# Patient Record
Sex: FEMALE | Race: WHITE | NOT HISPANIC OR LATINO | Employment: UNEMPLOYED | URBAN - METROPOLITAN AREA
[De-identification: names, ages, dates, MRNs, and addresses within clinical notes are randomized per-mention and may not be internally consistent; named-entity substitution may affect disease eponyms.]

---

## 2017-04-13 ENCOUNTER — HOSPITAL ENCOUNTER (OUTPATIENT)
Dept: RADIOLOGY | Facility: MEDICAL CENTER | Age: 37
Discharge: HOME | End: 2017-04-13
Payer: COMMERCIAL

## 2017-04-13 ENCOUNTER — ALLSCRIPTS OFFICE VISIT (OUTPATIENT)
Dept: OTHER | Facility: OTHER | Age: 37
End: 2017-04-13

## 2017-04-13 DIAGNOSIS — M79.644 PAIN OF FINGER OF RIGHT HAND: ICD-10-CM

## 2017-04-13 DIAGNOSIS — I34.1 NONRHEUMATIC MITRAL VALVE PROLAPSE: ICD-10-CM

## 2017-04-13 DIAGNOSIS — M79.645 PAIN OF FINGER OF LEFT HAND: ICD-10-CM

## 2017-04-13 PROCEDURE — 73110 X-RAY EXAM OF WRIST: CPT

## 2017-04-13 PROCEDURE — 73140 X-RAY EXAM OF FINGER(S): CPT

## 2017-04-17 ENCOUNTER — ALLSCRIPTS OFFICE VISIT (OUTPATIENT)
Dept: OTHER | Facility: OTHER | Age: 37
End: 2017-04-17

## 2017-04-17 ENCOUNTER — TRANSCRIBE ORDERS (OUTPATIENT)
Dept: ADMINISTRATIVE | Facility: HOSPITAL | Age: 37
End: 2017-04-17

## 2017-04-17 DIAGNOSIS — I34.1 MITRAL VALVE PROLAPSE: Primary | ICD-10-CM

## 2017-04-21 ENCOUNTER — HOSPITAL ENCOUNTER (OUTPATIENT)
Dept: NON INVASIVE DIAGNOSTICS | Facility: CLINIC | Age: 37
Discharge: HOME | End: 2017-04-21
Payer: COMMERCIAL

## 2017-04-21 DIAGNOSIS — I34.1 NONRHEUMATIC MITRAL VALVE PROLAPSE: ICD-10-CM

## 2017-04-21 PROCEDURE — 93225 XTRNL ECG REC<48 HRS REC: CPT

## 2017-04-21 PROCEDURE — 93226 XTRNL ECG REC<48 HR SCAN A/R: CPT

## 2017-06-15 ENCOUNTER — HOSPITAL ENCOUNTER (OUTPATIENT)
Dept: NON INVASIVE DIAGNOSTICS | Facility: MEDICAL CENTER | Age: 37
Discharge: HOME | End: 2017-06-15
Payer: COMMERCIAL

## 2017-06-15 DIAGNOSIS — I34.1 MITRAL VALVE PROLAPSE: ICD-10-CM

## 2017-06-15 PROCEDURE — 93306 TTE W/DOPPLER COMPLETE: CPT

## 2017-06-22 ENCOUNTER — ALLSCRIPTS OFFICE VISIT (OUTPATIENT)
Dept: OTHER | Facility: OTHER | Age: 37
End: 2017-06-22

## 2017-07-18 ENCOUNTER — ALLSCRIPTS OFFICE VISIT (OUTPATIENT)
Dept: OTHER | Facility: OTHER | Age: 37
End: 2017-07-18

## 2018-01-14 VITALS
WEIGHT: 198 LBS | HEIGHT: 65 IN | DIASTOLIC BLOOD PRESSURE: 79 MMHG | SYSTOLIC BLOOD PRESSURE: 149 MMHG | BODY MASS INDEX: 32.99 KG/M2 | HEART RATE: 73 BPM

## 2018-01-14 VITALS
HEIGHT: 65 IN | BODY MASS INDEX: 33.82 KG/M2 | DIASTOLIC BLOOD PRESSURE: 82 MMHG | SYSTOLIC BLOOD PRESSURE: 127 MMHG | WEIGHT: 203 LBS | HEART RATE: 61 BPM

## 2018-01-14 VITALS
WEIGHT: 202 LBS | HEIGHT: 65 IN | SYSTOLIC BLOOD PRESSURE: 114 MMHG | HEART RATE: 77 BPM | DIASTOLIC BLOOD PRESSURE: 74 MMHG | BODY MASS INDEX: 33.66 KG/M2

## 2018-01-14 VITALS
BODY MASS INDEX: 33.99 KG/M2 | SYSTOLIC BLOOD PRESSURE: 122 MMHG | WEIGHT: 204 LBS | DIASTOLIC BLOOD PRESSURE: 84 MMHG | HEART RATE: 60 BPM | HEIGHT: 65 IN

## 2019-07-17 ENCOUNTER — OFFICE VISIT (OUTPATIENT)
Dept: URGENT CARE | Facility: MEDICAL CENTER | Age: 39
End: 2019-07-17
Payer: COMMERCIAL

## 2019-07-17 VITALS — OXYGEN SATURATION: 99 % | RESPIRATION RATE: 18 BRPM | HEIGHT: 65 IN | BODY MASS INDEX: 38.25 KG/M2 | WEIGHT: 229.6 LBS

## 2019-07-17 DIAGNOSIS — S61.212A LACERATION OF RIGHT MIDDLE FINGER WITHOUT FOREIGN BODY WITHOUT DAMAGE TO NAIL, INITIAL ENCOUNTER: Primary | ICD-10-CM

## 2019-07-17 PROCEDURE — 12001 RPR S/N/AX/GEN/TRNK 2.5CM/<: CPT | Performed by: PHYSICIAN ASSISTANT

## 2019-07-17 PROCEDURE — 99213 OFFICE O/P EST LOW 20 MIN: CPT | Performed by: PHYSICIAN ASSISTANT

## 2019-07-17 RX ORDER — LEVOTHYROXINE SODIUM 0.03 MG/1
25 TABLET ORAL DAILY
COMMUNITY
End: 2020-10-01 | Stop reason: SDUPTHER

## 2019-07-17 RX ORDER — ALPRAZOLAM 0.5 MG/1
0.5 TABLET ORAL AS NEEDED
COMMUNITY
Start: 2017-05-22

## 2019-07-18 NOTE — PROGRESS NOTES
[facility]        NAME: Faby Caballero is a 45 y o  female  : 1980    MRN: [id number]  DATE: 2019  TIME: 10:25 PM    Assessment and Plan   Laceration of right middle finger without foreign body without damage to nail, initial encounter [S61 212A]  1  Laceration of right middle finger without foreign body without damage to nail, initial encounter           Patient Instructions     Keep area clean and dry  Do not apply antibiotic ointment over glue  Return for any signs of infection  Follow up with PCP in 3-5 days  Proceed to  ER if symptoms worsen  Chief Complaint     Chief Complaint   Patient presents with    Finger Laceration     Laceration to tip of third digit on pt's right hand  History of Present Illness       Patient is a 31-year-old female presenting for a laceration to the tip of her right middle finger and our goal   She reports she caught it on a broken metal broom  She has active bleeding that she was trying to stop  No swelling, bruising, numbness, tingling      Review of Systems   Review of Systems   Constitutional: Negative for chills and fever  Skin: Positive for wound  Neurological: Negative for numbness  Current Medications       Current Outpatient Medications:     ALPRAZolam (XANAX) 0 5 mg tablet, as needed, Disp: , Rfl:     levothyroxine 25 mcg tablet, Take 25 mcg by mouth daily, Disp: , Rfl:     Multiple Vitamins-Minerals (MULTIVITAMINS PLUS ZINC) CAPS, Take by mouth, Disp: , Rfl:     Current Allergies     Allergies as of 2019 - Reviewed 2019   Allergen Reaction Noted    Nickel Hives, Itching, and Rash 2015            The following portions of the patient's history were reviewed and updated as appropriate: allergies, current medications, past family history, past medical history, past social history, past surgical history and problem list      History reviewed  No pertinent past medical history  History reviewed   No pertinent surgical history  Family History   Problem Relation Age of Onset    Hypothyroidism Mother     Hypothyroidism Father     Heart disease Father          Medications have been verified  Objective   Resp 18   Ht 5' 5" (1 651 m)   Wt 104 kg (229 lb 9 6 oz)   LMP 06/20/2019 (Exact Date)   SpO2 99%   BMI 38 21 kg/m²          Physical Exam     Physical Exam   Constitutional: She is oriented to person, place, and time  She appears well-developed and well-nourished  No distress  HENT:   Head: Normocephalic and atraumatic  Eyes: Conjunctivae are normal    Pulmonary/Chest: Effort normal    Neurological: She is alert and oriented to person, place, and time  Skin: Skin is warm and dry  She is not diaphoretic    1cm clean well approximated laceration to finger pad of right middle finger  Bleeding was able to be stopped  Small amount of subcutaneous tissue seen  No swelling, bruising, numbness  Cap refill intact  Psychiatric: She has a normal mood and affect  Her behavior is normal      Laceration repair  Date/Time: 7/17/2019 10:27 PM  Performed by: hCeikh Bacon PA-C  Authorized by: Cheikh Bacon PA-C   Consent: Verbal consent obtained    Consent given by: patient  Body area: upper extremity  Location details: right long finger  Laceration length: 1 cm      Procedure Details:  Skin closure: glue  Patient tolerance: Patient tolerated the procedure well with no immediate complications

## 2019-09-18 ENCOUNTER — TELEPHONE (OUTPATIENT)
Dept: HEMATOLOGY ONCOLOGY | Facility: CLINIC | Age: 39
End: 2019-09-18

## 2019-12-03 ENCOUNTER — CONSULT (OUTPATIENT)
Dept: HEMATOLOGY ONCOLOGY | Facility: CLINIC | Age: 39
End: 2019-12-03
Payer: COMMERCIAL

## 2019-12-03 VITALS
TEMPERATURE: 97 F | OXYGEN SATURATION: 97 % | HEIGHT: 64 IN | RESPIRATION RATE: 16 BRPM | SYSTOLIC BLOOD PRESSURE: 140 MMHG | WEIGHT: 232 LBS | DIASTOLIC BLOOD PRESSURE: 92 MMHG | BODY MASS INDEX: 39.61 KG/M2 | HEART RATE: 104 BPM

## 2019-12-03 DIAGNOSIS — D68.52 PROTHROMBIN GENE MUTATION (HCC): Primary | ICD-10-CM

## 2019-12-03 PROCEDURE — 99245 OFF/OP CONSLTJ NEW/EST HI 55: CPT | Performed by: INTERNAL MEDICINE

## 2019-12-03 NOTE — PROGRESS NOTES
Oncology Outpatient Consult Note  Swetha Garner 44 y o  female MRN: @ Encounter: [id number]        Date:  12/3/2019        CC:  Heterozygote for prothrombin gene mutation      HPI:  Swetha Garner is seen for initial consultation 12/3/2019 regarding Heterozygosity for prothrombin gene mutation  The patient's mother has a history of blood clots so she was tested and her thrombosis panel was positive for her being a heterozygote for prothrombin gene mutation  The patient has had a pregnancy and healthy child with no issues  Has had multiple surgeries including a cardiac ablation and recent gallbladder surgery again with no issues  Denies any personal history of blood clots or thrombophlebitis  Denies any nausea denies any vomiting denies any diarrhea  Otherwise is at baseline  The rest of her 14 point review of systems today was negative  ROS: As stated in history of present illness otherwise her 14 point review of systems today was negative      Active Problems: Hypothyroidism, anxiety    Past Medical History: As above    Surgical History: cholecystectomy and cardiac ablation along with LEEP procedure and wisdom tooth removal    Family History:    Family History   Problem Relation Age of Onset   Junius Nicole Hypothyroidism Mother     Hypothyroidism Father     Heart disease Father          Social History:   Social History     Socioeconomic History    Marital status: Single     Spouse name: Not on file    Number of children: Not on file    Years of education: Not on file    Highest education level: Not on file   Occupational History    Not on file   Social Needs    Financial resource strain: Not on file    Food insecurity:     Worry: Not on file     Inability: Not on file    Transportation needs:     Medical: Not on file     Non-medical: Not on file   Tobacco Use    Smoking status: Never Smoker    Smokeless tobacco: Never Used   Substance and Sexual Activity    Alcohol use: Not on file    Drug use: Not on file  Sexual activity: Not on file   Lifestyle    Physical activity:     Days per week: Not on file     Minutes per session: Not on file    Stress: Not on file   Relationships    Social connections:     Talks on phone: Not on file     Gets together: Not on file     Attends Hindu service: Not on file     Active member of club or organization: Not on file     Attends meetings of clubs or organizations: Not on file     Relationship status: Not on file    Intimate partner violence:     Fear of current or ex partner: Not on file     Emotionally abused: Not on file     Physically abused: Not on file     Forced sexual activity: Not on file   Other Topics Concern    Not on file   Social History Narrative    Not on file       Current Medications:   Current Outpatient Medications   Medication Sig Dispense Refill    ALPRAZolam (XANAX) 0 5 mg tablet as needed      levothyroxine 25 mcg tablet Take 25 mcg by mouth daily      Multiple Vitamins-Minerals (MULTIVITAMINS PLUS ZINC) CAPS Take by mouth       No current facility-administered medications for this visit  Allergies: Allergies   Allergen Reactions    Nickel Hives, Itching and Rash         Physical Exam:    Body surface area is 2 08 meters squared  Wt Readings from Last 3 Encounters:   12/03/19 105 kg (232 lb)   07/17/19 104 kg (229 lb 9 6 oz)   07/18/17 91 6 kg (202 lb)        Temp Readings from Last 3 Encounters:   12/03/19 (!) 97 °F (36 1 °C) (Tympanic)        BP Readings from Last 3 Encounters:   12/03/19 140/92   07/18/17 114/74   06/22/17 127/82         Pulse Readings from Last 3 Encounters:   12/03/19 104   07/18/17 77   06/22/17 61    Physical Exam     Constitutional   General appearance: No acute distress, well appearing and well nourished  Eyes   Conjunctiva and lids: No swelling, erythema or discharge  Pupils and irises: Equal, round and reactive to light      Ears, Nose, Mouth, and Throat   External inspection of ears and nose: Normal  Nasal mucosa, septum, and turbinates: Normal without edema or erythema  Oropharynx: Normal with no erythema, edema, exudate or lesions  Pulmonary   Respiratory effort: No increased work of breathing or signs of respiratory distress  Auscultation of lungs: Clear to auscultation  Cardiovascular   Palpation of heart: Normal PMI, no thrills  Auscultation of heart: Normal rate and rhythm, normal S1 and S2, without murmurs  Examination of extremities for edema and/or varicosities: Normal     Carotid pulses: Normal     Abdomen   Abdomen: Non-tender, no masses  Liver and spleen: No hepatomegaly or splenomegaly  Lymphatic   Palpation of lymph nodes in neck: No lymphadenopathy  Musculoskeletal   Gait and station: Normal     Digits and nails: Normal without clubbing or cyanosis  Inspection/palpation of joints, bones, and muscles: Normal     Skin   Skin and subcutaneous tissue: Normal without rashes or lesions  Neurologic   Cranial nerves: Cranial nerves 2-12 intact  Sensation: No sensory loss  Psychiatric   Orientation to person, place, and time: Normal     Mood and affect: Normal           Assessment/ Plan:    The patient is a pleasant 79-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 for discussion about being heterozygote for prothrombin gene mutation  Her mother also carries a mutation and has had blood clots  The patient herself has no personal history of blood clots despite having been stressed multiple times including a pregnancy and surgeries  At this point I see no reason to start her on anticoagulation  I have recommended she take a baby aspirin daily  The patient is in agreement with the plan  She asked appropriate questions which were answered  She is aware of the signs and symptoms of both blood clot in her legs and also in the lungs as she is in healthcare herself   Regardless we went over all the signs and symptoms again and I advised her against long plane rides her car rides or long periods of immobility and advise she should always walk around after an hour or 2 of inactivity  The patient is in agreement with this  I'll see her as needed  She will continue follow with her primary care physician  She'll stay up-to-date on her screening  Goals and Barriers:  Current Goal:  Prolong Survival From heterozygosity for prothrombin gene mutation  Barriers: None  Patient's Capacity to Self Care:  Patient  able to self care  Portions of the record may have been created with voice recognition software   Occasional wrong word or "sound a like" substitutions may have occurred due to the inherent limitations of voice recognition software   Read the chart carefully and recognize, using context, where substitutions have occurred

## 2019-12-04 ENCOUNTER — TELEPHONE (OUTPATIENT)
Dept: HEMATOLOGY ONCOLOGY | Facility: CLINIC | Age: 39
End: 2019-12-04

## 2019-12-04 NOTE — TELEPHONE ENCOUNTER
Patient called in requested a excuse letter as she was the office yesterday  Patient requested the letter to be fax to [phone] [facility]

## 2020-01-28 ENCOUNTER — HOSPITAL ENCOUNTER (EMERGENCY)
Facility: HOSPITAL | Age: 40
Discharge: HOME | End: 2020-01-28
Attending: EMERGENCY MEDICINE
Payer: COMMERCIAL

## 2020-01-28 VITALS
RESPIRATION RATE: 20 BRPM | TEMPERATURE: 98.7 F | OXYGEN SATURATION: 94 % | SYSTOLIC BLOOD PRESSURE: 162 MMHG | HEART RATE: 65 BPM | DIASTOLIC BLOOD PRESSURE: 74 MMHG | BODY MASS INDEX: 39.48 KG/M2 | WEIGHT: 230 LBS

## 2020-01-28 DIAGNOSIS — R55 NEAR SYNCOPE: Primary | ICD-10-CM

## 2020-01-28 LAB
ALBUMIN SERPL BCP-MCNC: 4.1 G/DL (ref 3.5–5)
ALP SERPL-CCNC: 135 U/L (ref 46–116)
ALT SERPL W P-5'-P-CCNC: 33 U/L (ref 12–78)
ANION GAP SERPL CALCULATED.3IONS-SCNC: 9 MMOL/L (ref 4–13)
AST SERPL W P-5'-P-CCNC: 13 U/L (ref 5–45)
BASOPHILS # BLD AUTO: 0.06 THOUSANDS/ΜL (ref 0–0.1)
BASOPHILS NFR BLD AUTO: 0 % (ref 0–1)
BILIRUB SERPL-MCNC: 0.53 MG/DL (ref 0.2–1)
BUN SERPL-MCNC: 11 MG/DL (ref 5–25)
CALCIUM SERPL-MCNC: 9 MG/DL (ref 8.3–10.1)
CHLORIDE SERPL-SCNC: 101 MMOL/L (ref 100–108)
CO2 SERPL-SCNC: 29 MMOL/L (ref 21–32)
CREAT SERPL-MCNC: 0.73 MG/DL (ref 0.6–1.3)
EOSINOPHIL # BLD AUTO: 0.08 THOUSAND/ΜL (ref 0–0.61)
EOSINOPHIL NFR BLD AUTO: 1 % (ref 0–6)
ERYTHROCYTE [DISTWIDTH] IN BLOOD BY AUTOMATED COUNT: 12 % (ref 11.6–15.1)
GFR SERPL CREATININE-BSD FRML MDRD: 104 ML/MIN/1.73SQ M
GLUCOSE SERPL-MCNC: 108 MG/DL (ref 65–140)
HCT VFR BLD AUTO: 44.7 % (ref 34.8–46.1)
HGB BLD-MCNC: 14.9 G/DL (ref 11.5–15.4)
IMM GRANULOCYTES # BLD AUTO: 0.05 THOUSAND/UL (ref 0–0.2)
IMM GRANULOCYTES NFR BLD AUTO: 0 % (ref 0–2)
LYMPHOCYTES # BLD AUTO: 2.95 THOUSANDS/ΜL (ref 0.6–4.47)
LYMPHOCYTES NFR BLD AUTO: 22 % (ref 14–44)
MCH RBC QN AUTO: 30.3 PG (ref 26.8–34.3)
MCHC RBC AUTO-ENTMCNC: 33.3 G/DL (ref 31.4–37.4)
MCV RBC AUTO: 91 FL (ref 82–98)
MONOCYTES # BLD AUTO: 0.8 THOUSAND/ΜL (ref 0.17–1.22)
MONOCYTES NFR BLD AUTO: 6 % (ref 4–12)
NEUTROPHILS # BLD AUTO: 9.69 THOUSANDS/ΜL (ref 1.85–7.62)
NEUTS SEG NFR BLD AUTO: 71 % (ref 43–75)
NRBC BLD AUTO-RTO: 0 /100 WBCS
PLATELET # BLD AUTO: 257 THOUSANDS/UL (ref 149–390)
PMV BLD AUTO: 9.7 FL (ref 8.9–12.7)
POTASSIUM SERPL-SCNC: 4.1 MMOL/L (ref 3.5–5.3)
PROT SERPL-MCNC: 8.4 G/DL (ref 6.4–8.2)
RBC # BLD AUTO: 4.92 MILLION/UL (ref 3.81–5.12)
SODIUM SERPL-SCNC: 139 MMOL/L (ref 136–145)
TROPONIN I SERPL-MCNC: <0.02 NG/ML
TSH SERPL DL<=0.05 MIU/L-ACNC: 3.21 UIU/ML (ref 0.36–3.74)
WBC # BLD AUTO: 13.63 THOUSAND/UL (ref 4.31–10.16)

## 2020-01-28 PROCEDURE — 36415 COLL VENOUS BLD VENIPUNCTURE: CPT | Performed by: EMERGENCY MEDICINE

## 2020-01-28 PROCEDURE — 80053 COMPREHEN METABOLIC PANEL: CPT | Performed by: EMERGENCY MEDICINE

## 2020-01-28 PROCEDURE — 84443 ASSAY THYROID STIM HORMONE: CPT | Performed by: EMERGENCY MEDICINE

## 2020-01-28 PROCEDURE — 84484 ASSAY OF TROPONIN QUANT: CPT | Performed by: EMERGENCY MEDICINE

## 2020-01-28 PROCEDURE — 93005 ELECTROCARDIOGRAM TRACING: CPT

## 2020-01-28 PROCEDURE — 99283 EMERGENCY DEPT VISIT LOW MDM: CPT | Performed by: EMERGENCY MEDICINE

## 2020-01-28 PROCEDURE — 85025 COMPLETE CBC W/AUTO DIFF WBC: CPT | Performed by: EMERGENCY MEDICINE

## 2020-01-28 PROCEDURE — 96360 HYDRATION IV INFUSION INIT: CPT

## 2020-01-28 PROCEDURE — 99284 EMERGENCY DEPT VISIT MOD MDM: CPT

## 2020-01-28 RX ADMIN — SODIUM CHLORIDE 1000 ML: 0.9 INJECTION, SOLUTION INTRAVENOUS at 17:58

## 2020-01-28 NOTE — ED PROVIDER NOTES
History  Chief Complaint   Patient presents with    Dizziness     per pt "she had some palpitation at work and felt like she was going to pass out and has been having some dizzy spells since, pt has a Hx  of SVT  "     44 y o  Female presents with chief complaint of an episode of light headedness and near syncope while at work  Patient works as a school nurse  She reports she was up and down all day (normal for her) and had eaten as much as she usually does  About 1 hour prior to presentation she got up and felt very light headed and felt like her vision was going dark  She had some palpitations as well  She stood still and the symptoms improved but she had a recurrence of the symptoms a few minutes later  She sat down and drank some water and felt better  She drove herself to the ED and had some lightheadedness en route  No syncope  No chest pain  No shortness of breath  History provided by:  Patient   used: No    Dizziness   Quality:  Lightheadedness  Severity:  Moderate  Onset quality:  Sudden  Duration:  1 hour  Timing:  Intermittent  Progression:  Waxing and waning  Chronicity:  New  Relieved by:  Being still  Worsened by:  Nothing  Ineffective treatments:  None tried  Associated symptoms: palpitations and vision changes    Associated symptoms: no chest pain, no diarrhea, no headaches, no hearing loss, no nausea, no shortness of breath, no syncope, no vomiting and no weakness    Risk factors: new medications (lexapro)        Prior to Admission Medications   Prescriptions Last Dose Informant Patient Reported? Taking?    ALPRAZolam (XANAX) 0 5 mg tablet  Self Yes No   Sig: as needed   Multiple Vitamins-Minerals (MULTIVITAMINS PLUS ZINC) CAPS  Self Yes No   Sig: Take by mouth   levothyroxine 25 mcg tablet  Self Yes No   Sig: Take 25 mcg by mouth daily      Facility-Administered Medications: None       Past Medical History:   Diagnosis Date    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     Disease of thyroid gland     [facility]     SVT (supraventricular tachycardia) (HCC)        Past Surgical History:   Procedure Laterality Date    CARDIAC ELECTROPHYSIOLOGY STUDY AND ABLATION      CHOLECYSTECTOMY         Family History   Problem Relation Age of Onset    Hypothyroidism Mother     Hypothyroidism Father     Heart disease Father      I have reviewed and agree with the history as documented  Social History     Tobacco Use    Smoking status: Former Smoker     Years: 5 00    Smokeless tobacco: Never Used   Substance Use Topics    Alcohol use: Yes     Frequency: Never     Comment: rarely    Drug use: Never        Review of Systems   Constitutional: Negative for chills, diaphoresis and fever  HENT: Negative for hearing loss  Respiratory: Negative for shortness of breath  Cardiovascular: Positive for palpitations  Negative for chest pain and syncope  Gastrointestinal: Negative for diarrhea, nausea and vomiting  Genitourinary: Negative for dysuria and frequency  Skin: Negative for rash  Neurological: Positive for dizziness  Negative for weakness and headaches  All other systems reviewed and are negative  Physical Exam  Physical Exam   Constitutional: She is oriented to person, place, and time  She appears well-developed and well-nourished  No distress  HENT:   Head: Normocephalic and atraumatic  Eyes: Pupils are equal, round, and reactive to light  EOM are normal    Neck: Normal range of motion  No JVD present  Cardiovascular: Normal rate, regular rhythm, normal heart sounds and intact distal pulses  Exam reveals no gallop and no friction rub  No murmur heard  Pulmonary/Chest: Effort normal and breath sounds normal  No respiratory distress  She has no wheezes  She has no rales  She exhibits no tenderness  Musculoskeletal: Normal range of motion  She exhibits no tenderness  Neurological: She is alert and oriented to person, place, and time     Skin: Skin is warm and dry  Psychiatric: She has a normal mood and affect  Her behavior is normal  Judgment and thought content normal    Nursing note and vitals reviewed  Vital Signs  ED Triage Vitals [01/28/20 1641]   Temperature Pulse Respirations Blood Pressure SpO2   98 7 °F (37 1 °C) 65 20 162/74 94 %      Temp Source Heart Rate Source Patient Position - Orthostatic VS BP Location FiO2 (%)   Oral Monitor Sitting Left arm --      Pain Score       No Pain           Vitals:    01/28/20 1641   BP: 162/74   Pulse: 65   Patient Position - Orthostatic VS: Sitting         Visual Acuity      ED Medications  Medications   sodium chloride 0 9 % bolus 1,000 mL (0 mL Intravenous Stopped 1/28/20 1925)       Diagnostic Studies  Results Reviewed     Procedure Component Value Units Date/Time    TSH [[id number]]  (Normal) Collected:  01/28/20 1757    Lab Status:  Final result Specimen:  Blood from Arm, Left Updated:  01/28/20 1829     TSH 3RD GENERATON 3 213 uIU/mL     Narrative:       Patients undergoing fluorescein dye angiography may retain small amounts of fluorescein in the body for 48-72 hours post procedure  Samples containing fluorescein can produce falsely depressed TSH values  If the patient had this procedure,a specimen should be resubmitted post fluorescein clearance        Troponin I [[id number]]  (Normal) Collected:  01/28/20 1757    Lab Status:  Final result Specimen:  Blood from Arm, Left Updated:  01/28/20 1822     Troponin I <0 02 ng/mL     Comprehensive metabolic panel [[id number]]  (Abnormal) Collected:  01/28/20 1757    Lab Status:  Final result Specimen:  Blood from Arm, Left Updated:  01/28/20 1821     Sodium 139 mmol/L      Potassium 4 1 mmol/L      Chloride 101 mmol/L      CO2 29 mmol/L      ANION GAP 9 mmol/L      BUN 11 mg/dL      Creatinine 0 73 mg/dL      Glucose 108 mg/dL      Calcium 9 0 mg/dL      AST 13 U/L      ALT 33 U/L      Alkaline Phosphatase 135 U/L      Total Protein 8 4 g/dL      Albumin 4 1 g/dL      Total Bilirubin 0 53 mg/dL      eGFR 104 ml/min/1 73sq m     Narrative:       [place] guidelines for Chronic Kidney Disease (CKD):     Stage 1 with normal or high GFR (GFR > 90 mL/min/1 73 square meters)    Stage 2 Mild CKD (GFR = 60-89 mL/min/1 73 square meters)    Stage 3A Moderate CKD (GFR = 45-59 mL/min/1 73 square meters)    Stage 3B Moderate CKD (GFR = 30-44 mL/min/1 73 square meters)    Stage 4 Severe CKD (GFR = 15-29 mL/min/1 73 square meters)    Stage 5 End Stage CKD (GFR <15 mL/min/1 73 square meters)  Note: GFR calculation is accurate only with a steady state creatinine    CBC and differential [[id number]]  (Abnormal) Collected:  01/28/20 1757    Lab Status:  Final result Specimen:  Blood from Arm, Left Updated:  01/28/20 1805     WBC 13 63 Thousand/uL      RBC 4 92 Million/uL      Hemoglobin 14 9 g/dL      Hematocrit 44 7 %      MCV 91 fL      MCH 30 3 pg      MCHC 33 3 g/dL      RDW 12 0 %      MPV 9 7 fL      Platelets 857 Thousands/uL      nRBC 0 /100 WBCs      Neutrophils Relative 71 %      Immat GRANS % 0 %      Lymphocytes Relative 22 %      Monocytes Relative 6 %      Eosinophils Relative 1 %      Basophils Relative 0 %      Neutrophils Absolute 9 69 Thousands/µL      Immature Grans Absolute 0 05 Thousand/uL      Lymphocytes Absolute 2 95 Thousands/µL      Monocytes Absolute 0 80 Thousand/µL      Eosinophils Absolute 0 08 Thousand/µL      Basophils Absolute 0 06 Thousands/µL                  No orders to display              Procedures  ECG 12 Lead Documentation Only  Date/Time: 1/28/2020 5:56 PM  Performed by: Skylar Rodríguez MD  Authorized by: Skylar Rodríguez MD     Indications / Diagnosis:  Palpitations, dizziness   ECG reviewed by me, the ED Provider: yes    Patient location:  ED  Previous ECG:     Previous ECG:  Unavailable  Interpretation:     Interpretation: normal    Rate:     ECG rate:  56    ECG rate assessment: bradycardic    Rhythm:     Rhythm: sinus bradycardia    Ectopy:     Ectopy: none    QRS:     QRS axis:  Normal    QRS intervals:  Normal  Conduction:     Conduction: normal    ST segments:     ST segments:  Normal  T waves:     T waves: normal               ED Course                               MDM  Number of Diagnoses or Management Options  Near syncope: new and requires workup  Diagnosis management comments: Background: 44 y o  female presents with chief complaint of lightheadedness and dizziness  Differential Diagnosis: arrhythmia, atypical acs, anemia, metabolic derangement, concussion, vertigo, dehydration  Plan: cardiac workup, symptomatic treatment, possible admission         Amount and/or Complexity of Data Reviewed  Clinical lab tests: ordered and reviewed    Risk of Complications, Morbidity, and/or Mortality  Presenting problems: high  Diagnostic procedures: high  Management options: high  General comments: Patient ambulated in the department without difficulty or complaint  She is comfortable with discharge and I gave her return precautions for chest pain, palpitations, syncope or any other symptom that gives her concern  Patient Progress  Patient progress: stable        Disposition  Final diagnoses:   Near syncope     Time reflects when diagnosis was documented in both MDM as applicable and the Disposition within this note     Time User Action Codes Description Comment    1/28/2020  7:57 PM Migel Vazquez Add [R55] Near syncope       ED Disposition     ED Disposition Condition Date/Time Comment    Discharge Stable Tue Jan 28, 2020  7:57 PM Erica Colón discharge to home/self care              Follow-up Information     Follow up With Specialties Details [address]  Nurse Practitioner Schedule an appointment as soon as possible for a visit in 1 week  [address] [facility] Male [address] [place] [id number]  [phone]            Discharge Medication List as of 1/28/2020  7:57 PM      CONTINUE these medications which have NOT CHANGED    Details   ALPRAZolam (XANAX) 0 5 mg tablet as needed, Historical Med      levothyroxine 25 mcg tablet Take 25 mcg by mouth daily, Historical Med      Multiple Vitamins-Minerals (MULTIVITAMINS PLUS ZINC) CAPS Take by mouth, Historical Med           No discharge procedures on file      ED Provider  Electronically Signed by           Aruna Ledesma MD  01/29/20 1927

## 2020-01-30 LAB
ATRIAL RATE: 59 BPM
P AXIS: 26 DEGREES
PR INTERVAL: 142 MS
QRS AXIS: 38 DEGREES
QRSD INTERVAL: 98 MS
QT INTERVAL: 426 MS
QTC INTERVAL: 412 MS
T WAVE AXIS: 46 DEGREES
VENTRICULAR RATE: 56 BPM

## 2020-01-30 PROCEDURE — 93010 ELECTROCARDIOGRAM REPORT: CPT | Performed by: INTERNAL MEDICINE

## 2020-10-01 ENCOUNTER — OFFICE VISIT (OUTPATIENT)
Dept: FAMILY MEDICINE CLINIC | Facility: CLINIC | Age: 40
End: 2020-10-01
Payer: COMMERCIAL

## 2020-10-01 VITALS
WEIGHT: 233 LBS | HEART RATE: 72 BPM | BODY MASS INDEX: 38.82 KG/M2 | RESPIRATION RATE: 18 BRPM | OXYGEN SATURATION: 98 % | SYSTOLIC BLOOD PRESSURE: 130 MMHG | HEIGHT: 65 IN | TEMPERATURE: 96.5 F | DIASTOLIC BLOOD PRESSURE: 80 MMHG

## 2020-10-01 DIAGNOSIS — R19.7 DIARRHEA, UNSPECIFIED TYPE: ICD-10-CM

## 2020-10-01 DIAGNOSIS — Z20.9 EXPOSURE TO POTENTIAL INFECTION: ICD-10-CM

## 2020-10-01 DIAGNOSIS — F32.0 CURRENT MILD EPISODE OF MAJOR DEPRESSIVE DISORDER, UNSPECIFIED WHETHER RECURRENT (HCC): ICD-10-CM

## 2020-10-01 DIAGNOSIS — E66.01 CLASS 3 SEVERE OBESITY DUE TO EXCESS CALORIES WITH SERIOUS COMORBIDITY IN ADULT, UNSPECIFIED BMI (HCC): ICD-10-CM

## 2020-10-01 DIAGNOSIS — Z00.00 ANNUAL PHYSICAL EXAM: Primary | ICD-10-CM

## 2020-10-01 DIAGNOSIS — Z11.1 SCREENING FOR TUBERCULOSIS: ICD-10-CM

## 2020-10-01 PROCEDURE — 99396 PREV VISIT EST AGE 40-64: CPT | Performed by: NURSE PRACTITIONER

## 2020-10-01 RX ORDER — ESCITALOPRAM OXALATE 10 MG/1
10 TABLET ORAL DAILY
Qty: 90 TABLET | Refills: 1 | Status: SHIPPED | OUTPATIENT
Start: 2020-10-01 | End: 2021-03-26

## 2020-10-01 RX ORDER — CHOLESTYRAMINE 4 G/9G
1 POWDER, FOR SUSPENSION ORAL 2 TIMES DAILY WITH MEALS
Qty: 60 PACKET | Refills: 3 | Status: SHIPPED | OUTPATIENT
Start: 2020-10-01 | End: 2021-12-08

## 2020-10-01 RX ORDER — ESCITALOPRAM OXALATE 5 MG/1
5 TABLET ORAL DAILY
COMMUNITY
Start: 2020-09-16 | End: 2020-10-01 | Stop reason: SDUPTHER

## 2020-10-01 RX ORDER — ERGOCALCIFEROL 1.25 MG/1
50000 CAPSULE ORAL WEEKLY
COMMUNITY
Start: 2020-09-30

## 2020-10-01 RX ORDER — ADALIMUMAB 40MG/0.4ML
KIT SUBCUTANEOUS
COMMUNITY
Start: 2020-08-13 | End: 2021-12-08

## 2020-10-01 RX ORDER — LEVOTHYROXINE SODIUM 0.03 MG/1
25 TABLET ORAL DAILY
Qty: 90 TABLET | Refills: 1 | Status: SHIPPED | OUTPATIENT
Start: 2020-10-01

## 2020-11-18 RX ORDER — CELECOXIB 200 MG/1
200 CAPSULE ORAL 2 TIMES DAILY
COMMUNITY
Start: 2020-10-15 | End: 2021-12-08

## 2020-11-18 RX ORDER — ADALIMUMAB 40MG/0.8ML
40 KIT SUBCUTANEOUS
COMMUNITY
Start: 2020-10-15 | End: 2021-12-08

## 2021-03-10 DIAGNOSIS — Z23 ENCOUNTER FOR IMMUNIZATION: ICD-10-CM

## 2021-03-26 DIAGNOSIS — Z00.00 ANNUAL PHYSICAL EXAM: ICD-10-CM

## 2021-03-26 DIAGNOSIS — Z20.9 EXPOSURE TO POTENTIAL INFECTION: ICD-10-CM

## 2021-03-26 DIAGNOSIS — F32.0 CURRENT MILD EPISODE OF MAJOR DEPRESSIVE DISORDER, UNSPECIFIED WHETHER RECURRENT (HCC): ICD-10-CM

## 2021-03-26 DIAGNOSIS — E66.01 CLASS 3 SEVERE OBESITY DUE TO EXCESS CALORIES WITH SERIOUS COMORBIDITY IN ADULT, UNSPECIFIED BMI (HCC): ICD-10-CM

## 2021-03-26 RX ORDER — ESCITALOPRAM OXALATE 10 MG/1
TABLET ORAL
Qty: 30 TABLET | Refills: 5 | Status: SHIPPED | OUTPATIENT
Start: 2021-03-26 | End: 2021-12-08

## 2021-04-19 LAB — HCV AB SER-ACNC: NEGATIVE

## 2021-09-30 ENCOUNTER — NURSE TRIAGE (OUTPATIENT)
Dept: PHYSICAL THERAPY | Facility: OTHER | Age: 41
End: 2021-09-30

## 2021-09-30 DIAGNOSIS — M54.9 CHRONIC NECK AND BACK PAIN: Primary | ICD-10-CM

## 2021-09-30 DIAGNOSIS — M54.2 CHRONIC NECK AND BACK PAIN: Primary | ICD-10-CM

## 2021-09-30 DIAGNOSIS — G89.29 CHRONIC NECK AND BACK PAIN: Primary | ICD-10-CM

## 2021-09-30 NOTE — TELEPHONE ENCOUNTER
Additional Information   Negative: Is this related to a work injury?  Negative: Is this related to an MVA?  Negative: Are you currently recieving homecare services?  Negative: Has the patient had unexplained weight loss?  Negative: Does the patient have a fever?  Negative: Is the patient experiencing blood in sputum?  Negative: Is the patient experiencing urine retention?  Negative: Is the patient experiencing acute drop foot or paralysis?  Negative: Has the patient experienced major trauma? (fall from height, high speed collision, direct blow to spine) and is also experiencing nausea, light-headedness, or loss of consciousness?  Affirmative: Is this a chronic condition? Background - Initial Assessment  Clinical complaint: Chronic bilateral lower back pain L > R  States she does get some numbness in her left leg at times  States back pain present for 1- 2 yrs  Chronic left side neck pain which radiates to the left shoulder and upper back  States some tingling in the left arm at times  States present for 5-10 yrs  Date of onset: back 1-2 yrs and neck 5-10 yrs  Frequency of pain: constant  Quality of pain: aching and dull "tightness"    Protocols used: [facility] COMPREHENSIVE SPINE PROGRAM PROTOCOL    This RN did review in detail the Comprehensive Spine Program and what we can provide for their back and neck pain  Patient is agreeable to being triaged by this RN and would like to proceed with Physical Therapy  Referral was placed for Physical Therapy at the [facility]  Patients information was sent to the  to make evaluation appointment  Patient made aware that the PT office  will be calling to schedule the appointment  Patient was provided with the phone number to the PT office  No further questions and/or concerns were voiced by the patient at this time  Patient states understanding of the referral that was placed  Preceptor Attestation:   Patient seen, evaluated and discussed with the resident. I have verified the content of the note, which accurately reflects my assessment of the patient and the plan of care.   Supervising Physician:  David Marcum MD

## 2021-12-08 ENCOUNTER — ANESTHESIA EVENT (OUTPATIENT)
Dept: PERIOP | Facility: HOSPITAL | Age: 41
End: 2021-12-08
Payer: COMMERCIAL

## 2021-12-08 RX ORDER — ESCITALOPRAM OXALATE 20 MG/1
20 TABLET ORAL
COMMUNITY

## 2021-12-08 RX ORDER — PREGABALIN 75 MG/1
75 CAPSULE ORAL 3 TIMES DAILY
COMMUNITY

## 2021-12-10 ENCOUNTER — HOSPITAL ENCOUNTER (OUTPATIENT)
Facility: HOSPITAL | Age: 41
Setting detail: OUTPATIENT SURGERY
Discharge: HOME | End: 2021-12-10
Attending: OTOLARYNGOLOGY | Admitting: OTOLARYNGOLOGY
Payer: COMMERCIAL

## 2021-12-10 ENCOUNTER — ANESTHESIA (OUTPATIENT)
Dept: PERIOP | Facility: HOSPITAL | Age: 41
End: 2021-12-10
Payer: COMMERCIAL

## 2021-12-10 VITALS
DIASTOLIC BLOOD PRESSURE: 64 MMHG | HEIGHT: 65 IN | BODY MASS INDEX: 38.82 KG/M2 | OXYGEN SATURATION: 95 % | HEART RATE: 87 BPM | WEIGHT: 233 LBS | RESPIRATION RATE: 18 BRPM | SYSTOLIC BLOOD PRESSURE: 125 MMHG | TEMPERATURE: 98.1 F

## 2021-12-10 DIAGNOSIS — J35.8 TONSIL STONE: ICD-10-CM

## 2021-12-10 DIAGNOSIS — Z90.89 S/P TONSILLECTOMY: Primary | ICD-10-CM

## 2021-12-10 LAB
EXT PREGNANCY TEST URINE: NEGATIVE
EXT. CONTROL: NORMAL

## 2021-12-10 PROCEDURE — 88304 TISSUE EXAM BY PATHOLOGIST: CPT | Performed by: PATHOLOGY

## 2021-12-10 PROCEDURE — 42821 REMOVE TONSILS AND ADENOIDS: CPT | Performed by: OTOLARYNGOLOGY

## 2021-12-10 PROCEDURE — 81025 URINE PREGNANCY TEST: CPT | Performed by: OTOLARYNGOLOGY

## 2021-12-10 RX ORDER — ONDANSETRON 2 MG/ML
4 INJECTION INTRAMUSCULAR; INTRAVENOUS ONCE AS NEEDED
Status: DISCONTINUED | OUTPATIENT
Start: 2021-12-10 | End: 2021-12-10 | Stop reason: HOSPADM

## 2021-12-10 RX ORDER — OXYCODONE HCL 5 MG/5 ML
10 SOLUTION, ORAL ORAL EVERY 4 HOURS PRN
Status: DISCONTINUED | OUTPATIENT
Start: 2021-12-10 | End: 2021-12-10 | Stop reason: HOSPADM

## 2021-12-10 RX ORDER — OXYMETAZOLINE HYDROCHLORIDE 0.05 G/100ML
SPRAY NASAL AS NEEDED
Status: DISCONTINUED | OUTPATIENT
Start: 2021-12-10 | End: 2021-12-10 | Stop reason: HOSPADM

## 2021-12-10 RX ORDER — LIDOCAINE HYDROCHLORIDE 10 MG/ML
0.5 INJECTION, SOLUTION EPIDURAL; INFILTRATION; INTRACAUDAL; PERINEURAL ONCE AS NEEDED
Status: DISCONTINUED | OUTPATIENT
Start: 2021-12-10 | End: 2021-12-10 | Stop reason: HOSPADM

## 2021-12-10 RX ORDER — LIDOCAINE HYDROCHLORIDE 10 MG/ML
INJECTION, SOLUTION EPIDURAL; INFILTRATION; INTRACAUDAL; PERINEURAL AS NEEDED
Status: DISCONTINUED | OUTPATIENT
Start: 2021-12-10 | End: 2021-12-10

## 2021-12-10 RX ORDER — SODIUM CHLORIDE, SODIUM LACTATE, POTASSIUM CHLORIDE, CALCIUM CHLORIDE 600; 310; 30; 20 MG/100ML; MG/100ML; MG/100ML; MG/100ML
INJECTION, SOLUTION INTRAVENOUS CONTINUOUS PRN
Status: DISCONTINUED | OUTPATIENT
Start: 2021-12-10 | End: 2021-12-10

## 2021-12-10 RX ORDER — MEPERIDINE HYDROCHLORIDE 25 MG/ML
12.5 INJECTION INTRAMUSCULAR; INTRAVENOUS; SUBCUTANEOUS ONCE
Status: DISCONTINUED | OUTPATIENT
Start: 2021-12-10 | End: 2021-12-10 | Stop reason: HOSPADM

## 2021-12-10 RX ORDER — DEXAMETHASONE SODIUM PHOSPHATE 4 MG/ML
INJECTION, SOLUTION INTRA-ARTICULAR; INTRALESIONAL; INTRAMUSCULAR; INTRAVENOUS; SOFT TISSUE AS NEEDED
Status: DISCONTINUED | OUTPATIENT
Start: 2021-12-10 | End: 2021-12-10

## 2021-12-10 RX ORDER — LABETALOL 20 MG/4 ML (5 MG/ML) INTRAVENOUS SYRINGE
5
Status: DISCONTINUED | OUTPATIENT
Start: 2021-12-10 | End: 2021-12-10 | Stop reason: HOSPADM

## 2021-12-10 RX ORDER — OXYCODONE HYDROCHLORIDE AND ACETAMINOPHEN 5; 325 MG/1; MG/1
2 TABLET ORAL EVERY 4 HOURS PRN
Qty: 40 TABLET | Refills: 0 | Status: SHIPPED | OUTPATIENT
Start: 2021-12-10 | End: 2021-12-16

## 2021-12-10 RX ORDER — ACETAMINOPHEN 160 MG/5ML
650 SUSPENSION, ORAL (FINAL DOSE FORM) ORAL EVERY 4 HOURS PRN
Status: DISCONTINUED | OUTPATIENT
Start: 2021-12-10 | End: 2021-12-10 | Stop reason: HOSPADM

## 2021-12-10 RX ORDER — FENTANYL CITRATE 50 UG/ML
INJECTION, SOLUTION INTRAMUSCULAR; INTRAVENOUS AS NEEDED
Status: DISCONTINUED | OUTPATIENT
Start: 2021-12-10 | End: 2021-12-10

## 2021-12-10 RX ORDER — ALBUTEROL SULFATE 2.5 MG/3ML
2.5 SOLUTION RESPIRATORY (INHALATION) ONCE AS NEEDED
Status: DISCONTINUED | OUTPATIENT
Start: 2021-12-10 | End: 2021-12-10 | Stop reason: HOSPADM

## 2021-12-10 RX ORDER — ONDANSETRON 2 MG/ML
INJECTION INTRAMUSCULAR; INTRAVENOUS AS NEEDED
Status: DISCONTINUED | OUTPATIENT
Start: 2021-12-10 | End: 2021-12-10

## 2021-12-10 RX ORDER — ROCURONIUM BROMIDE 10 MG/ML
INJECTION, SOLUTION INTRAVENOUS AS NEEDED
Status: DISCONTINUED | OUTPATIENT
Start: 2021-12-10 | End: 2021-12-10

## 2021-12-10 RX ORDER — OXYCODONE HYDROCHLORIDE AND ACETAMINOPHEN 5; 325 MG/1; MG/1
2 TABLET ORAL EVERY 4 HOURS PRN
Qty: 40 TABLET | Refills: 0 | Status: SHIPPED | OUTPATIENT
Start: 2021-12-10 | End: 2021-12-10 | Stop reason: SDUPTHER

## 2021-12-10 RX ORDER — METOCLOPRAMIDE HYDROCHLORIDE 5 MG/ML
INJECTION INTRAMUSCULAR; INTRAVENOUS AS NEEDED
Status: DISCONTINUED | OUTPATIENT
Start: 2021-12-10 | End: 2021-12-10

## 2021-12-10 RX ORDER — HYDROMORPHONE HCL/PF 1 MG/ML
0.5 SYRINGE (ML) INJECTION
Status: DISCONTINUED | OUTPATIENT
Start: 2021-12-10 | End: 2021-12-10 | Stop reason: HOSPADM

## 2021-12-10 RX ORDER — PROMETHAZINE HYDROCHLORIDE 25 MG/ML
12.5 INJECTION, SOLUTION INTRAMUSCULAR; INTRAVENOUS ONCE AS NEEDED
Status: DISCONTINUED | OUTPATIENT
Start: 2021-12-10 | End: 2021-12-10 | Stop reason: HOSPADM

## 2021-12-10 RX ORDER — SODIUM CHLORIDE, SODIUM LACTATE, POTASSIUM CHLORIDE, CALCIUM CHLORIDE 600; 310; 30; 20 MG/100ML; MG/100ML; MG/100ML; MG/100ML
125 INJECTION, SOLUTION INTRAVENOUS CONTINUOUS
Status: DISCONTINUED | OUTPATIENT
Start: 2021-12-10 | End: 2021-12-10 | Stop reason: HOSPADM

## 2021-12-10 RX ORDER — SCOLOPAMINE TRANSDERMAL SYSTEM 1 MG/1
1 PATCH, EXTENDED RELEASE TRANSDERMAL
Status: DISCONTINUED | OUTPATIENT
Start: 2021-12-10 | End: 2021-12-10 | Stop reason: HOSPADM

## 2021-12-10 RX ORDER — MIDAZOLAM HYDROCHLORIDE 2 MG/2ML
INJECTION, SOLUTION INTRAMUSCULAR; INTRAVENOUS AS NEEDED
Status: DISCONTINUED | OUTPATIENT
Start: 2021-12-10 | End: 2021-12-10

## 2021-12-10 RX ORDER — PROPOFOL 10 MG/ML
INJECTION, EMULSION INTRAVENOUS AS NEEDED
Status: DISCONTINUED | OUTPATIENT
Start: 2021-12-10 | End: 2021-12-10

## 2021-12-10 RX ORDER — DIPHENHYDRAMINE HYDROCHLORIDE 50 MG/ML
INJECTION INTRAMUSCULAR; INTRAVENOUS AS NEEDED
Status: DISCONTINUED | OUTPATIENT
Start: 2021-12-10 | End: 2021-12-10

## 2021-12-10 RX ORDER — FENTANYL CITRATE/PF 50 MCG/ML
25 SYRINGE (ML) INJECTION
Status: DISCONTINUED | OUTPATIENT
Start: 2021-12-10 | End: 2021-12-10 | Stop reason: HOSPADM

## 2021-12-10 RX ADMIN — ROCURONIUM BROMIDE 50 MG: 10 SOLUTION INTRAVENOUS at 12:32

## 2021-12-10 RX ADMIN — LIDOCAINE HYDROCHLORIDE 50 MG: 10 INJECTION, SOLUTION EPIDURAL; INFILTRATION; INTRACAUDAL at 12:32

## 2021-12-10 RX ADMIN — SUGAMMADEX 200 MG: 100 INJECTION, SOLUTION INTRAVENOUS at 13:44

## 2021-12-10 RX ADMIN — FENTANYL CITRATE 100 MCG: 50 INJECTION, SOLUTION INTRAMUSCULAR; INTRAVENOUS at 12:32

## 2021-12-10 RX ADMIN — DIPHENHYDRAMINE HYDROCHLORIDE 25 MG: 50 INJECTION, SOLUTION INTRAMUSCULAR; INTRAVENOUS at 12:41

## 2021-12-10 RX ADMIN — FENTANYL CITRATE 25 MCG: 50 INJECTION INTRAMUSCULAR; INTRAVENOUS at 14:19

## 2021-12-10 RX ADMIN — SCOPALAMINE 1 PATCH: 1 PATCH, EXTENDED RELEASE TRANSDERMAL at 12:11

## 2021-12-10 RX ADMIN — ONDANSETRON 4 MG: 2 INJECTION INTRAMUSCULAR; INTRAVENOUS at 13:03

## 2021-12-10 RX ADMIN — SODIUM CHLORIDE, SODIUM LACTATE, POTASSIUM CHLORIDE, AND CALCIUM CHLORIDE: .6; .31; .03; .02 INJECTION, SOLUTION INTRAVENOUS at 12:28

## 2021-12-10 RX ADMIN — PROPOFOL 200 MG: 10 INJECTION, EMULSION INTRAVENOUS at 12:32

## 2021-12-10 RX ADMIN — ONDANSETRON 4 MG: 2 INJECTION INTRAMUSCULAR; INTRAVENOUS at 13:44

## 2021-12-10 RX ADMIN — DEXAMETHASONE SODIUM PHOSPHATE 12 MG: 4 INJECTION INTRA-ARTICULAR; INTRALESIONAL; INTRAMUSCULAR; INTRAVENOUS; SOFT TISSUE at 12:36

## 2021-12-10 RX ADMIN — MIDAZOLAM HYDROCHLORIDE 2 MG: 1 INJECTION, SOLUTION INTRAMUSCULAR; INTRAVENOUS at 12:27

## 2021-12-10 RX ADMIN — METOCLOPRAMIDE HYDROCHLORIDE 10 MG: 5 INJECTION INTRAMUSCULAR; INTRAVENOUS at 12:43

## 2021-12-10 RX ADMIN — ACETAMINOPHEN 650 MG: 650 SUSPENSION ORAL at 15:43

## 2021-12-10 RX ADMIN — OXYCODONE HYDROCHLORIDE 10 MG: 5 SOLUTION ORAL at 15:01

## 2022-02-17 ENCOUNTER — OFFICE VISIT (OUTPATIENT)
Dept: FAMILY MEDICINE CLINIC | Facility: CLINIC | Age: 42
End: 2022-02-17
Payer: COMMERCIAL

## 2022-02-17 ENCOUNTER — TELEPHONE (OUTPATIENT)
Dept: ADMINISTRATIVE | Facility: OTHER | Age: 42
End: 2022-02-17

## 2022-02-17 VITALS
HEART RATE: 64 BPM | TEMPERATURE: 97.9 F | WEIGHT: 224 LBS | DIASTOLIC BLOOD PRESSURE: 90 MMHG | SYSTOLIC BLOOD PRESSURE: 136 MMHG | HEIGHT: 65 IN | BODY MASS INDEX: 37.32 KG/M2 | OXYGEN SATURATION: 98 %

## 2022-02-17 DIAGNOSIS — L40.50 PSORIATIC ARTHRITIS (HCC): ICD-10-CM

## 2022-02-17 DIAGNOSIS — L40.9 PSORIASIS: ICD-10-CM

## 2022-02-17 DIAGNOSIS — E06.3 HYPOTHYROIDISM DUE TO HASHIMOTO'S THYROIDITIS: ICD-10-CM

## 2022-02-17 DIAGNOSIS — E03.8 HYPOTHYROIDISM DUE TO HASHIMOTO'S THYROIDITIS: ICD-10-CM

## 2022-02-17 DIAGNOSIS — F41.9 ANXIETY: ICD-10-CM

## 2022-02-17 DIAGNOSIS — G47.9 SLEEP DISTURBANCE: ICD-10-CM

## 2022-02-17 DIAGNOSIS — Z76.89 ENCOUNTER TO ESTABLISH CARE: Primary | ICD-10-CM

## 2022-02-17 PROBLEM — E03.9 HYPOTHYROID: Status: ACTIVE | Noted: 2019-12-08

## 2022-02-17 PROBLEM — M79.7 FIBROMYALGIA AFFECTING MULTIPLE SITES: Status: ACTIVE | Noted: 2021-10-07

## 2022-02-17 PROCEDURE — 99214 OFFICE O/P EST MOD 30 MIN: CPT | Performed by: FAMILY MEDICINE

## 2022-02-17 RX ORDER — SECUKINUMAB 150 MG/ML
INJECTION SUBCUTANEOUS
COMMUNITY
Start: 2022-01-17 | End: 2022-02-17

## 2022-02-17 RX ORDER — TRAMADOL HYDROCHLORIDE 50 MG/1
50 TABLET ORAL EVERY 12 HOURS PRN
COMMUNITY
Start: 2022-01-17 | End: 2023-02-03

## 2022-02-17 NOTE — TELEPHONE ENCOUNTER
----- Message from Jw Rodas MA sent at 2/17/2022  9:37 AM EST -----  Regarding: Multiple Care Gaps  02/17/22 9:37 AM    Hello, our patient Sina Leon has had Hepatitis C, Mammogram, and Pap Smear (HPV) aka Cervical Cancer Screening completed/performed  Please assist in updating the patient chart by pulling the Care Everywhere (CE) document  The date of service is 2021       Thank you,  Jw Rodas MA   ROSSY GRADY

## 2022-02-17 NOTE — ASSESSMENT & PLAN NOTE
Patient has interrupted sleep and continued fatigue    Previous sleep study was normal   Recommended no naps, some exercise, fresh air, and changing Lexapro to nighttime

## 2022-02-17 NOTE — PROGRESS NOTES
Assessment/Plan:         Problem List Items Addressed This Visit        Endocrine    Hypothyroid     Controlled on levothyroxine 25mcg  Labs reviewed  Musculoskeletal and Integument    Psoriasis    Psoriatic arthritis ([facility] [address] )     Follows with rheumatology  Takes Cosentyx            Other    Encounter to establish care - Primary    Anxiety     Takes Lexapro daily and alprazolam as needed  Controlled at present  Sleep disturbance     Patient has interrupted sleep and continued fatigue  Previous sleep study was normal   Recommended no naps, some exercise, fresh air, and changing Lexapro to nighttime                 Subjective:      Patient ID: Delroy Brown is a 39 y o  female  Patient presents to the office to establish care  She also wants to discuss some ongoing fatigue  She has some difficulty sleeping for awhile  She had a sleep study done this past year which was normal  She said it showed mild sleep apnea but the doctor told her she did not need CPAP  She had her tonsils out which did not help her fatigue  She has some interrupted sleep and constant fatigue  She denies any depression symptoms  She has a h/o fibromyalgia and psoriatic arthritis - sees rheumatology through [facility]  Is on Tramadol, Lyrica and Cosentyxx    Anxiety - is on Lexapro and Xanax -will be getting her Rx's through our office now        The following portions of the patient's history were reviewed and updated as appropriate:   Past Medical History:  She has a past medical history of Anxiety, 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[address] ), Disease of thyroid gland, Ear problems, Fatty liver, GERD (gastroesophageal reflux disease), Hashimoto's disease, Heart murmur, Mitral valve prolapse, Obesity (BMI 30-39 9), Prothrombin gene mutation ([facility] [address] ), Psoriatic arthritis ([facility] [address] ), Sleep difficulties, SVT (supraventricular tachycardia) ([address] ), and Varicosities of leg ,  _______________________________________________________________________  Medical Problems:  does not have any pertinent problems on file ,  _______________________________________________________________________  Past Surgical History:   has a past surgical history that includes Cholecystectomy; Cardiac electrophysiology study and ablation; Cervical biopsy w/ loop electrode excision (2004); [place] tooth extraction; Colonoscopy; and pr remove tonsils/adenoids,12+ y/o (N/A, 12/10/2021)  ,  _______________________________________________________________________  Family History:  family history includes Anemia in her cousin; Diabetes in her maternal grandmother; Emphysema in her father; Heart attack (age of onset: 52) in her father; Heart disease in her father; Hypothyroidism in her father and mother; Psoriasis in her paternal grandmother; Varicose Veins in her mother and sister  ,  _______________________________________________________________________  Social History:   reports that she quit smoking about 6 years ago  Her smoking use included cigarettes  She has a 5 00 pack-year smoking history  She has never used smokeless tobacco  She reports current alcohol use  She reports that she does not use drugs  ,  _______________________________________________________________________  Allergies:  is allergic to nickel     _______________________________________________________________________  Current Outpatient Medications   Medication Sig Dispense Refill    traMADol (ULTRAM) 50 mg tablet Take 50 mg by mouth every 12 (twelve) hours as needed      ALPRAZolam (XANAX) 0 5 mg tablet Take 0 5 mg by mouth as needed        calcipotriene (DOVONEX) 0 005 % cream Apply 1 application topically 2 (two) times a day To affected area      ergocalciferol (VITAMIN D2) 50,000 units Take 50,000 Units by mouth once a week Takes on Mondays       escitalopram (LEXAPRO) 20 mg tablet Take 20 mg by mouth daily in the early morning      levothyroxine 25 mcg tablet Take 1 tablet (25 mcg total) by mouth daily (Patient taking differently: Take 25 mcg by mouth daily in the early morning  ) 90 tablet 1    pregabalin (LYRICA) 75 mg capsule Take 75 mg by mouth 3 (three) times a day      secukinumab (Cosentyx Sensoready Pen) 150 mg/mL SOAJ injection Inject 300 mg under the skin       No current facility-administered medications for this visit      _______________________________________________________________________  Review of Systems   Constitutional: Positive for fatigue  Negative for activity change, appetite change, chills, fever and unexpected weight change  HENT: Negative for congestion, ear discharge, ear pain, postnasal drip, sinus pressure and sore throat  Eyes: Negative for discharge and visual disturbance  Respiratory: Negative for cough, shortness of breath and wheezing  Cardiovascular: Negative for chest pain, palpitations and leg swelling  Gastrointestinal: Negative for abdominal pain, constipation, diarrhea, nausea and vomiting  Endocrine: Negative for cold intolerance, heat intolerance, polydipsia and polyuria  Genitourinary: Negative for difficulty urinating and frequency  Musculoskeletal: Negative for arthralgias, back pain, joint swelling and myalgias  Skin: Negative for rash  Neurological: Negative for dizziness, weakness, light-headedness, numbness and headaches  Hematological: Negative for adenopathy  Psychiatric/Behavioral: Positive for sleep disturbance  Negative for behavioral problems, confusion, dysphoric mood and suicidal ideas  The patient is not nervous/anxious  Objective:  Vitals:    02/17/22 0915   BP: 136/90   Pulse: 64   Temp: 97 9 °F (36 6 °C)   SpO2: 98%   Weight: 102 kg (224 lb)   Height: 5' 5" (1 651 m)     Body mass index is 37 28 kg/m²  Physical Exam  Vitals and nursing note reviewed  Constitutional:       General: She is not in acute distress       Appearance: She is well-developed  She is not diaphoretic  HENT:      Head: Normocephalic and atraumatic  Right Ear: Hearing, tympanic membrane, ear canal and external ear normal       Left Ear: Hearing, tympanic membrane, ear canal and external ear normal       Nose: Nose normal       Mouth/Throat:      Pharynx: No oropharyngeal exudate  Comments: Tonsillectomy  Eyes:      Conjunctiva/sclera: Conjunctivae normal       Pupils: Pupils are equal, round, and reactive to light  Neck:      Thyroid: No thyromegaly  Cardiovascular:      Rate and Rhythm: Normal rate and regular rhythm  Heart sounds: Normal heart sounds  No murmur heard  No friction rub  No gallop  Pulmonary:      Effort: Pulmonary effort is normal  No respiratory distress  Breath sounds: Normal breath sounds  No wheezing or rales  Chest:      Chest wall: No tenderness  Abdominal:      General: Bowel sounds are normal  There is no distension  Palpations: Abdomen is soft  There is no mass  Tenderness: There is no abdominal tenderness  There is no guarding or rebound  Musculoskeletal:         General: Normal range of motion  Cervical back: Neck supple  Lymphadenopathy:      Cervical: No cervical adenopathy  Skin:     General: Skin is warm and dry  Findings: No rash  Neurological:      General: No focal deficit present  Mental Status: She is alert and oriented to person, place, and time  Psychiatric:         Behavior: Behavior normal          Thought Content:  Thought content normal          Judgment: Judgment normal

## 2022-02-18 NOTE — TELEPHONE ENCOUNTER
Upon review of the In Basket request we were able to locate, review, and update the patient chart as requested for Hepatitis C , Mammogram and Pap Smear (HPV) aka Cervical Cancer Screening  Any additional questions or concerns should be emailed to the Practice Liaisons via [email or web address] email, please do not reply via In Basket      Thank you  Jeyson Wells

## 2022-05-27 ENCOUNTER — OFFICE VISIT (OUTPATIENT)
Dept: FAMILY MEDICINE CLINIC | Facility: CLINIC | Age: 42
End: 2022-05-27
Payer: COMMERCIAL

## 2022-05-27 VITALS
TEMPERATURE: 95.9 F | BODY MASS INDEX: 37.15 KG/M2 | OXYGEN SATURATION: 97 % | WEIGHT: 223 LBS | HEIGHT: 65 IN | DIASTOLIC BLOOD PRESSURE: 87 MMHG | SYSTOLIC BLOOD PRESSURE: 123 MMHG | HEART RATE: 63 BPM

## 2022-05-27 DIAGNOSIS — M79.7 CHRONIC FATIGUE SYNDROME WITH FIBROMYALGIA: ICD-10-CM

## 2022-05-27 DIAGNOSIS — M79.7 FIBROMYALGIA AFFECTING MULTIPLE SITES: Primary | ICD-10-CM

## 2022-05-27 DIAGNOSIS — L40.50 PSORIATIC ARTHRITIS (HCC): ICD-10-CM

## 2022-05-27 DIAGNOSIS — R53.82 CHRONIC FATIGUE SYNDROME WITH FIBROMYALGIA: ICD-10-CM

## 2022-05-27 DIAGNOSIS — G47.9 SLEEP DISTURBANCE: ICD-10-CM

## 2022-05-27 PROBLEM — Z76.89 ENCOUNTER TO ESTABLISH CARE: Status: RESOLVED | Noted: 2022-02-17 | Resolved: 2022-05-27

## 2022-05-27 PROBLEM — G93.32 CHRONIC FATIGUE SYNDROME WITH FIBROMYALGIA: Status: ACTIVE | Noted: 2022-05-27

## 2022-05-27 PROCEDURE — 99214 OFFICE O/P EST MOD 30 MIN: CPT | Performed by: FAMILY MEDICINE

## 2022-05-27 RX ORDER — RISANKIZUMAB-RZAA 150 MG/ML
150 INJECTION SUBCUTANEOUS
COMMUNITY
Start: 2022-05-26

## 2022-05-27 RX ORDER — TRAZODONE HYDROCHLORIDE 50 MG/1
50 TABLET ORAL
Qty: 30 TABLET | Refills: 3 | Status: SHIPPED | OUTPATIENT
Start: 2022-05-27

## 2022-05-27 NOTE — PROGRESS NOTES
Assessment/Plan:         Problem List Items Addressed This Visit        Nervous and Auditory    Chronic fatigue syndrome with fibromyalgia     Discussed fatigue is multifactorial  Will try Trazodone to improve her sleep  Advised exercise, healthy diet  Musculoskeletal and Integument    Psoriatic arthritis ([facility] [address] )     Recently started on Skyrizi  Following with Rheumatology              Other    Fibromyalgia affecting multiple sites - Primary     Following with Rheum  Is on Lexapro, Tramadol           Sleep disturbance     Add Trazodone at bedtime  Discussed common side effects  Consider sleep medicine referral            Relevant Medications    traZODone (DESYREL) 50 mg tablet            Subjective:      Patient ID: Jm Sahu is a 39 y o  female  35-year-old female with history of psoriatic arthritis and fibromyalgia here for checkup  She saw her rheumatologist recently and was changed from Cosentyx to [place]  She has some difficulty sleeping for awhile  She had a 2 sleep studies done this past year which was reportedly normal  Not in [place] system  She said it showed mild sleep apnea but the doctor told her she did not need CPAP  She had her tonsils out which did not help her fatigue  She has some interrupted sleep and constant fatigue  She tried switching her Lexapro to night time but it seemed to keep her more awake  She says she cannot get through the day without a nap  Bedtime is 9 pm  Some nights falls asleep within 30 min, some nights cannot fall asleep at all and is up all night  She had asked about possibly treatment for chronic fatigue with Provigil or Adderall        The following portions of the patient's history were reviewed and updated as appropriate:   Past Medical History:  She has a past medical history of Anxiety, 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[facility] [address] ), Disease of thyroid gland, Ear problems, Fatty liver, GERD (gastroesophageal reflux disease), Hashimoto's disease, Heart murmur, Mitral valve prolapse, Obesity (BMI 30-39 9), Prothrombin gene mutation ([facility] [address] ), Psoriatic arthritis ([facility] [address] ), Sleep difficulties, SVT (supraventricular tachycardia) ([facility] [address] ), and Varicosities of leg ,  _______________________________________________________________________  Medical Problems:  does not have any pertinent problems on file ,  _______________________________________________________________________  Past Surgical History:   has a past surgical history that includes Cholecystectomy; Cardiac electrophysiology study and ablation; Cervical biopsy w/ loop electrode excision (2004); [place] tooth extraction; Colonoscopy; and pr remove tonsils/adenoids,12+ y/o (N/A, 12/10/2021)  ,  _______________________________________________________________________  Family History:  family history includes Anemia in her cousin; Diabetes in her maternal grandmother; Emphysema in her father; Heart attack (age of onset: 52) in her father; Heart disease in her father; Hypothyroidism in her father and mother; Psoriasis in her paternal grandmother; Varicose Veins in her mother and sister  ,  _______________________________________________________________________  Social History:   reports that she quit smoking about 6 years ago  Her smoking use included cigarettes  She has a 5 00 pack-year smoking history  She has never used smokeless tobacco  She reports current alcohol use  She reports that she does not use drugs  ,  _______________________________________________________________________  Allergies:  is allergic to nickel     _______________________________________________________________________  Current Outpatient Medications   Medication Sig Dispense Refill    Risankizumab-rzaa (Skyrizi Pen) 150 MG/ML SOAJ Inject 150 mg under the skin      traZODone (DESYREL) 50 mg tablet Take 1 tablet (50 mg total) by mouth daily at bedtime 30 tablet 3    ALPRAZolam (XANAX) 0 5 mg tablet Take 0 5 mg by mouth as needed        calcipotriene (DOVONEX) 0 005 % cream Apply 1 application topically 2 (two) times a day To affected area      ergocalciferol (VITAMIN D2) 50,000 units Take 50,000 Units by mouth once a week Takes on Mondays       escitalopram (LEXAPRO) 20 mg tablet Take 20 mg by mouth daily in the early morning      levothyroxine 25 mcg tablet Take 1 tablet (25 mcg total) by mouth daily (Patient taking differently: Take 25 mcg by mouth daily in the early morning  ) 90 tablet 1    pregabalin (LYRICA) 75 mg capsule Take 75 mg by mouth 3 (three) times a day      traMADol (ULTRAM) 50 mg tablet Take 50 mg by mouth every 12 (twelve) hours as needed       No current facility-administered medications for this visit      _______________________________________________________________________  Review of Systems   Constitutional: Positive for fatigue  Negative for activity change  Respiratory: Negative for chest tightness and shortness of breath  Cardiovascular: Negative for chest pain and leg swelling  Musculoskeletal: Positive for arthralgias  Skin: Positive for rash  Neurological: Negative for headaches  Psychiatric/Behavioral: Positive for sleep disturbance  Negative for dysphoric mood  The patient is not nervous/anxious  Objective:  Vitals:    05/27/22 0950   BP: 123/87   Pulse: 63   Temp: (!) 95 9 °F (35 5 °C)   SpO2: 97%   Weight: 101 kg (223 lb)   Height: 5' 5" (1 651 m)     Body mass index is 37 11 kg/m²  Physical Exam  Vitals and nursing note reviewed  Constitutional:       General: She is not in acute distress  Appearance: Normal appearance  She is obese  She is not ill-appearing, toxic-appearing or diaphoretic  HENT:      Head: Normocephalic and atraumatic  Right Ear: External ear normal       Left Ear: External ear normal    Cardiovascular:      Rate and Rhythm: Normal rate and regular rhythm  Heart sounds: No murmur heard  No friction rub     Pulmonary:      Effort: Pulmonary effort is normal  No respiratory distress  Breath sounds: Normal breath sounds  No stridor  No wheezing, rhonchi or rales  Musculoskeletal:         General: No swelling  Right lower leg: No edema  Left lower leg: No edema  Neurological:      General: No focal deficit present  Mental Status: She is alert  Mental status is at baseline  Psychiatric:         Attention and Perception: Attention normal          Mood and Affect: Mood normal          Speech: Speech normal          Behavior: Behavior normal          Thought Content:  Thought content normal          Judgment: Judgment normal

## 2022-05-27 NOTE — ASSESSMENT & PLAN NOTE
Discussed fatigue is multifactorial  Will try Trazodone to improve her sleep  Advised exercise, healthy diet

## 2022-09-07 NOTE — PROGRESS NOTES
Assessment and Plan:   Patient is a 14-year-old female with history of psoriatic arthritis and fibromyalgia who presents for rheumatology consult  She has been following with [facility] over the past several years but wanted another opinion regarding ongoing treatment  She feels in the last few years she has not been improving much and has difficulty with various medications  We reviewed her medication history and I also reviewed her records  She reports significant issues of fatigue with Humira and Enbrel and so can not say whether they really improved her joint pain  She reports that she got much worse on Cimzia and Cosentyx  She is now on Skyrizi and only has had 2 injections so far  Methotrexate was added about 1 month ago which she is also reporting side effects from  She reports recent prednisone course provided no benefit  She does also have a history of fibromyalgia and is on Lyrica for this at this time but also previously tried Cymbalta, gabapentin and tramadol, and either had no benefit or again had intolerance  Her history and exam today are most suggestive of fibromyalgia as the predominant issue rather than an active inflammatory issue  I did discuss this with her in detail  She has planned follow-up with her regular rheumatologist regarding the Skyrizi and we discussed that I do not think switching biologic would help her pain at this point since again fibromyalgia seems to be more of the issue for her  However she is complaining about widespread rash on the psoriasis and so switching the biologic for this purpose might be indicated  I also suggested establishing with Dermatology to see if they had additional recommendations regarding the skin part  I otherwise would not recommend other treatment changes  She is already on the Lyrica and has tried other medications with intolerance for the fibromyalgia    She is going to follow-up with her rheumatologist at [facility]/ [facility] and possibly follow-up again with someone else in our group down the road  Plan:  Diagnoses and all orders for this visit:    Psoriatic arthritis ([address] )    Screening-pulmonary TB    Encounter for screening for other viral diseases    Fibromyalgia    High risk medication use    Other orders  -     methotrexate 7 5 MG tablet; Take 7 5 mg by mouth once a week        Follow-up plan: will f/u with [facility] rheum for now      KRYS Parmar is a 43 y o   female with anxiety, fibromyalgia, Hashimoto's hypothyroidism, obesity, GERD, vitamin-D deficiency, SVT, who presents for rheumatology consult by request of Dr Linda Andrew for psoriatic arthritis  Patient previously followed with Dr Arnulfo Almonte at [facility] rheumatology  Records reviewed, last seen 7/29/22  She was diagnosed with psoriatic arthritis in the last several years  Prior to this she had a longstanding rash which was ultimately diagnosed as plaque psoriasis  She did try treatment with Chino Dudley, Humira both biweekly and then weekly, Cimzia, Enbrel which was weekly and then increased to twice weekly, then Cosentyx, and most recently started on Skyrizi in mid-June with the initial loading doses  No review also shows that her previous rheumatologist felt there was a large component of noninflammatory pain from fibromyalgia and had her on treatment with Cymbalta, Lyrica, and at 1 point tramadol  During pregnancy in 2016, she noticed some joint pain in the hands and feet but not too bad  Then post-partum she had significant increase in joint pain in hands and feet and then saw rheum in 4/2017  She reports she is currently on the skyrizi, had 2 doses so far, and has 3rd dose in Oct    Recently did a prednisone course which did not help much  Usually she does get benefit  She was started on MTX 7 5mg/week, but so far has had fatigue and nausea each week after the dose  Also has noted increased hair loss on the MTX     Reports she had side effects that were bad with humira  Mostly it was fatigue  With the enbrel, she was also more fatigued  Also tried cimzia, cosentyx, both without benefit  Feels much worse on these 2 compared to enbrel and humira  She reports having psoriasis on her arms, legs, scalp, buttock and groins  No photosensitivity, oral or nasal ulcers, alopecia, Raynaud's, h/o pericarditis or pleurisy, h/o blood clots or miscarriages  On lyrica for fibromyalgia, makes her very tired  Also not helping at all  She was on cymbalta but also did not tolerate this  Believes she try gabapentin and did not tolerate this  Tramadol did not help her and also made her tired  She seems to have intolerance to multiple medications  Review of Systems  Review of Systems   Constitutional: Positive for fatigue  Negative for fever and unexpected weight change  HENT: Negative for mouth sores  Respiratory: Negative for cough and shortness of breath  Cardiovascular: Negative for chest pain and leg swelling  Gastrointestinal: Negative for abdominal pain, constipation and diarrhea  Musculoskeletal: Positive for arthralgias and joint swelling  Negative for back pain and myalgias  Skin: Positive for rash  Negative for color change  Neurological: Negative for weakness  Hematological: Negative for adenopathy  Psychiatric/Behavioral: Negative for sleep disturbance         Allergies  Allergies   Allergen Reactions    Nickel Hives, Itching and Rash     "Localized"       Home Medications    Current Outpatient Medications:     methotrexate 7 5 MG tablet, Take 7 5 mg by mouth once a week, Disp: , Rfl:     ALPRAZolam (XANAX) 0 5 mg tablet, Take 0 5 mg by mouth as needed  , Disp: , Rfl:     calcipotriene (DOVONEX) 0 005 % cream, Apply 1 application topically 2 (two) times a day To affected area, Disp: , Rfl:     ergocalciferol (VITAMIN D2) 50,000 units, Take 50,000 Units by mouth once a week Takes on Mondays , Disp: , Rfl:     escitalopram (LEXAPRO) 20 mg tablet, Take 20 mg by mouth daily in the early morning, Disp: , Rfl:     levothyroxine 25 mcg tablet, Take 1 tablet (25 mcg total) by mouth daily (Patient taking differently: Take 25 mcg by mouth daily in the early morning  ), Disp: 90 tablet, Rfl: 1    pregabalin (LYRICA) 75 mg capsule, Take 75 mg by mouth 3 (three) times a day, Disp: , Rfl:     Risankizumab-rzaa (Skyrizi Pen) 150 MG/ML SOAJ, Inject 150 mg under the skin, Disp: , Rfl:     traMADol (ULTRAM) 50 mg tablet, Take 50 mg by mouth every 12 (twelve) hours as needed, Disp: , Rfl:     Past Medical History  Past Medical History:   Diagnosis Date    Anxiety     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    Disease of thyroid gland     Ear problems     Fatty liver     GERD (gastroesophageal reflux disease)     Hashimoto's disease     Heart murmur     Mitral valve prolapse     Obesity (BMI 30-39  9)     Prothrombin gene mutation (HCC)     Factor II    Psoriatic arthritis ([address] )     Sleep difficulties     SVT (supraventricular tachycardia) (HCC)     Varicosities of leg        Past Surgical History   Past Surgical History:   Procedure Laterality Date    CARDIAC ELECTROPHYSIOLOGY STUDY AND ABLATION      X 2 for SVT    CERVICAL BIOPSY  W/ LOOP ELECTRODE EXCISION  2004    CHOLECYSTECTOMY      COLONOSCOPY      MN REMOVE TONSILS/ADENOIDS,12+ Y/O N/A 12/10/2021    Procedure: TONSILLECTOMY & ADENOIDECTOMY;  Surgeon: Bertha Morillo MD;  [facility];  Service: ENT    WISDOM TOOTH EXTRACTION         Family History  No known family history of autoimmune or inflammatory diseases      Family History   Problem Relation Age of Onset    Hypothyroidism Mother     Varicose Veins Mother     Hypothyroidism Father     Heart disease Father     Heart attack Father 52    Emphysema Father     Varicose Veins Sister     Diabetes Maternal Grandmother     Psoriasis Paternal Grandmother     Anemia Cousin        Social History  Occupation: not working now   Social History     Substance and Sexual Activity   Alcohol Use Yes    Comment: rare Socially     Social History     Substance and Sexual Activity   Drug Use Never     Social History     Tobacco Use   Smoking Status Former Smoker    Packs/day: 0 50    Years: 10 00    Pack years: 5 00    Types: Cigarettes    Quit date:     Years since quittin 6   Smokeless Tobacco Never Used       Objective:    Vitals:    22 1334   BP: 126/76   Pulse: 63   SpO2: 99%   Weight: 103 kg (227 lb 9 6 oz)   Height: 5' 5" (1 651 m)       Physical Exam  Constitutional:       General: She is not in acute distress  HENT:      Head: Normocephalic and atraumatic  Eyes:      Conjunctiva/sclera: Conjunctivae normal    Pulmonary:      Effort: Pulmonary effort is normal  No respiratory distress  Musculoskeletal:      Cervical back: Neck supple  Comments: No joint swelling, synovitis or dactylitis appreciated  Widespread reproducible tenderness suggestive of fibromyalgia  Skin:     Coloration: Skin is not pale  Neurological:      Mental Status: She is alert  Mental status is at baseline     Psychiatric:         Mood and Affect: Mood normal          Behavior: Behavior normal          Labs:    Ref Range & Units 22 12:15 PM   Sed Rate 0 - 20 mm/hr 29 High        Ref Range & Units 22 12:15 PM   C-Reactive Protein <7 0 mg/L 10 1 High        Ref Range & Units 22 12:15 PM   Glucose 65 - 99 mg/dL 88    BUN 7 - 25 mg/dL 11    Creatinine 0 4 - 1 1 mg/dL 0 62    Sodium 135 - 145 mmol/L 138    Potassium 3 5 - 5 2 mmol/L 4 3    Chloride 100 - 109 mmol/L 103    Carbon Dioxide 23 - 31 mmol/L 31    Calcium 8 5 - 10 1 mg/dL 9 5    Alkaline Phosphatase 35 - 120 U/L 122 High     Albumin 3 5 - 4 8 g/dL 3 8    Bilirubin, Total 0 2 - 1 mg/dL 0 6    Comment: Use of this assay is not recommended for patients undergoing treatment with eltrombopag due to the potential for falsely elevated results     Protein, Total 6 3 - 8 3 g/dL 7 5    AST <41 U/L 14    ALT <56 U/L 18    Anion Gap 3 - 11 4    eGFRcr >59 114       Ref Range & Units 9/6/22 12:15 PM   Hemoglobin 11 5 - 14 5 g/dL 14 2    Hematocrit 35 - 43 % 41 7    WBC 4 - 10 thou/cmm 9 6    RBC 3 7 - 4 7 mill/cmm 4 54    Platelet Count 269 - 350 thou/cmm 238    MPV 7 5 - 11 3 fL 9 1    MCV 80 - 100 fL 92    MCH 26 - 34 pg 31 2    MCHC 32 - 37 g/dL 33 9    RDW 12 - 16 % 13 0    Differential Type  AUTO    Absolute Neutrophils 1 8 - 7 8 thou/cmm 6 0    Absolute Lymphocytes 1 - 3 thou/cmm 2 9    Absolute Monocytes 0 3 - 1 thou/cmm 0 6    Absolute Eosinophils 0 - 0 5 thou/cmm 0 1    Absolute Basophils 0 - 0 1 thou/cmm 0 1    Neutrophils % 62    Lymphocytes % 30    Monocytes % 6    Eosinophils % 1    Basophils % 1       Ref Range & Units 4/19/21  9:52 AM   dsDNA AutoAb <10 TITER Negative    SSA (Ro) AutoAb <20 EVELIN Unit <20    SSB (La) AutoAb <20 EVELIN Unit <20    SM/RNP AutoAb Negative Negative    Sm (Sheriff) AutoAb Negative Negative    SCL-70 AutoAb Negative Negative    Antinuclear Abs Absent Present Abnormal     Comment: RAMÓN Testing Performed by Immunofluorescent Antibody   Nucleolar <80 titer 80 High       Component 4/19/21 12:00 AM    HEP C AB negative

## 2022-09-08 ENCOUNTER — OFFICE VISIT (OUTPATIENT)
Dept: RHEUMATOLOGY | Facility: CLINIC | Age: 42
End: 2022-09-08
Payer: COMMERCIAL

## 2022-09-08 VITALS
HEART RATE: 63 BPM | BODY MASS INDEX: 37.92 KG/M2 | SYSTOLIC BLOOD PRESSURE: 126 MMHG | DIASTOLIC BLOOD PRESSURE: 76 MMHG | HEIGHT: 65 IN | OXYGEN SATURATION: 99 % | WEIGHT: 227.6 LBS

## 2022-09-08 DIAGNOSIS — L40.50 PSORIATIC ARTHRITIS (HCC): Primary | ICD-10-CM

## 2022-09-08 DIAGNOSIS — Z11.59 ENCOUNTER FOR SCREENING FOR OTHER VIRAL DISEASES: ICD-10-CM

## 2022-09-08 DIAGNOSIS — Z11.1 SCREENING-PULMONARY TB: ICD-10-CM

## 2022-09-08 DIAGNOSIS — Z79.899 HIGH RISK MEDICATION USE: ICD-10-CM

## 2022-09-08 DIAGNOSIS — M79.7 FIBROMYALGIA: ICD-10-CM

## 2022-09-08 PROCEDURE — 99245 OFF/OP CONSLTJ NEW/EST HI 55: CPT | Performed by: INTERNAL MEDICINE

## 2022-09-09 ENCOUNTER — OFFICE VISIT (OUTPATIENT)
Dept: FAMILY MEDICINE CLINIC | Facility: CLINIC | Age: 42
End: 2022-09-09
Payer: COMMERCIAL

## 2022-09-09 VITALS
DIASTOLIC BLOOD PRESSURE: 84 MMHG | TEMPERATURE: 97.2 F | HEIGHT: 65 IN | BODY MASS INDEX: 37.49 KG/M2 | HEART RATE: 68 BPM | WEIGHT: 225 LBS | SYSTOLIC BLOOD PRESSURE: 120 MMHG | OXYGEN SATURATION: 97 %

## 2022-09-09 DIAGNOSIS — Z23 ENCOUNTER FOR IMMUNIZATION: ICD-10-CM

## 2022-09-09 DIAGNOSIS — M79.7 CHRONIC FATIGUE SYNDROME WITH FIBROMYALGIA: ICD-10-CM

## 2022-09-09 DIAGNOSIS — Z12.31 ENCOUNTER FOR SCREENING MAMMOGRAM FOR BREAST CANCER: ICD-10-CM

## 2022-09-09 DIAGNOSIS — G93.32 CHRONIC FATIGUE SYNDROME WITH FIBROMYALGIA: ICD-10-CM

## 2022-09-09 DIAGNOSIS — F41.9 ANXIETY: ICD-10-CM

## 2022-09-09 DIAGNOSIS — Z00.00 ANNUAL PHYSICAL EXAM: Primary | ICD-10-CM

## 2022-09-09 DIAGNOSIS — Z11.1 SCREENING FOR TUBERCULOSIS: ICD-10-CM

## 2022-09-09 DIAGNOSIS — G47.9 SLEEP DISTURBANCE: ICD-10-CM

## 2022-09-09 PROCEDURE — 99396 PREV VISIT EST AGE 40-64: CPT | Performed by: FAMILY MEDICINE

## 2022-09-09 PROCEDURE — 90686 IIV4 VACC NO PRSV 0.5 ML IM: CPT | Performed by: FAMILY MEDICINE

## 2022-09-09 PROCEDURE — 90471 IMMUNIZATION ADMIN: CPT | Performed by: FAMILY MEDICINE

## 2022-09-09 NOTE — PATIENT INSTRUCTIONS

## 2022-09-09 NOTE — PROGRESS NOTES
[id number] [address] [place] [address]    NAME: Karen Chapa  AGE: 43 y o  SEX: female  : 1980     DATE: 2022     Assessment and Plan:     Problem List Items Addressed This Visit        Nervous and Auditory    Chronic fatigue syndrome with fibromyalgia    Relevant Orders    Ambulatory Referral to Sleep Medicine       Other    Anxiety    Relevant Orders    Ambulatory Referral to Psychiatry    Ambulatory referral to Behavioral Health    Sleep disturbance    Relevant Orders    Ambulatory Referral to Sleep Medicine    Ambulatory Referral to Psychiatry      Other Visit Diagnoses     Annual physical exam    -  Primary    Encounter for immunization        Relevant Orders    influenza vaccine, quadrivalent, 0 5 mL, preservative-free, for adult and pediatric patients 6 mos+ ([facility], [address], FLUZONE)    Encounter for screening mammogram for breast cancer        Relevant Orders    Mammo screening bilateral w 3d & cad    Screening for tuberculosis        Relevant Orders    Quantiferon TB Gold Plus          Immunizations and preventive care screenings were discussed with patient today  Appropriate education was printed on patient's after visit summary  Counseling:  Alcohol/drug use: discussed moderation in alcohol intake, the recommendations for healthy alcohol use, and avoidance of illicit drug use  Dental Health: discussed importance of regular tooth brushing, flossing, and dental visits  Exercise: the importance of regular exercise/physical activity was discussed  Recommend exercise 3-5 times per week for at least 30 minutes  Referral to sleep medicine and psychiatry for chronic fatigue, insomnia, depression  BMI Counseling: Body mass index is 37 44 kg/m²  The BMI is above normal  Nutrition recommendations include encouraging healthy choices of fruits and vegetables  Exercise recommendations include exercising 3-5 times per week   No pharmacotherapy was ordered  Rationale for BMI follow-up plan is due to patient being overweight or obese  Return in about 3 months (around 12/9/2022) for Recheck  Chief Complaint:     Chief Complaint   Patient presents with    Fatigue     Chronic    Anxiety    Physical Exam      History of Present Illness:     Adult Annual Physical   Patient here for a comprehensive physical exam  The patient reports problems - not sleeping well - she was started on Trazodone  She does not feel refreshed when she gets up    She had a 2 sleep studies done this past year which was reportedly normal  Not in [address] [place] [address] system  She said it showed mild sleep apnea but the doctor told her she did not need CPAP  She had her tonsils out in 2021 because she snores, but this did not help her fatigue  She has interrupted sleep and constant fatigue  She asked about a stimulant  She has not seen a sleep specialist     She has depression and anxiety and is on Lexapro  Continues to struggle with her mood and fatigue  She has been out of work due to her fatigue  Needs Tuberculosis screening    Diet and Physical Activity  Diet/Nutrition: well balanced diet, consuming 3-5 servings of fruits/vegetables daily and a lot of carbs  Exercise: moderate cardiovascular exercise  Spin bike     Depression Screening  PHQ-2/9 Depression Screening         General Health  Sleep: sleeps poorly  6 hours average  Wakes up in the night feels like her legs are restless or has joint pain  Hearing: no issues  Vision: goes for regular eye exams  Dental: regular dental visits  /GYN Health  Patient is: premenopausal  Mammogram due in November  Review of Systems:     Review of Systems   Constitutional: Positive for fatigue  Negative for activity change, appetite change, chills, fever and unexpected weight change  HENT: Negative for congestion, ear discharge, ear pain, postnasal drip, sinus pressure and sore throat      Eyes: Negative for discharge and visual disturbance  Respiratory: Negative for cough, shortness of breath and wheezing  Cardiovascular: Negative for chest pain, palpitations and leg swelling  Gastrointestinal: Negative for abdominal pain, constipation, diarrhea, nausea and vomiting  Endocrine: Negative for cold intolerance, heat intolerance, polydipsia and polyuria  Genitourinary: Negative for difficulty urinating and frequency  Musculoskeletal: Positive for arthralgias and myalgias  Negative for back pain and joint swelling  Skin: Negative for rash  Neurological: Negative for dizziness, weakness, light-headedness, numbness and headaches  Hematological: Negative for adenopathy  Psychiatric/Behavioral: Positive for dysphoric mood and sleep disturbance  Negative for behavioral problems, confusion and suicidal ideas  The patient is not nervous/anxious  Past Medical History:     Past Medical History:   Diagnosis Date    Anxiety     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[facility] [address] )     Prothrombin Gene Mutation Factor II    Disease of thyroid gland     Ear problems     Fatty liver     GERD (gastroesophageal reflux disease)     Hashimoto's disease     Heart murmur     Mitral valve prolapse     Obesity (BMI 30-39  9)     Prothrombin gene mutation (HCC)     Factor II    [facility]     Sleep difficulties     SVT (supraventricular tachycardia) (HCC)     Varicosities of leg       Past Surgical History:     Past Surgical History:   Procedure Laterality Date    CARDIAC ELECTROPHYSIOLOGY STUDY AND ABLATION      X 2 for SVT    CERVICAL BIOPSY  W/ LOOP ELECTRODE EXCISION  2004    CHOLECYSTECTOMY      COLONOSCOPY      MS REMOVE TONSILS/ADENOIDS,12+ Y/O N/A 12/10/2021    Procedure: TONSILLECTOMY & ADENOIDECTOMY;  Surgeon: Asher Romo MD;  [facility];  Service: ENT    WISDOM TOOTH EXTRACTION        Social History:     Social History     Socioeconomic History    Marital status: Single     Spouse name: None  Number of children: None    Years of education: 2 yr college    Highest education level: None   Occupational History    None   Tobacco Use    Smoking status: Former Smoker     Packs/day: 0 50     Years: 10 00     Pack years: 5 00     Types: Cigarettes     Quit date:      Years since quittin 6    Smokeless tobacco: Never Used   Vaping Use    Vaping Use: Never used   Substance and Sexual Activity    Alcohol use: Yes     Comment: rare Socially    Drug use: Never    Sexual activity: None   Other Topics Concern    None   Social History Narrative    · Most recent tobacco use screenin2019      · Do you currently or have you served in [employer]:   No      · Live alone or with others:   with others      · Are you currently employed: Yes      · Alcohol intake:   Occasional      · Illicit drugs:   none      · Caffeine intake:    Moderate      · Diet:   Regular      · Exercise level:   Occasional      · Education:   2 Year College      · Guns present in home:   No      · Seat belts used routinely:   Yes      · Sexual orientation:   Heterosexual      · Sunscreen used routinely:   Yes      · General stress level:   Medium      Social Determinants of Health     Financial Resource Strain: Not on file   Food Insecurity: Not on file   Transportation Needs: Not on file   Physical Activity: Not on file   Stress: Not on file   Social Connections: Not on file   Intimate Partner Violence: Not on file   Housing Stability: Not on file      Family History:     Family History   Problem Relation Age of Onset    Hypothyroidism Mother     Varicose Veins Mother     Hypothyroidism Father     Heart disease Father     Heart attack Father 52    Emphysema Father     Varicose Veins Sister     Diabetes Maternal Grandmother     Psoriasis Paternal Grandmother     Anemia Cousin       Current Medications:     Current Outpatient Medications   Medication Sig Dispense Refill    ALPRAZolam (XANAX) 0 5 mg tablet Take 0 5 mg by mouth as needed        calcipotriene (DOVONEX) 0 005 % cream Apply 1 application topically 2 (two) times a day To affected area      ergocalciferol (VITAMIN D2) 50,000 units Take 50,000 Units by mouth once a week Takes on Mondays       escitalopram (LEXAPRO) 20 mg tablet Take 20 mg by mouth daily in the early morning      levothyroxine 25 mcg tablet Take 1 tablet (25 mcg total) by mouth daily (Patient taking differently: Take 25 mcg by mouth daily in the early morning  ) 90 tablet 1    methotrexate 7 5 MG tablet Take 7 5 mg by mouth once a week      pregabalin (LYRICA) 75 mg capsule Take 75 mg by mouth 3 (three) times a day      Risankizumab-rzaa (Skyrizi Pen) 150 MG/ML SOAJ Inject 150 mg under the skin       No current facility-administered medications for this visit  Allergies: Allergies   Allergen Reactions    Nickel Hives, Itching and Rash     "Localized"      Physical Exam:     /84   Pulse 68   Temp (!) 97 2 °F (36 2 °C)   Ht 5' 5" (1 651 m)   Wt 102 kg (225 lb)   SpO2 97%   BMI 37 44 kg/m²     Physical Exam  Constitutional:       General: She is not in acute distress  Appearance: Normal appearance  She is well-developed  She is not ill-appearing, toxic-appearing or diaphoretic  HENT:      Head: Normocephalic and atraumatic  Right Ear: Tympanic membrane, ear canal and external ear normal       Left Ear: Tympanic membrane, ear canal and external ear normal       Mouth/Throat:      Mouth: Mucous membranes are moist       Pharynx: Oropharynx is clear  No oropharyngeal exudate  Comments: Crowded upper airway  Eyes:      General: No scleral icterus  Right eye: No discharge  Left eye: No discharge  Conjunctiva/sclera: Conjunctivae normal       Pupils: Pupils are equal, round, and reactive to light  Neck:      Thyroid: No thyromegaly        Comments: Large neck circumference  Cardiovascular:      Rate and Rhythm: Normal rate and regular rhythm  Heart sounds: Normal heart sounds  No murmur heard  No friction rub  No gallop  Pulmonary:      Effort: Pulmonary effort is normal  No respiratory distress  Breath sounds: Normal breath sounds  No wheezing or rales  Chest:      Chest wall: No tenderness  Abdominal:      General: Bowel sounds are normal  There is no distension  Palpations: Abdomen is soft  There is no mass  Tenderness: There is no abdominal tenderness  There is no guarding or rebound  Hernia: No hernia is present  Musculoskeletal:         General: No swelling  Lymphadenopathy:      Cervical: No cervical adenopathy  Skin:     General: Skin is warm  Findings: No rash  Neurological:      Mental Status: She is alert and oriented to person, place, and time  Cranial Nerves: No cranial nerve deficit  Psychiatric:         Mood and Affect: Mood normal          Behavior: Behavior normal          Thought Content:  Thought content normal          Judgment: Judgment normal           Elinor Sethi MD  [address] [place] [address] [id number]

## 2022-09-12 ENCOUNTER — TELEPHONE (OUTPATIENT)
Dept: PSYCHIATRY | Facility: CLINIC | Age: 42
End: 2022-09-12

## 2022-09-12 ENCOUNTER — TELEPHONE (OUTPATIENT)
Dept: SLEEP CENTER | Facility: CLINIC | Age: 42
End: 2022-09-12

## 2022-09-13 ENCOUNTER — TELEPHONE (OUTPATIENT)
Dept: DERMATOLOGY | Age: 42
End: 2022-09-13

## 2022-09-13 ENCOUNTER — OFFICE VISIT (OUTPATIENT)
Dept: PULMONOLOGY | Facility: CLINIC | Age: 42
End: 2022-09-13
Payer: COMMERCIAL

## 2022-09-13 VITALS
DIASTOLIC BLOOD PRESSURE: 76 MMHG | RESPIRATION RATE: 18 BRPM | SYSTOLIC BLOOD PRESSURE: 122 MMHG | HEIGHT: 65 IN | HEART RATE: 72 BPM | OXYGEN SATURATION: 97 % | TEMPERATURE: 97.9 F | BODY MASS INDEX: 36.65 KG/M2 | WEIGHT: 220 LBS

## 2022-09-13 DIAGNOSIS — M79.7 CHRONIC FATIGUE SYNDROME WITH FIBROMYALGIA: ICD-10-CM

## 2022-09-13 DIAGNOSIS — G47.9 SLEEP DISTURBANCE: ICD-10-CM

## 2022-09-13 DIAGNOSIS — G25.81 RLS (RESTLESS LEGS SYNDROME): ICD-10-CM

## 2022-09-13 DIAGNOSIS — G93.32 CHRONIC FATIGUE SYNDROME WITH FIBROMYALGIA: ICD-10-CM

## 2022-09-13 DIAGNOSIS — E66.9 OBESITY (BMI 30-39.9): ICD-10-CM

## 2022-09-13 DIAGNOSIS — G47.19 EXCESSIVE DAYTIME SLEEPINESS: Primary | ICD-10-CM

## 2022-09-13 PROCEDURE — 99244 OFF/OP CNSLTJ NEW/EST MOD 40: CPT | Performed by: INTERNAL MEDICINE

## 2022-09-13 NOTE — PROGRESS NOTES
Assessment/Plan:     Diagnoses and all orders for this visit:    Excessive daytime sleepiness  -     Cancel: Multiple sleep latency test with diagnostic study; Future  -     Multiple sleep latency test with diagnostic study; Future    Sleep disturbance  -     Ambulatory Referral to Sleep Medicine    Chronic fatigue syndrome with fibromyalgia  -     Ambulatory Referral to Sleep Medicine    RLS (restless legs syndrome)  -     Ferritin; Future    Obesity (BMI 30-39 9)      excessive daytime sleepiness with [place] sleepiness score of 14   Recent diagnostic sleep study in April of 2022 per patient no evidence of sleep disordered breathing does not have the study report study done at [facility] she states   Given excessive daytime sleepiness/chronic fatigue also likely contributed by her rheumatoid arthritis as well as chronic pain syndrome, with fibromyalgia need to rule out central causes for excessive daytime sleepiness   Will get a diagnostic sleep study followed by multiple sleep latency testing if the nighttime a diagnostic test shows AHI greater than 5 or PLMD greater than 10 the daytime multiple sleep latency testing will be canceled   Testing procedure was discussed in detail   Cautioned against driving when sleepy  Also symptoms of restless legs, will get ferritin levels and follow-up   Recommend weight loss   Follow-up after the above testing  Plan for follow up:  Return in about 3 months (around 12/13/2022)  All questions are answered to the patient's satisfaction and understanding  She verbalizes understanding  She is encouraged to call with any further questions or concerns  Portions of the record may have been created with voice recognition software  Occasional wrong word or "sound a like" substitutions may have occurred due to the inherent limitations of voice recognition software    Read the chart carefully and recognize, using context, where substitutions have occurred  a    Electronically Signed by Oliver Regalado MD    ______________________________________________________________________    Chief Complaint:   Chief Complaint   Patient presents with    sleep consult        Patient ID: Shabnam Cary is a 43 y o  y o  female has a past medical history of Anxiety, 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[address] ), Disease of thyroid gland, Ear problems, Fatty liver, GERD (gastroesophageal reflux disease), Hashimoto's disease, Heart murmur, Mitral valve prolapse, Obesity (BMI 30-39 9), Prothrombin gene mutation ([address] ), Psoriatic arthritis ([address] ), Sleep difficulties, SVT (supraventricular tachycardia) ([facility] ), and Varicosities of leg     9/13/2022  Patient presents today for initial visit  Patient is a very pleasant 42-year-old lady here for evaluation for excessive daytime sleepiness  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 states has not been working for the past year because of the underlying medical conditions with the rheumatoid arthritis and being on medications as well as the pandemic she stopped working  States she always was very physically active and always was on the go but for the past 2-3 years has been very tired and fatigued during the daytime  She has had extensive workup done by her PCP she states has hypothyroidism which has been treated as well as rheumatoid arthritis for which she follows up with Rheumatology and has been on medications she states  In spite of that being under control she states she is still tired and fatigue as well as sleepy during the daytime for which this referral has been made she also states she had a home sleep study done several months ago which was negative for obstructive sleep apnea and then she also had an in-lab study done in April of 2022 at [facility] states she was told there is no need for CPAP treatment records are not available          Occupational/Exposure history:  Was working as a registered nurse not working for the past 1 year  Pets/Enviroment:  None  Travel history:  None  Review of Systems   Constitutional: Positive for fatigue  HENT: Negative  Eyes: Negative  Respiratory: Negative  Mild Snoring, no witnessed apneic , no history of choking or gasping for air at night she states  Cardiovascular: Negative  Gastrointestinal: Negative  Endocrine: Negative  Genitourinary: Negative  Musculoskeletal: Negative  Chronic pain in the body   Allergic/Immunologic: Negative  Neurological: Positive for headaches  Hematological: Negative  Psychiatric/Behavioral: Positive for sleep disturbance  Social history: She reports that she quit smoking about 6 years ago  Her smoking use included cigarettes  She started smoking about 28 years ago  She has a 11 00 pack-year smoking history  She has never used smokeless tobacco  She reports previous alcohol use  She reports that she does not use drugs      Past surgical history:   Past Surgical History:   Procedure Laterality Date    CARDIAC ELECTROPHYSIOLOGY STUDY AND ABLATION      X 2 for SVT    CARDIAC SURGERY  2017 and 2018    cardiac ablation    CERVICAL BIOPSY  W/ LOOP ELECTRODE EXCISION  2004    CHOLECYSTECTOMY      COLONOSCOPY      AL REMOVE TONSILS/ADENOIDS,12+ Y/O N/A 12/10/2021    Procedure: TONSILLECTOMY & ADENOIDECTOMY;  Surgeon: Priya Chow MD;  [facility];  Service: ENT    TONSILLECTOMY  9/2021    WISDOM TOOTH EXTRACTION       Family history:   Family History   Problem Relation Age of Onset    Hypothyroidism Mother     Varicose Veins Mother     Asthma Mother     Hypothyroidism Father     Heart disease Father     Heart attack Father 52    Emphysema Father    Mavis Cousin COPD Father         former smoker    Heart failure Father         heart attack    Varicose Veins Sister     Diabetes Maternal Grandmother     Psoriasis Paternal Grandmother     Diabetes Paternal Grandmother     Anemia Cousin Immunization History   Administered Date(s) Administered    COVID-19 MODERNA VACC 0 5 ML IM 03/30/2021, 04/28/2021    INFLUENZA 10/15/2014, 10/06/2015, 10/06/2015, 10/08/2018    Influenza, injectable, quadrivalent, preservative free 0 5 mL 09/09/2022    Td (adult), adsorbed 05/27/2004    Tdap 10/21/2014    Tuberculin Skin Test-PPD Intradermal 07/27/2016     Current Outpatient Medications   Medication Sig Dispense Refill    ALPRAZolam (XANAX) 0 5 mg tablet Take 0 5 mg by mouth as needed        calcipotriene (DOVONEX) 0 005 % cream Apply 1 application topically 2 (two) times a day To affected area      ergocalciferol (VITAMIN D2) 50,000 units Take 50,000 Units by mouth once a week Takes on Mondays       escitalopram (LEXAPRO) 20 mg tablet Take 20 mg by mouth daily in the early morning      levothyroxine 25 mcg tablet Take 1 tablet (25 mcg total) by mouth daily (Patient taking differently: Take 25 mcg by mouth daily in the early morning) 90 tablet 1    methotrexate 7 5 MG tablet Take 7 5 mg by mouth once a week      pregabalin (LYRICA) 75 mg capsule Take 75 mg by mouth 3 (three) times a day      Risankizumab-rzaa (Skyrizi Pen) 150 MG/ML SOAJ Inject 150 mg under the skin       No current facility-administered medications for this visit  Allergies: Nickel    Objective:  Vitals:    09/13/22 0955 09/13/22 0957   BP: 122/76    BP Location: Left arm    Patient Position: Sitting    Cuff Size: Large    Pulse: 72    Resp: 18    Temp: 97 9 °F (36 6 °C)    TempSrc: Tympanic    SpO2: 97% 97%   Weight: 99 8 kg (220 lb)    Height: 5' 5" (1 651 m)    Oxygen Therapy  SpO2: 97 %  Oxygen Therapy: None (Room air)    Wt Readings from Last 3 Encounters:   09/13/22 99 8 kg (220 lb)   09/09/22 102 kg (225 lb)   09/08/22 103 kg (227 lb 9 6 oz)     Body mass index is 36 61 kg/m²  Physical Exam  Constitutional:       Appearance: She is well-developed  HENT:      Head: Normocephalic and atraumatic     Eyes: Pupils: Pupils are equal, round, and reactive to light  Neck:      Comments: Short and wide neck  Cardiovascular:      Rate and Rhythm: Normal rate and regular rhythm  Heart sounds: Normal heart sounds  Pulmonary:      Effort: Pulmonary effort is normal       Breath sounds: Normal breath sounds  Musculoskeletal:         General: Normal range of motion  Cervical back: Normal range of motion and neck supple  Skin:     General: Skin is warm and dry  Neurological:      Mental Status: She is alert and oriented to person, place, and time  Psychiatric:         Behavior: Behavior normal            Diagnostics:  I have personally reviewed pertinent reports      ESS: Total score: 14

## 2022-09-22 DIAGNOSIS — F41.9 ANXIETY: Primary | ICD-10-CM

## 2022-09-29 ENCOUNTER — TELEPHONE (OUTPATIENT)
Dept: PSYCHIATRY | Facility: CLINIC | Age: 42
End: 2022-09-29

## 2022-09-29 NOTE — TELEPHONE ENCOUNTER
Reached out to patient in regards to message received that she has [address] and is not able to be seen by integrations therapist      Pt will need to either be on a wait list or be scheduled with intake if something is available

## 2022-10-06 ENCOUNTER — OFFICE VISIT (OUTPATIENT)
Dept: GASTROENTEROLOGY | Facility: CLINIC | Age: 42
End: 2022-10-06
Payer: COMMERCIAL

## 2022-10-06 VITALS
RESPIRATION RATE: 16 BRPM | HEART RATE: 65 BPM | HEIGHT: 65 IN | WEIGHT: 227 LBS | BODY MASS INDEX: 37.82 KG/M2 | OXYGEN SATURATION: 99 % | DIASTOLIC BLOOD PRESSURE: 78 MMHG | TEMPERATURE: 98.1 F | SYSTOLIC BLOOD PRESSURE: 118 MMHG

## 2022-10-06 DIAGNOSIS — R76.8 POSITIVE ANA (ANTINUCLEAR ANTIBODY): ICD-10-CM

## 2022-10-06 DIAGNOSIS — R10.13 EPIGASTRIC PAIN: Primary | ICD-10-CM

## 2022-10-06 DIAGNOSIS — E66.09 CLASS 1 OBESITY DUE TO EXCESS CALORIES WITHOUT SERIOUS COMORBIDITY WITH BODY MASS INDEX (BMI) OF 31.0 TO 31.9 IN ADULT: ICD-10-CM

## 2022-10-06 PROCEDURE — 99244 OFF/OP CNSLTJ NEW/EST MOD 40: CPT | Performed by: INTERNAL MEDICINE

## 2022-10-06 RX ORDER — MOMETASONE FUROATE 1 MG/G
1 CREAM TOPICAL DAILY
COMMUNITY
Start: 2022-09-13

## 2022-10-06 RX ORDER — CLOBETASOL PROPIONATE 0.46 MG/ML
SOLUTION TOPICAL
COMMUNITY
Start: 2022-10-05

## 2022-10-06 RX ORDER — MOMETASONE FUROATE 1 MG/G
CREAM TOPICAL DAILY
COMMUNITY
Start: 2022-09-13 | End: 2023-09-13

## 2022-10-06 RX ORDER — TERBINAFINE HYDROCHLORIDE 250 MG/1
TABLET ORAL
COMMUNITY
Start: 2022-10-05

## 2022-10-06 RX ORDER — CLOBETASOL PROPIONATE 0.5 MG/G
CREAM TOPICAL
COMMUNITY
Start: 2022-10-05

## 2022-10-06 RX ORDER — ETANERCEPT 50 MG/ML
SOLUTION SUBCUTANEOUS
COMMUNITY
Start: 2022-09-23

## 2022-10-06 NOTE — PROGRESS NOTES
[facility] - Outpatient Consultation  Kailey Kellogg 43 y o  female MRN: [id number]  Encounter: [id number]          ASSESSMENT AND PLAN:      1  Epigastric pain    2  Positive RAMÓN (antinuclear antibody)    This is a 63-year-old white female with psoriatic arthritis and a positive RAMÓN and smooth muscle antibody but normal LFTs  She also has fatty liver on ultrasound  With normal LFTs autoimmune hepatitis is unlikely at present  It is possible that she may develop autoimmune hepatitis in the future and she should be screened annually with LFTs for that  Because of her being overweight and having fatty liver she is at some risk of developing BEDOLLA  Weight loss is strongly advised  She is agreeable to have a dietary consult for dietary management  In any event even if she had autoimmune hepatitis the goal is generally to maintain normal LFTs with steroids or other immunosuppressive drugs  Since she has normal LFTs this time no treatment is required other than periodic screening  She also has been complaining of prolonged epigastric pain stool which has not improved status post cholecystectomy  Other possibilities include peptic ulcer disease, gastritis and H pylori infection  I will schedule her for an endoscopy to further evaluate this  The risk of perforation bleeding were discussed with the patient and she is agreeable to proceed with the procedure  Thank you so much for referring this patient  Thank you very much for referring this patient   ______________________________________________________________________    HPI:  Alyssa Cordero is a 63-year-old white female with a history of psoriatic arthritis who is on is on a biological for that  She is referred because of a positive smooth muscle antibody and a positive RAMÓN  She does have a fatty liver on ultrasound done three years ago  Her LFTs are normal     She has been complaining of upper abdominal pain    About two years ago she had a cholecystectomy but her abdominal pain has not changed  She denies any significant NSAID use  She is a nonsmoker, nondrinker  REVIEW OF SYSTEMS:    CONSTITUTIONAL: Denies any fever, chills, rigors, and weight loss  HEENT: No earache or tinnitus  Denies hearing loss or visual disturbances  CARDIOVASCULAR: No chest pain or palpitations  RESPIRATORY: Denies any cough, hemoptysis, shortness of breath or dyspnea on exertion  GASTROINTESTINAL: As noted in the History of Present Illness  GENITOURINARY: No problems with urination  Denies any hematuria or dysuria  NEUROLOGIC: No dizziness or vertigo, denies headaches  MUSCULOSKELETAL: Denies any muscle or joint pain  SKIN: Denies skin rashes or itching  ENDOCRINE: Denies excessive thirst  Denies intolerance to heat or cold  PSYCHOSOCIAL: Denies depression or anxiety  Denies any recent memory loss  Historical Information   Past Medical History:   Diagnosis Date    Anxiety     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 )     Prothrombin Gene Mutation Factor II    Disease of thyroid gland     Ear problems     Fatty liver     GERD (gastroesophageal reflux disease)     Hashimoto's disease     Heart murmur     Mitral valve prolapse     Obesity (BMI 30-39  9)     [facility]thrombin gene mutation [email or web address] )     Factor II    Psoriatic arthritis ([facility] )     Sleep difficulties     SVT (supraventricular tachycardia) (HCC)     Varicosities of leg      Past Surgical History:   Procedure Laterality Date    CARDIAC ELECTROPHYSIOLOGY STUDY AND ABLATION      X 2 for SVT    CARDIAC SURGERY  2017 and 2018    cardiac ablation    CERVICAL BIOPSY  W/ LOOP ELECTRODE EXCISION  2004    CHOLECYSTECTOMY      COLONOSCOPY      OR REMOVE TONSILS/ADENOIDS,12+ Y/O N/A 12/10/2021    Procedure: TONSILLECTOMY & ADENOIDECTOMY;  Surgeon: Isabel Ventura MD;  [facility];  Service: ENT    TONSILLECTOMY  9/2021    [address] EXTRACTION       Social History   Social History     Substance and Sexual Activity   Alcohol Use Not Currently    Comment: rare Socially     Social History     Substance and Sexual Activity   Drug Use Never     Social History     Tobacco Use   Smoking Status Former Smoker    Packs/day: 0 50    Years: 22 00    Pack years: 11 00    Types: Cigarettes    Start date: 12    Quit date: 2016    Years since quittin 7   Smokeless Tobacco Never Used     Family History   Problem Relation Age of Onset    Hypothyroidism Mother     Varicose Veins Mother     Asthma Mother     Hypothyroidism Father     Heart disease Father     Heart attack Father 52    Emphysema Father    Yariel Pert COPD Father         former smoker    Heart failure Father         heart attack    Varicose Veins Sister     Diabetes Maternal Grandmother     Psoriasis Paternal Grandmother     Diabetes Paternal Grandmother     Anemia Cousin        Meds/Allergies       Current Outpatient Medications:     ALPRAZolam (XANAX) 0 5 mg tablet    calcipotriene (DOVONEX) 0 005 % cream    clobetasol (TEMOVATE) 0 05 % cream    clobetasol (TEMOVATE) 0 05 % external solution    Enbrel SureClick 50 MG/ML injection    ergocalciferol (VITAMIN D2) 50,000 units    escitalopram (LEXAPRO) 20 mg tablet    levothyroxine 25 mcg tablet    mometasone (ELOCON) 0 1 % cream    mometasone (ELOCON) 0 1 % cream    pregabalin (LYRICA) 75 mg capsule    terbinafine (LamISIL) 250 mg tablet    methotrexate 7 5 MG tablet    Risankizumab-rzaa (Skyrizi Pen) 150 MG/ML SOAJ    Allergies   Allergen Reactions    Nickel Hives, Itching and Rash     "Localized"           Objective     Blood pressure 118/78, pulse 65, temperature 98 1 °F (36 7 °C), resp  rate 16, height 5' 5" (1 651 m), weight 103 kg (227 lb), SpO2 99 %  Body mass index is 37 77 kg/m²  PHYSICAL EXAM:      General Appearance:   Alert, cooperative, no distress   HEENT:   Normocephalic, atraumatic, anicteric       Neck:  Supple, symmetrical, trachea midline   Lungs:   Clear to auscultation bilaterally; no rales, rhonchi or wheezing; respirations unlabored    Heart[de-identified]   Regular rate and rhythm; no murmur, rub, or gallop  Abdomen:   Soft, non-tender, non-distended; normal bowel sounds; no masses, no organomegaly    Genitalia:   Deferred    Rectal:   Deferred    Extremities:  No cyanosis, clubbing or edema    Pulses:  2+ and symmetric    Skin:  No jaundice, rashes, or lesions    Lymph nodes:  No palpable cervical lymphadenopathy        Lab Results:   No visits with results within 1 Day(s) from this visit  Latest known visit with results is:   Telephone on 02/17/2022   Component Date Value    HEP C AB 04/19/2021 negative          Radiology Results:   No results found

## 2022-10-24 DIAGNOSIS — M79.7 CHRONIC FATIGUE SYNDROME WITH FIBROMYALGIA: Primary | ICD-10-CM

## 2022-10-24 DIAGNOSIS — G93.32 CHRONIC FATIGUE SYNDROME WITH FIBROMYALGIA: Primary | ICD-10-CM

## 2022-10-24 RX ORDER — MODAFINIL 100 MG/1
100 TABLET ORAL DAILY
Qty: 30 TABLET | Refills: 2 | Status: SHIPPED | OUTPATIENT
Start: 2022-10-24 | End: 2022-10-27

## 2022-10-27 ENCOUNTER — TELEPHONE (OUTPATIENT)
Dept: FAMILY MEDICINE CLINIC | Facility: CLINIC | Age: 42
End: 2022-10-27

## 2022-10-28 DIAGNOSIS — E66.01 MORBID OBESITY (HCC): Primary | ICD-10-CM

## 2022-11-01 ENCOUNTER — OFFICE VISIT (OUTPATIENT)
Dept: FAMILY MEDICINE CLINIC | Facility: CLINIC | Age: 42
End: 2022-11-01

## 2022-11-01 VITALS
HEIGHT: 65 IN | DIASTOLIC BLOOD PRESSURE: 74 MMHG | WEIGHT: 231 LBS | BODY MASS INDEX: 38.49 KG/M2 | TEMPERATURE: 97.9 F | SYSTOLIC BLOOD PRESSURE: 120 MMHG | OXYGEN SATURATION: 97 % | HEART RATE: 70 BPM

## 2022-11-01 DIAGNOSIS — J01.00 ACUTE NON-RECURRENT MAXILLARY SINUSITIS: Primary | ICD-10-CM

## 2022-11-01 DIAGNOSIS — M79.7 CHRONIC FATIGUE SYNDROME WITH FIBROMYALGIA: Primary | ICD-10-CM

## 2022-11-01 DIAGNOSIS — G93.32 CHRONIC FATIGUE SYNDROME WITH FIBROMYALGIA: Primary | ICD-10-CM

## 2022-11-01 RX ORDER — AMOXICILLIN AND CLAVULANATE POTASSIUM 875; 125 MG/1; MG/1
1 TABLET, FILM COATED ORAL EVERY 12 HOURS SCHEDULED
Qty: 14 TABLET | Refills: 0 | Status: SHIPPED | OUTPATIENT
Start: 2022-11-01 | End: 2022-11-08

## 2022-11-01 RX ORDER — FLUTICASONE PROPIONATE 50 MCG
1 SPRAY, SUSPENSION (ML) NASAL DAILY
Qty: 11.1 ML | Refills: 1 | Status: SHIPPED | OUTPATIENT
Start: 2022-11-01

## 2022-11-01 RX ORDER — METHYLPHENIDATE HYDROCHLORIDE 5 MG/1
5 TABLET ORAL
Qty: 60 TABLET | Refills: 0 | Status: SHIPPED | OUTPATIENT
Start: 2022-11-01 | End: 2022-11-30 | Stop reason: SDUPTHER

## 2022-11-01 RX ORDER — DEXTROMETHORPHAN HYDROBROMIDE AND PROMETHAZINE HYDROCHLORIDE 15; 6.25 MG/5ML; MG/5ML
5 SOLUTION ORAL 4 TIMES DAILY PRN
Qty: 118 ML | Refills: 1 | Status: SHIPPED | OUTPATIENT
Start: 2022-11-01

## 2022-11-01 NOTE — PROGRESS NOTES
Name: Dorie Corey      : 1980      MRN: [id number]  Encounter Provider: Carrie Arredondo MD  Encounter Date: 2022   Encounter department: [address] [place] [address]  Acute non-recurrent maxillary sinusitis  -     amoxicillin-clavulanate (Augmentin) 875-125 mg per tablet; Take 1 tablet by mouth every 12 (twelve) hours for 7 days  -     fluticasone (FLONASE) 50 mcg/act nasal spray; 1 spray into each nostril daily  -     loratadine-pseudoephedrine (CLARITIN-D 12-HOUR) 5-120 mg per tablet; Take 1 tablet by mouth 2 (two) times a day for 5 days  -     Promethazine-DM (PHENERGAN-DM) 6 25-15 mg/5 mL oral syrup; Take 5 mL by mouth 4 (four) times a day as needed for cough    Follow up as needed       Subjective     Patient is here because she has been having a sore throat, with associated neck discomfort and a non productive cough  No fevers  Recently re-started taking Enbrel for Psoriatic Arthritis  denies any fevers  Rapid covid negative  Review of Systems   Constitutional: Negative for activity change, appetite change, fatigue and fever  HENT: Positive for congestion, sinus pressure and sinus pain  Negative for ear discharge  Respiratory: Positive for cough  Negative for shortness of breath  Cardiovascular: Negative for chest pain and palpitations  Gastrointestinal: Negative for diarrhea and nausea  Musculoskeletal: Negative for arthralgias and back pain  Skin: Negative for color change and rash  Neurological: Negative for dizziness and headaches  Psychiatric/Behavioral: Negative for agitation and behavioral problems  Past Medical History:   Diagnosis Date   • Anxiety    • 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 )     Prothrombin Gene Mutation Factor II   • Disease of thyroid gland    • Ear problems    • Fatty liver    • GERD (gastroesophageal reflux disease)    • Hashimoto's disease    • Heart murmur    • Mitral valve prolapse    • Obesity (BMI 30-39  9)    • Prothrombin gene mutation (HCC)     Factor II   • Psoriatic arthritis ([facility] [address] )    • Sleep difficulties    • SVT (supraventricular tachycardia) (HCC)    • Varicosities of leg      Past Surgical History:   Procedure Laterality Date   • CARDIAC ELECTROPHYSIOLOGY STUDY AND ABLATION      X 2 for SVT   • CARDIAC SURGERY  2017 and 2018    cardiac ablation   • CERVICAL BIOPSY  W/ LOOP ELECTRODE EXCISION     • CHOLECYSTECTOMY     • COLONOSCOPY     • TX REMOVE TONSILS/ADENOIDS,12+ Y/O N/A 12/10/2021    Procedure: TONSILLECTOMY & ADENOIDECTOMY;  Surgeon: Yulissa Montanez MD;  [facility];  Service: ENT   • TONSILLECTOMY  2021   • WISDOM TOOTH EXTRACTION       Family History   Problem Relation Age of Onset   • Hypothyroidism Mother    • Varicose Veins Mother    • Asthma Mother    • Hypothyroidism Father    • Heart disease Father    • Heart attack Father 52   • Emphysema Father    • COPD Father         former smoker   • Heart failure Father         heart attack   • Varicose Veins Sister    • Diabetes Maternal Grandmother    • Psoriasis Paternal Grandmother    • Diabetes Paternal Grandmother    • Anemia Cousin      Social History     Socioeconomic History   • Marital status: Single     Spouse name: None   • Number of children: None   • Years of education: 2 yr college   • Highest education level: None   Occupational History   • None   Tobacco Use   • Smoking status: Former Smoker     Packs/day: 0 50     Years: 22 00     Pack years: 11 00     Types: Cigarettes     Start date:      Quit date: 2016     Years since quittin 8   • Smokeless tobacco: Never Used   Vaping Use   • Vaping Use: Never used   Substance and Sexual Activity   • Alcohol use: Not Currently     Comment: rare Socially   • Drug use: Never   • Sexual activity: Yes     Partners: Male     Birth control/protection: Abstinence, Other   Other Topics Concern   • None   Social History Narrative    · Most recent tobacco use screenin2019 · Do you currently or have you served in the [employer]:   No      · Live alone or with others:   with others      · Are you currently employed: Yes      · Alcohol intake:   Occasional      · Illicit drugs:   none      · Caffeine intake:    Moderate      · Diet:   Regular      · Exercise level:   Occasional      · Education:   2 Year College      · Guns present in home:   No      · Seat belts used routinely:   Yes      · Sexual orientation:   Heterosexual      · Sunscreen used routinely:   Yes      · General stress level:   Medium      Social Determinants of Health     Financial Resource Strain: Not on file   Food Insecurity: Not on file   Transportation Needs: Not on file   Physical Activity: Not on file   Stress: Not on file   Social Connections: Not on file   Intimate Partner Violence: Not on file   Housing Stability: Not on file     Current Outpatient Medications on File Prior to Visit   Medication Sig   • ALPRAZolam (XANAX) 0 5 mg tablet Take 0 5 mg by mouth as needed     • calcipotriene (DOVONEX) 0 005 % cream Apply 1 application topically 2 (two) times a day To affected area   • clobetasol (TEMOVATE) 0 05 % cream    • Enbrel SureClick 50 MG/ML injection    • ergocalciferol (VITAMIN D2) 50,000 units Take 50,000 Units by mouth once a week Takes on Mondays    • escitalopram (LEXAPRO) 20 mg tablet Take 20 mg by mouth daily in the early morning   • levothyroxine 25 mcg tablet Take 1 tablet (25 mcg total) by mouth daily (Patient taking differently: Take 25 mcg by mouth daily in the early morning)   • mometasone (ELOCON) 0 1 % cream 1 application daily Apply to affected area   • [DISCONTINUED] clobetasol (TEMOVATE) 0 05 % external solution    • [DISCONTINUED] methotrexate 7 5 MG tablet Take 7 5 mg by mouth once a week   • [DISCONTINUED] mometasone (ELOCON) 0 1 % cream Apply topically daily   • [DISCONTINUED] pregabalin (LYRICA) 75 mg capsule Take 75 mg by mouth 3 (three) times a day   • [DISCONTINUED] terbinafine (LamISIL) 250 mg tablet      Allergies   Allergen Reactions   • Nickel Hives, Itching and Rash     "Localized"     Immunization History   Administered Date(s) Administered   • COVID-19 MODERNA VACC 0 5 ML IM 03/30/2021, 04/28/2021   • INFLUENZA 10/15/2014, 10/06/2015, 10/06/2015, 10/08/2018   • Influenza, injectable, quadrivalent, preservative free 0 5 mL 09/09/2022   • Td (adult), adsorbed 05/27/2004   • Tdap 10/21/2014   • Tuberculin Skin Test-PPD Intradermal 07/27/2016       Objective     /74 (BP Location: Left arm, Patient Position: Sitting)   Pulse 70   Temp 97 9 °F (36 6 °C) (Temporal)   Ht 5' 5" (1 651 m)   Wt 105 kg (231 lb)   SpO2 97%   BMI 38 44 kg/m²     Physical Exam  Constitutional:       General: She is not in acute distress  Appearance: She is well-developed  She is not diaphoretic  HENT:      Head: Normocephalic and atraumatic  Nose: Nose normal    Eyes:      Conjunctiva/sclera: Conjunctivae normal       Pupils: Pupils are equal, round, and reactive to light  Cardiovascular:      Rate and Rhythm: Normal rate and regular rhythm  Heart sounds: Normal heart sounds  No murmur heard  Pulmonary:      Effort: Pulmonary effort is normal  No respiratory distress  Breath sounds: Normal breath sounds  No wheezing  Abdominal:      General: Bowel sounds are normal  There is no distension  Palpations: Abdomen is soft  Tenderness: There is no abdominal tenderness  Skin:     General: Skin is warm and dry  Findings: No erythema or rash  Neurological:      Mental Status: She is alert and oriented to person, place, and time         Mayra Delcid MD

## 2022-11-03 ENCOUNTER — TELEPHONE (OUTPATIENT)
Dept: FAMILY MEDICINE CLINIC | Facility: CLINIC | Age: 42
End: 2022-11-03

## 2022-11-03 NOTE — TELEPHONE ENCOUNTER
Her insurance denied the Methyphenidate because of the diagnosis  I can send in a discount card or is there something else

## 2022-11-03 NOTE — TELEPHONE ENCOUNTER
"Chief Complaint   Patient presents with     Fever     fever since saturday along with cough       Initial Pulse 89  Temp 100.4  F (38  C) (Tympanic)  Resp 20  Wt 38 lb 9.6 oz (17.5 kg)  SpO2 100% Estimated body mass index is 15.61 kg/(m^2) as calculated from the following:    Height as of 7/21/17: 3' 3.25\" (0.997 m).    Weight as of 7/21/17: 34 lb 3.2 oz (15.5 kg).  Medication Reconciliation: complete     Gladys Jay, Medical Assistant      " Could try discount card  Is there a prior auth?

## 2022-11-05 ENCOUNTER — APPOINTMENT (OUTPATIENT)
Dept: LAB | Facility: CLINIC | Age: 42
End: 2022-11-05

## 2022-11-05 DIAGNOSIS — Z11.1 SCREENING FOR TUBERCULOSIS: ICD-10-CM

## 2022-11-09 LAB
GAMMA INTERFERON BACKGROUND BLD IA-ACNC: 0.03 IU/ML
M TB IFN-G BLD-IMP: NEGATIVE
M TB IFN-G CD4+ BCKGRND COR BLD-ACNC: 0.03 IU/ML
M TB IFN-G CD4+ BCKGRND COR BLD-ACNC: 0.03 IU/ML
MITOGEN IGNF BCKGRD COR BLD-ACNC: >10 IU/ML

## 2022-11-23 ENCOUNTER — ANESTHESIA (OUTPATIENT)
Dept: GASTROENTEROLOGY | Facility: HOSPITAL | Age: 42
End: 2022-11-23

## 2022-11-23 ENCOUNTER — ANESTHESIA EVENT (OUTPATIENT)
Dept: GASTROENTEROLOGY | Facility: HOSPITAL | Age: 42
End: 2022-11-23

## 2022-11-23 ENCOUNTER — HOSPITAL ENCOUNTER (OUTPATIENT)
Dept: GASTROENTEROLOGY | Facility: HOSPITAL | Age: 42
Setting detail: OUTPATIENT SURGERY
Discharge: HOME | End: 2022-11-23
Attending: INTERNAL MEDICINE

## 2022-11-23 VITALS
OXYGEN SATURATION: 96 % | SYSTOLIC BLOOD PRESSURE: 115 MMHG | RESPIRATION RATE: 18 BRPM | DIASTOLIC BLOOD PRESSURE: 63 MMHG | BODY MASS INDEX: 37.49 KG/M2 | WEIGHT: 225 LBS | HEART RATE: 67 BPM | TEMPERATURE: 97.6 F | HEIGHT: 65 IN

## 2022-11-23 DIAGNOSIS — F32.0 CURRENT MILD EPISODE OF MAJOR DEPRESSIVE DISORDER, UNSPECIFIED WHETHER RECURRENT (HCC): ICD-10-CM

## 2022-11-23 DIAGNOSIS — Z00.00 ANNUAL PHYSICAL EXAM: ICD-10-CM

## 2022-11-23 DIAGNOSIS — E66.01 CLASS 3 SEVERE OBESITY DUE TO EXCESS CALORIES WITH SERIOUS COMORBIDITY IN ADULT, UNSPECIFIED BMI (HCC): ICD-10-CM

## 2022-11-23 DIAGNOSIS — R10.13 EPIGASTRIC PAIN: ICD-10-CM

## 2022-11-23 DIAGNOSIS — Z20.9 EXPOSURE TO POTENTIAL INFECTION: ICD-10-CM

## 2022-11-23 LAB
EXT PREGNANCY TEST URINE: NEGATIVE
EXT. CONTROL: NORMAL

## 2022-11-23 RX ORDER — LIDOCAINE HYDROCHLORIDE 20 MG/ML
INJECTION, SOLUTION EPIDURAL; INFILTRATION; INTRACAUDAL; PERINEURAL AS NEEDED
Status: DISCONTINUED | OUTPATIENT
Start: 2022-11-23 | End: 2022-11-23

## 2022-11-23 RX ORDER — PANTOPRAZOLE SODIUM 40 MG/1
40 TABLET, DELAYED RELEASE ORAL DAILY
Qty: 30 TABLET | Refills: 3 | Status: SHIPPED | OUTPATIENT
Start: 2022-11-23

## 2022-11-23 RX ORDER — SODIUM CHLORIDE, SODIUM LACTATE, POTASSIUM CHLORIDE, CALCIUM CHLORIDE 600; 310; 30; 20 MG/100ML; MG/100ML; MG/100ML; MG/100ML
INJECTION, SOLUTION INTRAVENOUS CONTINUOUS PRN
Status: DISCONTINUED | OUTPATIENT
Start: 2022-11-23 | End: 2022-11-23

## 2022-11-23 RX ORDER — LEVOTHYROXINE SODIUM 0.03 MG/1
25 TABLET ORAL
Qty: 30 TABLET | Refills: 3 | Status: SHIPPED | OUTPATIENT
Start: 2022-11-23

## 2022-11-23 RX ORDER — PROPOFOL 10 MG/ML
INJECTION, EMULSION INTRAVENOUS AS NEEDED
Status: DISCONTINUED | OUTPATIENT
Start: 2022-11-23 | End: 2022-11-23

## 2022-11-23 RX ADMIN — PROPOFOL 150 MG: 10 INJECTION, EMULSION INTRAVENOUS at 08:32

## 2022-11-23 RX ADMIN — LIDOCAINE HYDROCHLORIDE 100 MG: 20 INJECTION, SOLUTION EPIDURAL; INFILTRATION; INTRACAUDAL; PERINEURAL at 08:32

## 2022-11-23 RX ADMIN — PROPOFOL 50 MG: 10 INJECTION, EMULSION INTRAVENOUS at 08:35

## 2022-11-23 RX ADMIN — SODIUM CHLORIDE, SODIUM LACTATE, POTASSIUM CHLORIDE, AND CALCIUM CHLORIDE: .6; .31; .03; .02 INJECTION, SOLUTION INTRAVENOUS at 08:16

## 2022-11-23 NOTE — H&P
History and Physical - [facility]  Toyin Alvarenga 43 y o  female MRN: [id number]    HPI: Toyin Alvarenga is a 43y o  year old female who presents with epigastric pain  She has reflux and epigastric pain      Review of Systems    Historical Information   Past Medical History:   Diagnosis Date   • Anxiety    • 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[facility] )     Prothrombin Gene Mutation Factor II   • Colon polyp    • Disease of thyroid gland    • Ear problems    • Fatty liver    • GERD (gastroesophageal reflux disease)    • Hashimoto's disease    • Heart murmur    • Mitral valve prolapse    • Obesity (BMI 30-39  9)    • PONV (postoperative nausea and vomiting)    • Prothrombin gene mutation (HCC)     Factor II   • Psoriatic arthritis ([facility] )    • Sleep difficulties    • SVT (supraventricular tachycardia) (HCC)    • Varicosities of leg      Past Surgical History:   Procedure Laterality Date   • CARDIAC ELECTROPHYSIOLOGY STUDY AND ABLATION      X 2 for SVT   • CARDIAC SURGERY  2017 and     cardiac ablation   • CERVICAL BIOPSY  W/ LOOP ELECTRODE EXCISION     • CHOLECYSTECTOMY     • COLONOSCOPY     • ID REMOVE TONSILS/ADENOIDS,12+ Y/O N/A 12/10/2021    Procedure: TONSILLECTOMY & ADENOIDECTOMY;  Surgeon: Sanjiv Forbes MD;  Location: [facility];  Service: ENT   • TONSILLECTOMY  2021   • WISDOM TOOTH EXTRACTION       Social History   Social History     Substance and Sexual Activity   Alcohol Use Not Currently    Comment: rare Socially     Social History     Substance and Sexual Activity   Drug Use Never     Social History     Tobacco Use   Smoking Status Former   • Packs/day: 0 50   • Years: 22 00   • Pack years: 11 00   • Types: Cigarettes   • Start date: 12   • Quit date: 2016   • Years since quittin 8   Smokeless Tobacco Never     Family History   Problem Relation Age of Onset   • Hypothyroidism Mother    • Varicose Veins Mother    • Asthma Mother    • Hypothyroidism Father    • Heart disease Father    • Heart attack Father 52   • Emphysema Father    • COPD Father         former smoker   • Heart failure Father         heart attack   • Varicose Veins Sister    • Diabetes Maternal Grandmother    • Psoriasis Paternal Grandmother    • Diabetes Paternal Grandmother    • Anemia Cousin        Meds/Allergies     (Not in a hospital admission)      Allergies   Allergen Reactions   • Nickel Hives, Itching and Rash     "Localized"       Objective     /72   Pulse 63   Temp 97 6 °F (36 4 °C) (Temporal)   Resp 18   Ht 5' 5" (1 651 m)   Wt 102 kg (225 lb)   LMP 10/22/2022 (Exact Date)   SpO2 97%   BMI 37 44 kg/m²       PHYSICAL EXAM    Gen: NAD  CV: RRR  CHEST: Clear  ABD: soft, NT/ND  EXT: no edema  Neuro: AAO      ASSESSMENT/PLAN:  This is a 43y o  year old female here for EGD for evaluation of epigastric pain and reflux    PLAN:   Procedure:  EGD with biopsy and possible polypectomy

## 2022-11-23 NOTE — ANESTHESIA PREPROCEDURE EVALUATION
Procedure:  EGD    Relevant Problems   ENDO   (+) Hypothyroid      MUSCULOSKELETAL   (+) Fibromyalgia affecting multiple sites   (+) Psoriatic arthritis (HCC)      NEURO/PSYCH   (+) Anxiety   (+) Fibromyalgia affecting multiple sites        Physical Exam    Airway    Mallampati score: II  TM Distance: <3 FB  Neck ROM: full     Dental   No notable dental hx     Cardiovascular  Cardiovascular exam normal    Pulmonary  Pulmonary exam normal     Other Findings        Anesthesia Plan  ASA Score- 2     Anesthesia Type- IV sedation with anesthesia with ASA Monitors  Additional Monitors:   Airway Plan:           Plan Factors-Exercise tolerance (METS): >4 METS  Chart reviewed  Patient summary reviewed  Patient is not a current smoker  Induction- intravenous  Postoperative Plan-     Informed Consent- Anesthetic plan and risks discussed with patient and father  I personally reviewed this patient with the CRNA  Discussed and agreed on the Anesthesia Plan with the CRNA  Rc Encinas Prince is a 43 y o  female presenting today for EGD  Pertinent medical history includes: reflux, mild sleep apnea, no CPAP needed per patient  GERD occurs often  History of anesthesia: no issues  NPO since last night  Denies N/V, F, chills, CP, SOB  AQA  Plan for MAC

## 2022-11-23 NOTE — ANESTHESIA POSTPROCEDURE EVALUATION
Post-Op Assessment Note    CV Status:  Stable  Pain Score: 0    Pain management: adequate     Mental Status:  Arousable   Hydration Status:  Stable   PONV Controlled:  None   Airway Patency:  Patent      Post Op Vitals Reviewed: Yes      Staff: CRNA         No notable events documented      BP   123/65   Temp      Pulse  73   Resp   18   SpO2   99

## 2022-11-30 DIAGNOSIS — G93.32 CHRONIC FATIGUE SYNDROME WITH FIBROMYALGIA: ICD-10-CM

## 2022-11-30 DIAGNOSIS — M79.7 CHRONIC FATIGUE SYNDROME WITH FIBROMYALGIA: ICD-10-CM

## 2022-11-30 RX ORDER — METHYLPHENIDATE HYDROCHLORIDE 5 MG/1
5 TABLET ORAL
Qty: 60 TABLET | Refills: 0 | Status: SHIPPED | OUTPATIENT
Start: 2022-11-30

## 2022-12-19 ENCOUNTER — TELEPHONE (OUTPATIENT)
Dept: PULMONOLOGY | Facility: CLINIC | Age: 42
End: 2022-12-19

## 2022-12-19 NOTE — TELEPHONE ENCOUNTER
Spoke with pt    She refused to scheduled ordered ss and to schedule future appts    Seeking second opinion and provider elsewhere

## 2022-12-31 PROBLEM — J01.00 ACUTE NON-RECURRENT MAXILLARY SINUSITIS: Status: RESOLVED | Noted: 2022-11-01 | Resolved: 2022-12-31

## 2023-01-11 ENCOUNTER — TELEPHONE (OUTPATIENT)
Dept: PSYCHIATRY | Facility: CLINIC | Age: 43
End: 2023-01-11

## 2023-01-11 NOTE — TELEPHONE ENCOUNTER
Behavioral Health Outpatient Intake Questions    Referred By   : PCP    Please advise interviewee that they need to answer all questions truthfully to allow for best care, and any misrepresentations of information may affect their ability to be seen at this clinic   => Was this discussed? Yes     If Minor Child (under age 25)    Who is/are the legal guardian(s) of the child? Is there a custody agreement? No     • If "YES"- Custody orders must be obtained prior to scheduling the first appointment  • In addition, Consent to Treatment must be signed by all legal guardians prior to scheduling the first appointment    • If "NO"- Consent to Treatment must be signed by all legal guardians prior to scheduling the first appointment    Behavioral Health Outpatient Intake History -     Presenting Problem (in patient's own words): ANXIETY, SLEEP DISTURBANCE     Are there any communication barriers for this patient? No                                               If yes, please describe barriers:   • If there is a unique situation, please refer to [address] [place] [address] for final determination  Are you taking any psychiatric medications? Yes   •   If "YES" -What are they Lexapro, Xanax   •   If "YES" -Who prescribes?   pcp   Has the Patient previously received outpatient Talk Therapy or Medication Management from [facility]  No     •    If "YES"- When, Where and with Whom? •    If "NO" -Has Patient received these services elsewhere? •   If "YES" -When, Where, and with Whom? Has the Patient abused alcohol or other substances in the last 6 months ? No  No concerns of substance abuse are reported  •  If "YES" -What substance, How much, How often? •  If illegal substance: Refer to [employer] (for [facility]) or [facility]  •  If Alcohol in excess of 10 drinks per week:  Refer to [employer] (for [facility]) or [address] [place] [address] History-     Is this treatment court ordered?  No   • If "Yes"- refer to [address] [place] [address] for final determination  Has the Patient been convicted of a felony? No  •  If "Yes" -When, What? • Talk Therapy : Send to [address] [place] [address] for final determination   • Med Management: Send to Dr Jose Carroll for final determination     ACCEPTED as a patient Yes  • If "Yes" Appointment Date: 2/6 @  Dr Anne Moreira? No  • If “Yes” - (Where? Ex: [facility], [place], [address] [place] [address], etc )       Name of Insurance Delilah Newell 41 ID# [id number]  Insurance Phone #  If ins is primary or secondary? If patient is a minor, parents information such as Name, D  O B of guarantor

## 2023-01-11 NOTE — TELEPHONE ENCOUNTER
Contacted patient off of Medication Management  to verify needs of services in attempts to offer patient an appointment at [facility]  lvm for patient to contact intake dept in regards to scheduling

## 2023-01-13 ENCOUNTER — OFFICE VISIT (OUTPATIENT)
Dept: FAMILY MEDICINE CLINIC | Facility: CLINIC | Age: 43
End: 2023-01-13

## 2023-01-13 ENCOUNTER — TELEPHONE (OUTPATIENT)
Dept: PSYCHIATRY | Facility: CLINIC | Age: 43
End: 2023-01-13

## 2023-01-13 VITALS
BODY MASS INDEX: 40.32 KG/M2 | WEIGHT: 242 LBS | OXYGEN SATURATION: 98 % | TEMPERATURE: 97.1 F | HEART RATE: 77 BPM | SYSTOLIC BLOOD PRESSURE: 123 MMHG | HEIGHT: 65 IN | DIASTOLIC BLOOD PRESSURE: 88 MMHG

## 2023-01-13 DIAGNOSIS — E06.3 HYPOTHYROIDISM DUE TO HASHIMOTO'S THYROIDITIS: ICD-10-CM

## 2023-01-13 DIAGNOSIS — F41.9 ANXIETY: ICD-10-CM

## 2023-01-13 DIAGNOSIS — E04.9 ENLARGED THYROID: ICD-10-CM

## 2023-01-13 DIAGNOSIS — M79.7 CHRONIC FATIGUE SYNDROME WITH FIBROMYALGIA: ICD-10-CM

## 2023-01-13 DIAGNOSIS — E03.8 HYPOTHYROIDISM DUE TO HASHIMOTO'S THYROIDITIS: ICD-10-CM

## 2023-01-13 DIAGNOSIS — M79.7 FIBROMYALGIA AFFECTING MULTIPLE SITES: Primary | ICD-10-CM

## 2023-01-13 DIAGNOSIS — G93.32 CHRONIC FATIGUE SYNDROME WITH FIBROMYALGIA: ICD-10-CM

## 2023-01-13 PROBLEM — G47.19 EXCESSIVE DAYTIME SLEEPINESS: Status: ACTIVE | Noted: 2022-12-16

## 2023-01-13 PROBLEM — G25.81 RLS (RESTLESS LEGS SYNDROME): Status: ACTIVE | Noted: 2022-12-16

## 2023-01-13 RX ORDER — GABAPENTIN 100 MG/1
100 CAPSULE ORAL 3 TIMES DAILY
COMMUNITY
Start: 2022-12-29 | End: 2023-12-29

## 2023-01-13 RX ORDER — PRAMIPEXOLE DIHYDROCHLORIDE 0.25 MG/1
TABLET ORAL
COMMUNITY
Start: 2023-01-12

## 2023-01-13 NOTE — PROGRESS NOTES
Assessment/Plan:         Problem List Items Addressed This Visit        Endocrine    Hypothyroid     Update labs          Relevant Orders    TSH, 3rd generation    T4, free    US thyroid    Enlarged thyroid     Check u/S            Nervous and Auditory    Chronic fatigue syndrome with fibromyalgia     Improved on Ritalin 5 mg daily  Continue same            Other    Fibromyalgia affecting multiple sites - Primary     Continue Gabapentin, Lexapro         Anxiety     Currently on lexapro, will be seeing psych in Feb               Subjective:      Patient ID: Rossy Johnson is a 43 y o  female  She is here to f/u on chronic fatigue and fibromyalgia  She was started Ritalin (Provigil was not covered)  She takes it once a day  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  She was determined to have periodic limb movement  She is on Mirapex 0 25 mg QHS  She sees rheumatology for psoriatic arthritis - is going to start Remicade later this month  Enbrel was discontinued  Anxiety - seeing psych in February  Is on lexapro  Still feels anxious frequently  Thyroid - labs are due  On levothyroxine 25 mcg  Neck feels tight and last U/S was done a year ago  Brother recently dx/d with thyroid cancer        The following portions of the patient's history were reviewed and updated as appropriate:   Past Medical History:  She has a past medical history of Anxiety, 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[address] ), Colon polyp, Disease of thyroid gland, Ear problems, Fatty liver, GERD (gastroesophageal reflux disease), Hashimoto's disease, Heart murmur, Mitral valve prolapse, Obesity (BMI 30-39 9), PONV (postoperative nausea and vomiting), Prothrombin gene mutation ([address] ), Psoriatic arthritis ([address] ), Sleep difficulties, SVT (supraventricular tachycardia) ([address] ), and Varicosities of leg ,  _______________________________________________________________________  Medical Problems:  does not have any pertinent problems on file ,  _______________________________________________________________________  Past Surgical History:   has a past surgical history that includes Cholecystectomy; Cardiac electrophysiology study and ablation; Cervical biopsy w/ loop electrode excision (2004); [place] tooth extraction; Colonoscopy; pr tonsillectomy & adenoidectomy age 12/> (N/A, 12/10/2021); Cardiac surgery (2017 and 2018); and Tonsillectomy (9/2021)  ,  _______________________________________________________________________  Family History:  family history includes Anemia in her cousin; Asthma in her mother; COPD in her father; Diabetes in her maternal grandmother and paternal grandmother; Emphysema in her father; Heart attack (age of onset: 52) in her father; Heart disease in her father; Heart failure in her father; Hypothyroidism in her father and mother; Psoriasis in her paternal grandmother; Varicose Veins in her mother and sister  ,  _______________________________________________________________________  Social History:   reports that she quit smoking about 7 years ago  Her smoking use included cigarettes  She started smoking about 29 years ago  She has a 11 00 pack-year smoking history  She has never used smokeless tobacco  She reports that she does not currently use alcohol  She reports that she does not use drugs  ,  _______________________________________________________________________  Allergies:  is allergic to nickel     _______________________________________________________________________  Current Outpatient Medications   Medication Sig Dispense Refill   • calcipotriene (DOVONEX) 0 005 % cream Apply 1 application topically 2 (two) times a day To affected area     • clobetasol (TEMOVATE) 0 05 % cream      • escitalopram (LEXAPRO) 20 mg tablet Take 20 mg by mouth daily in the early morning     • gabapentin (NEURONTIN) 100 mg capsule Take 100 mg by mouth Three times a day     • levothyroxine 25 mcg tablet Take 1 tablet (25 mcg total) by mouth daily in the early morning 30 tablet 3   • methylphenidate (RITALIN) 5 mg tablet Take 1 tablet (5 mg total) by mouth 2 (two) times a day before breakfast and lunch Max Daily Amount: 10 mg 60 tablet 0   • mometasone (ELOCON) 0 1 % cream 1 application daily Apply to affected area     • pantoprazole (PROTONIX) 40 mg tablet Take 1 tablet (40 mg total) by mouth daily 30 tablet 3   • pramipexole (MIRAPEX) 0 25 mg tablet      • ALPRAZolam (XANAX) 0 5 mg tablet Take 0 5 mg by mouth as needed       • calcipotriene (DOVONEX) 0 005 % cream Apply 1 application topically 2 (two) times a day To affected area     • clobetasol (TEMOVATE) 0 05 % cream      • Enbrel SureClick 50 MG/ML injection      • ergocalciferol (VITAMIN D2) 50,000 units Take 50,000 Units by mouth once a week Takes on Mondays      • mometasone (ELOCON) 0 1 % cream 1 application daily Apply to affected area     • pantoprazole (PROTONIX) 40 mg tablet Take 1 tablet (40 mg total) by mouth daily 30 tablet 3     No current facility-administered medications for this visit      _______________________________________________________________________  Review of Systems   Constitutional: Positive for fatigue  HENT:        Neck swelling   Musculoskeletal: Positive for myalgias  Restless legs   Psychiatric/Behavioral: Negative for dysphoric mood  The patient is nervous/anxious  Objective:  Vitals:    01/13/23 1031   BP: 123/88   Pulse: 77   Temp: (!) 97 1 °F (36 2 °C)   SpO2: 98%   Weight: 110 kg (242 lb)   Height: 5' 5" (1 651 m)     Body mass index is 40 27 kg/m²  Physical Exam  Vitals and nursing note reviewed  Constitutional:       General: She is not in acute distress  Appearance: Normal appearance  She is obese  She is not ill-appearing, toxic-appearing or diaphoretic     HENT:      Head: Normocephalic and atraumatic  Right Ear: External ear normal       Left Ear: External ear normal    Neck:      Thyroid: Thyromegaly present  No thyroid mass or thyroid tenderness  Comments: General neck swelling  Cardiovascular:      Rate and Rhythm: Normal rate and regular rhythm  Heart sounds: No murmur heard  No friction rub  Pulmonary:      Effort: Pulmonary effort is normal  No respiratory distress  Breath sounds: Normal breath sounds  No stridor  No wheezing, rhonchi or rales  Musculoskeletal:         General: No swelling  Right lower leg: No edema  Left lower leg: No edema  Lymphadenopathy:      Cervical: No cervical adenopathy  Neurological:      General: No focal deficit present  Mental Status: She is alert  Mental status is at baseline  Psychiatric:         Attention and Perception: Attention normal          Mood and Affect: Mood normal          Speech: Speech normal          Behavior: Behavior normal          Thought Content:  Thought content normal          Judgment: Judgment normal

## 2023-01-30 DIAGNOSIS — M79.7 CHRONIC FATIGUE SYNDROME WITH FIBROMYALGIA: Primary | ICD-10-CM

## 2023-01-30 DIAGNOSIS — L40.50 PSORIATIC ARTHRITIS (HCC): ICD-10-CM

## 2023-01-30 DIAGNOSIS — G93.32 CHRONIC FATIGUE SYNDROME WITH FIBROMYALGIA: Primary | ICD-10-CM

## 2023-01-30 RX ORDER — DEXTROAMPHETAMINE SACCHARATE, AMPHETAMINE ASPARTATE MONOHYDRATE, DEXTROAMPHETAMINE SULFATE AND AMPHETAMINE SULFATE 1.25; 1.25; 1.25; 1.25 MG/1; MG/1; MG/1; MG/1
5 CAPSULE, EXTENDED RELEASE ORAL EVERY MORNING
Qty: 30 CAPSULE | Refills: 0 | Status: SHIPPED | OUTPATIENT
Start: 2023-01-30 | End: 2023-02-22 | Stop reason: DRUGHIGH

## 2023-01-31 ENCOUNTER — DOCUMENTATION (OUTPATIENT)
Dept: BEHAVIORAL/MENTAL HEALTH CLINIC | Facility: CLINIC | Age: 43
End: 2023-01-31

## 2023-02-03 LAB — HBA1C MFR BLD HPLC: 5.5 %

## 2023-02-10 DIAGNOSIS — F41.9 ANXIETY: Primary | ICD-10-CM

## 2023-02-10 RX ORDER — ALPRAZOLAM 0.5 MG/1
0.5 TABLET ORAL DAILY PRN
Qty: 60 TABLET | Refills: 0 | Status: SHIPPED | OUTPATIENT
Start: 2023-02-10 | End: 2023-06-09 | Stop reason: SDUPTHER

## 2023-02-13 ENCOUNTER — HOSPITAL ENCOUNTER (OUTPATIENT)
Dept: RADIOLOGY | Facility: MEDICAL CENTER | Age: 43
Discharge: HOME | End: 2023-02-13

## 2023-02-13 DIAGNOSIS — E03.8 HYPOTHYROIDISM DUE TO HASHIMOTO'S THYROIDITIS: ICD-10-CM

## 2023-02-13 DIAGNOSIS — E06.3 HYPOTHYROIDISM DUE TO HASHIMOTO'S THYROIDITIS: ICD-10-CM

## 2023-02-17 ENCOUNTER — DOCUMENTATION (OUTPATIENT)
Dept: PSYCHIATRY | Facility: CLINIC | Age: 43
End: 2023-02-17

## 2023-02-17 ENCOUNTER — OFFICE VISIT (OUTPATIENT)
Dept: PSYCHIATRY | Facility: CLINIC | Age: 43
End: 2023-02-17

## 2023-02-17 VITALS — HEART RATE: 92 BPM | SYSTOLIC BLOOD PRESSURE: 150 MMHG | DIASTOLIC BLOOD PRESSURE: 95 MMHG

## 2023-02-17 DIAGNOSIS — F90.1 ADHD, PREDOMINANTLY HYPERACTIVE TYPE: Primary | ICD-10-CM

## 2023-02-17 DIAGNOSIS — F41.9 ANXIETY: ICD-10-CM

## 2023-02-17 RX ORDER — CLONIDINE HYDROCHLORIDE 0.1 MG/1
TABLET ORAL
Qty: 60 TABLET | Refills: 0 | Status: SHIPPED | OUTPATIENT
Start: 2023-02-17

## 2023-02-17 NOTE — PSYCH
Outpatient Psychiatry Intake Exam       PCP: Parvin Peoples MD    Referral source: Dr Zeny Rosen    Identifying information:  Dillon Rebollar is a 43 y o  female with a history of ADHD, anxiety here for evaluation and determination of mental health management needs  Sources of information:   Information for this evaluation was gathered through direct interview with the patient  Additionally EMR was reviewed  Confidentiality discussion: Limits of confidentiality in cases of safety concerns involving self and others as well as this physician's role as a mandatory  of abuse  They voiced understanding and a desire to continue with the evaluation  SUBJECTIVE     Chief Complaint / reason for visit: " I've had a lot of health issues recently and it's caused me to have increased anxiety and is impacting my sleep, making me very fatigued  "    History of Present Illness:    Douglas Penny is a 70-year-old engaged female with a history of anxiety and ADHD  She lives with her fiancé and her 10year-old daughter  She is here for a psychiatric evaluation due to an increase in anxiety, daytime fatigue, decreased sleep, decreased concentration and focus, difficulty sitting still  She states that recently she has had a lot of health issues including a diagnosis of psoriatic arthritis that has caused her to have an increase in all of her psychiatric symptoms  She appears anxious in conversation, is restless and fidgety  She states that she had been diagnosed with ADHD as a child, was hyper and had trouble focusing and listening to her teacher  She reports that depression is not as much as a concern and rates her depression at 1-2/10 currently  She states her anxiety is currently rated 7-8/10  She denies suicidal and homicidal ideation, denies any past or current history of auditory or visual hallucinations  She denies any symptoms of OCD  She denies any symptoms of PTSD    She does not endorse symptoms of angela, no indiscretions, no decreased need for sleep  She reports that she does get irritable at times  She reports that she has had panic attacks in the past, and gets them approximately once every 2 to 3 months  Symptoms of her panic attacks include racing heart rate, irritability, feelings as though she cannot control what is happening  She reports that she has been on Wellbutrin, Prozac, Zoloft in the past   She is currently on Lexapro, gabapentin and, Adderall, and has Xanax as needed  She states that she uses Xanax 1-2 times per week as needed for her anxiety  She reports that she plans on having weight loss surgery in   Onset of symptoms was last year ago with gradually worsening course since that time  Stressors: health concerns    Current Rating Scores:     Current PHQ-9   PHQ-2/9 Depression Screening    Little interest or pleasure in doing things: 2 - more than half the days  Feeling down, depressed, or hopeless: 0 - not at all  Trouble falling or staying asleep, or sleeping too much: 1 - several days  Feeling tired or having little energy: 3 - nearly every day  Poor appetite or overeatin - more than half the days  Feeling bad about yourself - or that you are a failure or have let yourself or your family down: 2 - more than half the days  Trouble concentrating on things, such as reading the newspaper or watching television: 1 - several days  Moving or speaking so slowly that other people could have noticed  Or the opposite - being so fidgety or restless that you have been moving around a lot more than usual: 0 - not at all  Thoughts that you would be better off dead, or of hurting yourself in some way: 0 - not at all  PHQ-9 Score: 11   PHQ-9 Interpretation: Moderate depression        Current MIKE-7 is   MIKE-7 Flowsheet Screening    Flowsheet Row Most Recent Value   Over the last 2 weeks, how often have you been bothered by any of the following problems?     Feeling nervous, anxious, or on edge 2 Not being able to stop or control worrying 2   Worrying too much about different things 2   Trouble relaxing 3   Being so restless that it is hard to sit still 3   Becoming easily annoyed or irritable 2   Feeling afraid as if something awful might happen 1   MIKE-7 Total Score 15          HPI ROS:  Medication Side Effects: denies  Depression: 1-2 /10 (10 worst)  Anxiety: 7-8 /10 (10 worst)  Safety (SI, HI, other): denies  Sleep: always fatigued  Energy: poor  Appetite: good  Weight Change: planning to have weight loss surgery in June      In terms of depression, the patient endorses depressed mood, at times  In terms of bipolar disorder, the patient endorses no symptoms  MIKE symptoms: excessive worry more days than not for longer than 3 months, difficulty concentrating, fatigue, irritable, restlessness/keyed up and muscle tightness  Panic Disorder symptoms: palpitations/racing heart, shortness of breath, fear of losing control, irritability  Social Anxiety symptoms: no symptoms suggestive of social anxiety  OCD Symptoms: No significant symptoms supportive of OCD  Eating Disorder symptoms: no historical or current eating disorder  no binge eating disorder; no anorexia nervosa  no symptoms of bulimia    In terms of PTSD, the patient endorses exposure to trauma involving: denies; intrusive symptoms including (1+): no intrusive symptoms; avoidance symptoms including (1+): no avoidance symptoms; Negative alterations including (2+): no negative alteration symptoms; hyperarousal symptoms including (2+): no arousal symptoms       In terms of psychotic symptoms, the patient reports no psychotic symptoms now or in the past     Past Psychiatric History  Previous diagnoses include ADHD, Anxiety    Prior outpatient psychiatric treatment: PCP    Prior therapy: in the past, unable to recall where    Prior inpatient psychiatric treatment: denies    Prior suicide attempts: denies    Prior self harm: denies    Prior violence or aggression: denies    Social History:    The patient grew up in [place]  Childhood was described as "ok"  During childhood, parents were   They have one sister(s) and one brother(s)  Patient is middle in birth order    Abuse/neglect: none    As far as the patient (or present family member) is aware, overall childhood development: Patient does ascribe to normal developmental milestones such as walking, talking, potty training and making childhood friends  Education level: LPN   Current occupation: Agency nursing  Marital status: engaged  Children: one 10year old daughter  Current Living Situation: the patient currently lives in a home with her fiance and daughter   Social support: wolfgang    Spiritism Affiliation:n/a   experience: n/a  Legal history: n/a  Access to Guns: n/a    Substance use and treatment:  Tobacco use: n/a  Caffeine Use: 2 cups coffee/day  ETOH use: denies  Other substance use: denies      Endorses previous experimentation with: cannibis in high school    Longest clean time: not applicable  History of Inpatient/Outpatient rehabilitation program: no      Traumatic History:     Abuse: none  Other Traumatic Events: none     Family Psychiatric History:     Psychiatric Illness:  believes there is mental health in her family, but no one was diagnosed with anything  Substance Abuse:  patient denies  Suicide Attempts:  Dad's grandfather completed suicide      Family History   Problem Relation Age of Onset   • Hypothyroidism Mother    • Varicose Veins Mother    • Asthma Mother    • Hypothyroidism Father    • Heart disease Father    • Heart attack Father 52   • Emphysema Father    • COPD Father         former smoker   • Heart failure Father         heart attack   • Varicose Veins Sister    • Diabetes Maternal Grandmother    • Psoriasis Paternal Grandmother    • Diabetes Paternal Grandmother    • Anemia Cousin             Past Medical / Surgical History:    Current PCP: Eduardo Flores, MD   Other providers include: medical provider as needed    Patient Active Problem List   Diagnosis   • Hypothyroid   • Hashimoto's disease   • Fibromyalgia affecting multiple sites   • Psoriasis   • Psoriatic arthritis ([facility] [address] )   • Anxiety   • Sleep disturbance   • Chronic fatigue syndrome with fibromyalgia   • RLS (restless legs syndrome)   • Excessive daytime sleepiness   • Enlarged thyroid       Past Medical History-  Past Medical History:   Diagnosis Date   • ADHD (attention deficit hyperactivity disorder)    • Anxiety    • 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   • Colon polyp    • Depression    • Disease of thyroid gland    • Ear problems    • Fatty liver    • GERD (gastroesophageal reflux disease)    • Hashimoto's disease    • Heart murmur    • Mitral valve prolapse    • Obesity (BMI 30-39  9)    • Panic attack    • PONV (postoperative nausea and vomiting)    • Prothrombin gene mutation (HCC)     Factor II   • Psoriatic arthritis ([facility] [address] )    • Sleep difficulties    • SVT (supraventricular tachycardia) (HCC)    • Varicosities of leg         History of Seizures: no  History of Head injury-LOC-Concussion: no    Past Surgical History-  Past Surgical History:   Procedure Laterality Date   • CARDIAC ELECTROPHYSIOLOGY STUDY AND ABLATION      X 2 for SVT   • CARDIAC SURGERY  2017 and 2018    cardiac ablation   • CERVICAL BIOPSY  W/ LOOP ELECTRODE EXCISION  2004   • CHOLECYSTECTOMY     • COLONOSCOPY     • CO TONSILLECTOMY & ADENOIDECTOMY AGE 12/> N/A 12/10/2021    Procedure: TONSILLECTOMY & ADENOIDECTOMY;  Surgeon: Carolann Dahl MD;  Location: [facility];  Service: ENT   • TONSILLECTOMY  9/2021   • WISDOM TOOTH EXTRACTION            Allergies:    Allergies   Allergen Reactions   • Nickel Hives, Itching and Rash     "Localized"       Recent labs:  Orders Only on 02/03/2023   Component Date Value   • Hemoglobin A1C 02/03/2023 5 5      Labs were reviewed    Medical Review Of Systems:    Patient admits to psoriatic arthritis, thyroid disease, and fibromyalgia; otherwise A comprehensive review of systems was negative  Medications:  Current Outpatient Medications on File Prior to Visit   Medication Sig Dispense Refill   • ALPRAZolam (XANAX) 0 5 mg tablet Take 1 tablet (0 5 mg total) by mouth daily as needed for anxiety 60 tablet 0   • amphetamine-dextroamphetamine (ADDERALL XR, 5MG,) 5 MG 24 hr capsule Take 1 capsule (5 mg total) by mouth every morning Max Daily Amount: 5 mg 30 capsule 0   • Enbrel SureClick 50 MG/ML injection once a week     • ergocalciferol (VITAMIN D2) 50,000 units Take 50,000 Units by mouth once a week Takes on Mondays      • escitalopram (LEXAPRO) 20 mg tablet Take 20 mg by mouth daily in the early morning     • gabapentin (NEURONTIN) 100 mg capsule Take 100 mg by mouth Three times a day     • levothyroxine 25 mcg tablet Take 1 tablet (25 mcg total) by mouth daily in the early morning 30 tablet 3   • pramipexole (MIRAPEX) 0 25 mg tablet        No current facility-administered medications on file prior to visit  Medication Compliant? yes    All current active medications have been reviewed      Objective     OBJECTIVE     /95   Pulse 92     MENTAL STATUS EXAM  Appearance:  age appropriate, dressed casually   Behavior:  pleasant, cooperative, with good eye contact   Speech:  loud, hyperverbal but not pressured   Mood:  anxious   Affect:  mood congruent   Language: intact and appropriate for age, education, and intellect   Thought Process:  circumstantial   Associations: circumstantial associations   Thought Content:  normal and appropriate   Perceptual Disturbances: no auditory or visual hallcunations   Risk Potential / Abnormal Thoughts: Suicidal ideation - None  Homicidal ideation - None  Potential for aggression - No       Consciousness:  Alert & Awake   Sensorium:  Grossly oriented   Attention: attention span and concentration appear shorter than expected for age   Intellect: within normal limits   Fund of Knowledge:  Memory: awareness of current events: normal  past history: normal  vocabulary: normal  recent and remote memory grossly intact   Insight:  fair   Judgment: fair   Muscle Strength Muscle Tone: normal  normal   Gait/Station: normal gait/station with good balance   Motor Activity: no abnormal movements     Pain none   Pain Scale 0     IMPRESSIONS/FORMULATION          Diagnoses and all orders for this visit:    ADHD, predominantly hyperactive type  -     cloNIDine (Catapres) 0 1 mg tablet; Take one tablet as needed daily for onset of anxiety attack  Take one tablet as needed at bedtime for sleep  Hold for BP <90/60 or for HR <60  Anxiety      1  ADHD, predominantly hyperactive type    2  Anxiety          Camron Lino is a 43 y o  female who presents with symptoms supporting the aforementioned  Suicide / Homicide / Safety risk assessment: At this time Luke Ybarra is at low risk for harm of self or others  Plan:       Education about diagnosis and treatment modalities, patient voiced understanding and agreement with the following plan:    Discussed medication risks, beneftis, alternatives  Patient was informed and had time to ask questions  They agreed to treatment below, Risks, benefits, and possible side effects of medications explained to patient and patient verbalizes understanding, Risks of medications explained if female patient  Patient verbalizes understanding and agrees to notify her doctor if she becomes pregnant  and Patient understands risks related to pregnancy and breastfeeding  PARQ regarding medications and treatment discussed and patient agreed to treatment below       Controlled Medication Discussion:     Luke Ybarra has been filling controlled prescriptions on time as prescribed according to [facility] program      Initial treatment plan:   1) MEDS:    - Continue lexapro   - Continue gabapentin 100mg PO TID   - Initiate clonidine 0 1mg PO daily for onset of panic attack, and one table at [facility] for sleep   - She will forward her EKG to the office for review prior to increasing any of her adderall  If EKG is WNL, will increase adderall XR to 10mg PO daily   - Discussed with patient the need find a medication to replace the xanax that will be more appropriate for long-term use  Discussed risks of benzodiazepines, and the decreased effect it has when combined with the stimulant  She understands that this provider will continue to try find effective substitutes and will not prescribe the xanax at this time  2) Labs: reviewed - she will send most recent EKG  3) Therapy:    - Referral made    4) Medical:    - Pt will f/u with other providers as needed    5) Other: Support as needed     6) Follow up:   - Follow up in one month    - Patient will call if issues or concerns     7) Treatment Plan:    - Will enact at next appointment due to the time constraints of this appointment today  Discussed self monitoring of symptoms, and symptom monitoring tools  Patient has been informed of 24 hours and weekend coverage for urgent situations accessed by calling the main clinic phone number        This note was not shared with the patient due to this is a psychotherapy note

## 2023-02-20 ENCOUNTER — TELEPHONE (OUTPATIENT)
Dept: PSYCHIATRY | Facility: CLINIC | Age: 43
End: 2023-02-20

## 2023-02-20 ENCOUNTER — DOCUMENTATION (OUTPATIENT)
Dept: BEHAVIORAL/MENTAL HEALTH CLINIC | Facility: CLINIC | Age: 43
End: 2023-02-20

## 2023-02-22 ENCOUNTER — DOCUMENTATION (OUTPATIENT)
Dept: PSYCHIATRY | Facility: CLINIC | Age: 43
End: 2023-02-22

## 2023-02-22 DIAGNOSIS — F90.1 ADHD, PREDOMINANTLY HYPERACTIVE TYPE: Primary | ICD-10-CM

## 2023-02-22 RX ORDER — DEXTROAMPHETAMINE SACCHARATE, AMPHETAMINE ASPARTATE MONOHYDRATE, DEXTROAMPHETAMINE SULFATE AND AMPHETAMINE SULFATE 2.5; 2.5; 2.5; 2.5 MG/1; MG/1; MG/1; MG/1
10 CAPSULE, EXTENDED RELEASE ORAL EVERY MORNING
Qty: 30 CAPSULE | Refills: 0 | Status: SHIPPED | OUTPATIENT
Start: 2023-02-27

## 2023-02-22 NOTE — PROGRESS NOTES
Received EKG from patient, which shows normal sinus  Will increase patient's adderall to 10mg PO XR at this time  Patient was called and provided the information

## 2023-02-24 ENCOUNTER — DOCUMENTATION (OUTPATIENT)
Dept: PSYCHIATRY | Facility: CLINIC | Age: 43
End: 2023-02-24

## 2023-03-01 ENCOUNTER — TELEPHONE (OUTPATIENT)
Dept: PSYCHIATRY | Facility: CLINIC | Age: 43
End: 2023-03-01

## 2023-03-02 NOTE — TELEPHONE ENCOUNTER
Prior Authorization for Adderall XR 10 MG capsule faxed to [facility] via [employer]  Decision pending

## 2023-03-03 NOTE — TELEPHONE ENCOUNTER
Fax from [facility] with denial for Amphetamine-Dextroamphetamine 10 MG ER capsule  Requesting symptoms to show that diagnosis of ADHD was made using rules from the DSM-5, documentation showing that doctor assessed possible risks for misuse, abuse, or addiction, proof education was provided regarding possible side effects and risks with taking this type of drug, and proof that PDMP was checked  Office note faxed to [phone] [place] pending

## 2023-03-06 NOTE — TELEPHONE ENCOUNTER
Fax from [email or web address] with Prior Authorization approval for Amphetamine-Dextroamphetamine 10 MG ER capsules effective 3/4/23 - 3/4/24  Called pharmacy and informed them of approval   They advised me that medication has already been picked up

## 2023-03-10 ENCOUNTER — OFFICE VISIT (OUTPATIENT)
Dept: FAMILY MEDICINE CLINIC | Facility: CLINIC | Age: 43
End: 2023-03-10

## 2023-03-10 VITALS
WEIGHT: 239 LBS | BODY MASS INDEX: 39.82 KG/M2 | TEMPERATURE: 98.5 F | SYSTOLIC BLOOD PRESSURE: 124 MMHG | HEART RATE: 84 BPM | DIASTOLIC BLOOD PRESSURE: 82 MMHG | HEIGHT: 65 IN | OXYGEN SATURATION: 96 %

## 2023-03-10 DIAGNOSIS — R06.2 WHEEZING: ICD-10-CM

## 2023-03-10 DIAGNOSIS — J06.9 UPPER RESPIRATORY TRACT INFECTION, UNSPECIFIED TYPE: Primary | ICD-10-CM

## 2023-03-10 RX ORDER — FLUTICASONE PROPIONATE 50 MCG
2 SPRAY, SUSPENSION (ML) NASAL DAILY
COMMUNITY
Start: 2023-03-06 | End: 2023-03-20

## 2023-03-10 RX ORDER — ALBUTEROL SULFATE 90 UG/1
2 AEROSOL, METERED RESPIRATORY (INHALATION) EVERY 6 HOURS PRN
Qty: 18 G | Refills: 5 | Status: SHIPPED | OUTPATIENT
Start: 2023-03-10

## 2023-03-10 RX ORDER — PREDNISONE 20 MG/1
20 TABLET ORAL DAILY
Qty: 5 TABLET | Refills: 0 | Status: SHIPPED | OUTPATIENT
Start: 2023-03-10 | End: 2023-03-15

## 2023-03-10 RX ORDER — DEXTROMETHORPHAN HYDROBROMIDE AND PROMETHAZINE HYDROCHLORIDE 15; 6.25 MG/5ML; MG/5ML
5 SYRUP ORAL 4 TIMES DAILY PRN
COMMUNITY
Start: 2023-03-06 | End: 2023-03-13

## 2023-03-10 NOTE — PROGRESS NOTES
Assessment/Plan:         Problem List Items Addressed This Visit        Other    Wheezing    Relevant Medications    predniSONE 20 mg tablet    albuterol (Ventolin HFA) 90 mcg/act inhaler   Other Visit Diagnoses     Upper respiratory tract infection, unspecified type    -  Primary    Relevant Medications    predniSONE 20 mg tablet    albuterol (Ventolin HFA) 90 mcg/act inhaler        This is a viral upper respiratory infection  The expected course is 7-10 days  We reviewed OTC medications that are recommended to treat the symptoms  Tylenol or Motrin can be used as needed for pain or fevers  Prednisone course and albuterol prn  Call if not improving    Subjective:      Patient ID: Rosmery Amor is a 43 y o  female  Started last week with cough, wheezing  Was seen at urgent care on 3/6/23 and they gave flonase and cough medication  Has not helped  The following portions of the patient's history were reviewed and updated as appropriate:   Past Medical History:  She has a past medical history of ADHD (attention deficit hyperactivity disorder), Anxiety, 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[facility] [address] ), Colon polyp, Depression, Disease of thyroid gland, Ear problems, Fatty liver, GERD (gastroesophageal reflux disease), Hashimoto's disease, Heart murmur, Mitral valve prolapse, Obesity (BMI 30-39 9), Panic attack, PONV (postoperative nausea and vomiting), Prothrombin gene mutation ([facility] [address] ), Psoriatic arthritis ([facility] [address] ), Sleep difficulties, SVT (supraventricular tachycardia) ([facility] ), and Varicosities of leg ,  _______________________________________________________________________  Medical Problems:  does not have any pertinent problems on file ,  _______________________________________________________________________  Past Surgical History:   has a past surgical history that includes Cholecystectomy; Cardiac electrophysiology study and ablation; Cervical biopsy w/ loop electrode excision (2004);  [place] tooth extraction; Colonoscopy; pr tonsillectomy & adenoidectomy age 12/> (N/A, 12/10/2021); Cardiac surgery (2017 and 2018); and Tonsillectomy (9/2021)  ,  _______________________________________________________________________  Family History:  family history includes Anemia in her cousin; Asthma in her mother; COPD in her father; Diabetes in her maternal grandmother and paternal grandmother; Emphysema in her father; Heart attack (age of onset: 52) in her father; Heart disease in her father; Heart failure in her father; Hypothyroidism in her father and mother; Psoriasis in her paternal grandmother; Varicose Veins in her mother and sister  ,  _______________________________________________________________________  Social History:   reports that she quit smoking about 7 years ago  Her smoking use included cigarettes  She started smoking about 29 years ago  She has a 11 00 pack-year smoking history  She has never used smokeless tobacco  She reports that she does not currently use alcohol  She reports that she does not use drugs  ,  _______________________________________________________________________  Allergies:  is allergic to nickel     _______________________________________________________________________  Current Outpatient Medications   Medication Sig Dispense Refill   • albuterol (Ventolin HFA) 90 mcg/act inhaler Inhale 2 puffs every 6 (six) hours as needed for wheezing 18 g 5   • fluticasone (FLONASE) 50 mcg/act nasal spray 2 sprays into each nostril daily     • predniSONE 20 mg tablet Take 1 tablet (20 mg total) by mouth daily for 5 days 5 tablet 0   • promethazine-dextromethorphan (PHENERGAN-DM) 6 25-15 mg/5 mL oral syrup Take 5 mL by mouth 4 (four) times a day as needed     • ALPRAZolam (XANAX) 0 5 mg tablet Take 1 tablet (0 5 mg total) by mouth daily as needed for anxiety 60 tablet 0   • amphetamine-dextroamphetamine (ADDERALL XR, 10MG,) 10 MG 24 hr capsule Take 1 capsule (10 mg total) by mouth every morning Max Daily Amount: 10 mg Do not start before February 27, 2023  30 capsule 0   • cloNIDine (Catapres) 0 1 mg tablet Take one tablet as needed daily for onset of anxiety attack  Take one tablet as needed at bedtime for sleep  Hold for BP <90/60 or for HR <60  (Patient not taking: Reported on 3/10/2023) 60 tablet 0   • Enbrel SureClick 50 MG/ML injection once a week     • ergocalciferol (VITAMIN D2) 50,000 units Take 50,000 Units by mouth once a week Takes on Mondays      • escitalopram (LEXAPRO) 20 mg tablet Take 20 mg by mouth daily in the early morning     • gabapentin (NEURONTIN) 100 mg capsule Take 100 mg by mouth Three times a day     • levothyroxine 25 mcg tablet Take 1 tablet (25 mcg total) by mouth daily in the early morning 30 tablet 3     No current facility-administered medications for this visit      _______________________________________________________________________  Review of Systems   Constitutional: Negative for chills and fever  HENT: Negative for congestion, ear pain and sore throat  Respiratory: Positive for cough and wheezing  Negative for shortness of breath  Objective:  Vitals:    03/10/23 1142   BP: 124/82   Pulse: 84   Temp: 98 5 °F (36 9 °C)   SpO2: 96%   Weight: 108 kg (239 lb)   Height: 5' 5" (1 651 m)     Body mass index is 39 77 kg/m²  Physical Exam  Vitals and nursing note reviewed  Constitutional:       General: She is not in acute distress  Appearance: She is well-developed  HENT:      Right Ear: Hearing, tympanic membrane, ear canal and external ear normal       Left Ear: Hearing, tympanic membrane, ear canal and external ear normal       Nose: Nose normal       Mouth/Throat:      Pharynx: Uvula midline  No oropharyngeal exudate or posterior oropharyngeal erythema  Eyes:      Conjunctiva/sclera: Conjunctivae normal       Pupils: Pupils are equal, round, and reactive to light  Cardiovascular:      Rate and Rhythm: Normal rate  Heart sounds: Normal heart sounds   No murmur heard     No friction rub  No gallop  Pulmonary:      Effort: Pulmonary effort is normal  No respiratory distress  Breath sounds: Wheezing (with forced cough) present  No rales  Lymphadenopathy:      Cervical: No cervical adenopathy  Skin:     General: Skin is warm  Findings: No rash  Neurological:      Mental Status: She is alert and oriented to person, place, and time  Psychiatric:         Behavior: Behavior normal          Thought Content:  Thought content normal          Judgment: Judgment normal

## 2023-03-13 ENCOUNTER — OFFICE VISIT (OUTPATIENT)
Dept: PSYCHIATRY | Facility: CLINIC | Age: 43
End: 2023-03-13

## 2023-03-13 VITALS — SYSTOLIC BLOOD PRESSURE: 117 MMHG | HEART RATE: 70 BPM | DIASTOLIC BLOOD PRESSURE: 74 MMHG

## 2023-03-13 DIAGNOSIS — F41.9 ANXIETY: ICD-10-CM

## 2023-03-13 DIAGNOSIS — F90.2 ADHD (ATTENTION DEFICIT HYPERACTIVITY DISORDER), COMBINED TYPE: Primary | ICD-10-CM

## 2023-03-13 NOTE — PSYCH
Regular Visit    Problem List Items Addressed This Visit        Other    Anxiety    ADHD (attention deficit hyperactivity disorder), combined type - Primary          Encounter provider SCOT Coats    Provider located at    [address] [facility] [address]  [address] [address] [phone]    Recent Visits  Date Type Provider Dept   03/10/23 Office Visit Eliza Alfredo MD  [place]    Showing recent visits within past 7 days and meeting all other requirements  Today's Visits  Date Type Provider Dept   03/13/23 Office Visit Beverly Cook, [facility] today's visits and meeting all other requirements  Future Appointments  No visits were found meeting these conditions    Showing future appointments within next 150 days and meeting all other requirements       HPI     Current Outpatient Medications   Medication Sig Dispense Refill   • albuterol (Ventolin HFA) 90 mcg/act inhaler Inhale 2 puffs every 6 (six) hours as needed for wheezing 18 g 5   • ALPRAZolam (XANAX) 0 5 mg tablet Take 1 tablet (0 5 mg total) by mouth daily as needed for anxiety 60 tablet 0   • amphetamine-dextroamphetamine (ADDERALL XR, 10MG,) 10 MG 24 hr capsule Take 1 capsule (10 mg total) by mouth every morning Max Daily Amount: 10 mg Do not start before February 27, 2023  30 capsule 0   • Enbrel SureClick 50 MG/ML injection once a week     • ergocalciferol (VITAMIN D2) 50,000 units Take 50,000 Units by mouth once a week Takes on Mondays      • escitalopram (LEXAPRO) 20 mg tablet Take 20 mg by mouth daily in the early morning     • fluticasone (FLONASE) 50 mcg/act nasal spray 2 sprays into each nostril daily     • gabapentin (NEURONTIN) 100 mg capsule Take 600 mg by mouth daily at bedtime     • levothyroxine 25 mcg tablet Take 1 tablet (25 mcg total) by mouth daily in the early morning 30 tablet 3   • predniSONE 20 mg tablet Take 1 tablet (20 mg total) by mouth daily for 5 days 5 tablet 0   • promethazine-dextromethorphan (PHENERGAN-DM) 6 25-15 mg/5 mL oral syrup Take 5 mL by mouth 4 (four) times a day as needed       No current facility-administered medications for this visit  Review of Systems    I spent 30 minutes directly with the patient during this visit      [address] [place] [address]    Name and Date of Birth:  Jackelin Licea 43 y o  1980 MRN: [id number]    Date of Visit: March 13, 2023    Allergies   Allergen Reactions   • Nickel Hives, Itching and Rash     "Localized"       Visit Time    Visit Start Time: 2462  Visit Stop Time: 1100  Total Visit Duration: 30 minutes    SUBJECTIVE:    Raquel Morrow is seen today for a follow up for anxiety and ADHD  She continues to do well since the last visit  Raquel Morrow seen in the office today for medication management follow-up  She was last seen by this provider on 2/17/23  She states that she has not been taking the clonidine because she did not see any help with sleep or anxiety  However, her sleep doctor did change her gabapentin to 600 mg at night and that has helped her sleep improved  She reports her depression is good and her anxiety is decreased  She states the fatigue during the day is a little better as well and it helps that she is getting better quality sleep  Her focus and concentration is better and her naps have decreased during the day  Work is stressful, but manageable  She denies any suicidal or homicidal ideation, denies any auditory or visual hallucinations  She is more calm, less pressured in conversation  She does not endorse symptoms of angela or psychosis at this time  We will follow up in 1 month  We will continue all medications as ordered, consider BuSpar if anxiety continues  Reports she is looking forward to going to [facility] with her daughter in April      She denies any side effects from current psychiatric medications  PLAN:  Continue Xanax 0 5 mg p o  daily as needed for anxiety  Encouraged to only take half of a tablet for her anxiety to see if that is effective  Continue Lexapro 20 mg p o  daily  Continue Adderall XR 10 mg p o  daily  Will consider BuSpar for anxiety at future appointments if anxiety is not controlled with current medications  She is prescribed gabapentin 600 mg p o  at bedtime by her sleep doctor  She will follow up with this provider in 1 month  Referral for therapy was made at last appointment  She will call prior to scheduled appointment if concerns or issues arise    Aware of 24 hour and weekend coverage for urgent situations accessed by calling [facility] main practice number  Referral for individual psychotherapy  Medication management every 1 month  Aware of need to follow up with family physician for medical issues    Diagnoses and all orders for this visit:    ADHD (attention deficit hyperactivity disorder), combined type    Anxiety      Current Rating Scores:     Current PHQ-9   PHQ-2/9 Depression Screening    Little interest or pleasure in doing things: 1 - several days  Feeling down, depressed, or hopeless: 0 - not at all  Trouble falling or staying asleep, or sleeping too much: 2 - more than half the days  Feeling tired or having little energy: 2 - more than half the days  Poor appetite or overeatin - not at all  Feeling bad about yourself - or that you are a failure or have let yourself or your family down: 0 - not at all  Trouble concentrating on things, such as reading the newspaper or watching television: 1 - several days  Moving or speaking so slowly that other people could have noticed   Or the opposite - being so fidgety or restless that you have been moving around a lot more than usual: 0 - not at all  Thoughts that you would be better off dead, or of hurting yourself in some way: 0 - not at all  PHQ-9 Score: 6   PHQ-9 Interpretation: Mild depression        Current MIKE-7 is   MIKE-7 Flowsheet Screening    Flowsheet Row Most Recent Value   Over the last 2 weeks, how often have you been bothered by any of the following problems? Feeling nervous, anxious, or on edge 1   Not being able to stop or control worrying 2   Worrying too much about different things 1   Trouble relaxing 3   Being so restless that it is hard to sit still 3   Becoming easily annoyed or irritable 1   Feeling afraid as if something awful might happen 1   MIKE-7 Total Score 12        Current Outpatient Medications on File Prior to Visit   Medication Sig Dispense Refill   • albuterol (Ventolin HFA) 90 mcg/act inhaler Inhale 2 puffs every 6 (six) hours as needed for wheezing 18 g 5   • ALPRAZolam (XANAX) 0 5 mg tablet Take 1 tablet (0 5 mg total) by mouth daily as needed for anxiety 60 tablet 0   • amphetamine-dextroamphetamine (ADDERALL XR, 10MG,) 10 MG 24 hr capsule Take 1 capsule (10 mg total) by mouth every morning Max Daily Amount: 10 mg Do not start before February 27, 2023  30 capsule 0   • Enbrel SureClick 50 MG/ML injection once a week     • ergocalciferol (VITAMIN D2) 50,000 units Take 50,000 Units by mouth once a week Takes on Mondays      • escitalopram (LEXAPRO) 20 mg tablet Take 20 mg by mouth daily in the early morning     • fluticasone (FLONASE) 50 mcg/act nasal spray 2 sprays into each nostril daily     • gabapentin (NEURONTIN) 100 mg capsule Take 600 mg by mouth daily at bedtime     • levothyroxine 25 mcg tablet Take 1 tablet (25 mcg total) by mouth daily in the early morning 30 tablet 3   • predniSONE 20 mg tablet Take 1 tablet (20 mg total) by mouth daily for 5 days 5 tablet 0   • promethazine-dextromethorphan (PHENERGAN-DM) 6 25-15 mg/5 mL oral syrup Take 5 mL by mouth 4 (four) times a day as needed     • [DISCONTINUED] cloNIDine (Catapres) 0 1 mg tablet Take one tablet as needed daily for onset of anxiety attack    Take one tablet as needed at bedtime for sleep  Hold for BP <90/60 or for HR <60  (Patient not taking: Reported on 3/10/2023) 60 tablet 0     No current facility-administered medications on file prior to visit  Psychotherapy Provided:     Individual psychotherapy provided: Yes  Counseling was provided during the session today for 16 minutes  Supportive counseling provided  Medication changes discussed with Belem Ramos  Medication education provided to Belem Ramos  Discussed with Belem Ramos coping with ongoing anxiety  Coping strategies reviewed with Belem Ramos  Importance of medication and treatment compliance reviewed with Belem Ramos  Importance of follow up with family physician for medical issues reviewed with Belem Ramos  Reassurance and supportive therapy provided  Crisis/safety plan discussed with Belem Ramos  Patient will call prior to scheduled appointment if they have any issues or concerns  Patient understands they can access the office by calling the main number at any time if they are in crisis  They also understand they can call their [facility]'s crisis number or go to their nearest ED if suicidal ideation increases or if they develop a plan or intent  HPI ROS Appetite Changes and Sleep:     She reports improved sleep, adequate appetite, normal energy level   Denies homicidal ideation, denies suicidal ideation    Review Of Systems:     HPI ROS:               Medication Side Effects:  denies     Depression (10 worst): denies (Was 1-2/10)   Anxiety (10 worst): improved (Was 7-8/10)   Safety concerns (SI, HI, etc): denies (Was denies)   Sleep: improved (Was always fatigued)   Energy: improved (Was poor)   Appetite: good (Was good)   Weight Change: planning to have weight loss surgery in June        General normal    Personality no change in personality   Constitutional as noted in HPI   ENT negative   Cardiovascular negative   Respiratory cough and has been sick recently   Gastrointestinal negative   Genitourinary negative Musculoskeletal negative   Integumentary negative   Neurological negative   Endocrine negative   Other Symptoms none, all other systems are negative     Mental Status Evaluation:    Appearance Appropriately dressed and Good eye contact   Behavior calm and cooperative   Mood euthymic  Depression Scale - denies of 10 (0 = No depression)  Anxiety Scale - improved of 10 (0 = No anxiety)   Speech Normal rate and volume   Affect appropriate and mood-congruent   Thought Processes Goal directed and coherent   Thought Content Does not verbalize delusional material   Associations Tightly connected   Perceptual Disturbances Denies hallucinations and does not appear to be responding to internal stimuli   Risk Potential Suicidal/Homicidal Ideation - No evidence of suicidal or homicidal ideation and patient does not verbalize suicidal or homicidal ideation  Risk of Violence - No evidence of risk for violence found on assessment  Risk of Self Mutilation - No evidence of risk for self mutilation found on assessment   Orientation oriented to person, place, time/date and situation   Memory recent and remote memory grossly intact   Consciousness alert and awake   Attention/Concentration attention span and concentration are age appropriate   Insight intact   Judgement intact   Muscle Strength and Gait normal muscle strength and normal muscle tone, normal gait/station and normal balance   Motor Activity no abnormal movements   Language no difficulty naming common objects, no difficulty repeating a phrase, no difficulty writing a sentence   Fund of Knowledge adequate knowledge of current events  adequate fund of knowledge regarding past history  adequate fund of knowledge regarding vocabulary      Past Psychiatric History  Previous diagnoses include ADHD, Anxiety     Prior outpatient psychiatric treatment: PCP     Prior therapy: in the past, unable to recall where     Prior inpatient psychiatric treatment: denies     Prior suicide attempts: denies     Prior self harm: denies     Prior violence or aggression: denies    Past Psychiatric History Update:     Inpatient Psychiatric Admission Since Last Encounter:   no  Changes to Outpatient Psychiatric Treatment Team:    no  Suicide Attempt Or Self Mutilation Since Last Encounter:   no  Incidence of Violent Behavior Since Last Encounter:   no    Traumatic History Update:     New Onset of Abuse Since Last Encounter:   no  Traumatic Events Since Last Encounter:   no    Past Medical History:    Past Medical History:   Diagnosis Date   • ADHD (attention deficit hyperactivity disorder)    • Anxiety    • 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[facility] )     Prothrombin Gene Mutation Factor II   • Colon polyp    • Depression    • Disease of thyroid gland    • Ear problems    • Fatty liver    • GERD (gastroesophageal reflux disease)    • Hashimoto's disease    • Heart murmur    • Mitral valve prolapse    • Obesity (BMI 30-39  9)    • Panic attack    • PONV (postoperative nausea and vomiting)    • Prothrombin gene mutation (HCC)     Factor II   • Psoriatic arthritis ([facility] )    • Sleep difficulties    • SVT (supraventricular tachycardia) (HCC)    • Varicosities of leg         Past Surgical History:   Procedure Laterality Date   • CARDIAC ELECTROPHYSIOLOGY STUDY AND ABLATION      X 2 for SVT   • CARDIAC SURGERY  2017 and 2018    cardiac ablation   • CERVICAL BIOPSY  W/ LOOP ELECTRODE EXCISION  2004   • CHOLECYSTECTOMY     • COLONOSCOPY     • CO TONSILLECTOMY & ADENOIDECTOMY AGE 12/> N/A 12/10/2021    Procedure: TONSILLECTOMY & ADENOIDECTOMY;  Surgeon: Joshua Hathaway MD;  Location: [facility];  Service: ENT   • TONSILLECTOMY  9/2021   • WISDOM TOOTH EXTRACTION       Allergies   Allergen Reactions   • Nickel Hives, Itching and Rash     "Localized"     Substance Abuse History:    Social History     Substance and Sexual Activity   Alcohol Use Not Currently    Comment: rare Socially     Social History     Substance and Sexual Activity   Drug Use Never     Social History:    Social History     Socioeconomic History   • Marital status: Single     Spouse name: Not on file   • Number of children: 1   • Years of education: 2 yr college   • Highest education level: Associate degree: academic program   Occupational History   • Occupation: agency nursing   Tobacco Use   • Smoking status: Former     Packs/day: 0 50     Years: 22 00     Pack years: 11 00     Types: Cigarettes     Start date:      Quit date: 2016     Years since quittin 2   • Smokeless tobacco: Never   Vaping Use   • Vaping Use: Never used   Substance and Sexual Activity   • Alcohol use: Not Currently     Comment: rare Socially   • Drug use: Never   • Sexual activity: Yes     Partners: Male     Birth control/protection: Abstinence, Other   Other Topics Concern   • Not on file   Social History Narrative    · Most recent tobacco use screenin2019      · Do you currently or have you served in [employer]:   No      · Live alone or with others:   with others      · Are you currently employed: Yes      · Alcohol intake:   Occasional      · Illicit drugs:   none      · Caffeine intake:    Moderate      · Diet:   Regular      · Exercise level:   Occasional      · Education:   2 Year College      · Guns present in home:   No      · Seat belts used routinely:   Yes      · Sexual orientation:   Heterosexual      · Sunscreen used routinely:   Yes      · General stress level:   Medium      Social Determinants of Health     Financial Resource Strain: Not on file   Food Insecurity: Not on file   Transportation Needs: Not on file   Physical Activity: Not on file   Stress: Not on file   Social Connections: Not on file   Intimate Partner Violence: Not on file   Housing Stability: Not on file     Family Psychiatric History:     Family History   Problem Relation Age of Onset   • Hypothyroidism Mother    • Varicose Veins Mother    • Asthma Mother    • Hypothyroidism Father    • Heart disease Father    • Heart attack Father 52   • Emphysema Father    • COPD Father         former smoker   • Heart failure Father         heart attack   • Varicose Veins Sister    • Diabetes Maternal Grandmother    • Psoriasis Paternal Grandmother    • Diabetes Paternal Grandmother    • Anemia Cousin      History Review: The following portions of the patient's history were reviewed and updated as appropriate: allergies, current medications, past family history, past medical history, past social history, past surgical history and problem list     OBJECTIVE:     Vital signs in last 24 hours:    Vitals:    03/13/23 1045   BP: 117/74   Pulse: 70     Laboratory Results:   Recent Labs (last 2 months):   Orders Only on 02/03/2023   Component Date Value   • Hemoglobin A1C 02/03/2023 5 5      I have personally reviewed all pertinent laboratory/tests results  Suicide/Homicide Risk Assessment:    Risk of Harm to Self:  The following ratings are based on assessment at the time of the interview  Recent Specific Risk Factors include: current anxiety symptoms  Demographic risk factors include:   Historical Risk Factors include: history of depression, chronic anxiety symptoms  Protective Factors: no current suicidal ideation, access to mental health treatment, being a parent, being , compliant with medications, compliant with mental health treatment, having a desire to be alive, responsibilities and duties to others, stable living environment, stable job, strong relationships, supportive family  Based on today's assessment, Alma Delia Levy presents the following risk of harm to self: minimal    Risk of Harm to Others:   The following ratings are based on assessment at the time of the interview  Protective Factors: no current homicidal ideation  Based on today's assessment, Alma Delia Levy presents the following risk of harm to others: none    The following interventions are recommended: contracts for safety at present - agrees to go to ED if feeling unsafe, return in 1 month for reassessment, contracts for safety at present - agrees to call Crisis Intervention Service if feeling unsafe    Medications Risks/Benefits:      Risks, Benefits And Possible Side Effects Of Medications:    Discussed risks and benefits of treatment with patient including risk of suicidality, serotonin syndrome, increased QTc interval and SIADH related to treatment with antidepressants; Risk of induction of manic symptoms in certain patient populations and risks of cardiovascular side effects including elevated blood pressure, risk of misuse, abuse or dependence and risk of increased anxiety related to treatment with stimulant medications     Controlled Medication Discussion:     Sydnie Umaña has been filling controlled prescriptions on time as prescribed according to [facility]    Treatment Plan:    Due for update/Updated:   yes  Last treatment plan done 3/13/23 by SCOT Gomez  Treatment Plan due on 9/13/23  SCOT Teran 03/13/23    This note was shared with patient

## 2023-03-13 NOTE — BH TREATMENT PLAN
TREATMENT PLAN (Medication Management Only)        [address] [place] [address]    Name and Date of Birth:  Jackelin Licea 43 y o  1980  Date of Treatment Plan: March 13, 2023  Diagnosis/Diagnoses:    1  ADHD (attention deficit hyperactivity disorder), combined type    2  Anxiety      Strengths/Personal Resources for Self-Care: ability to communicate needs  Area/Areas of need (in own words): anxiety symptoms  1  Long Term Goal: continue improvement in acceptable anxiety level  Target Date:6 months - 9/13/2023  Person/Persons responsible for completion of goal: Joselin Lazaro  Short Term Objective (s) - How will we reach this goal?:   A  Provider new recommended medication/dosage changes and/or continue medication(s): continue current medications as prescribed  B  Get regular sleep every night   C  Continue to see medical doctors as needed  Target Date:6 months - 9/13/2023  Person/Persons Responsible for Completion of Goal: Raquel Morrow  Progress Towards Goals: starting treatment  Treatment Modality: medication management every 1 month, referral for individual psychotherapy, medication education at every visit  Review due 180 days from date of this plan: 6 months - 9/13/2023  Expected length of service: ongoing treatment  My Physician/PA/NP and I have developed this plan together and I agree to work on the goals and objectives  I understand the treatment goals that were developed for my treatment

## 2023-03-15 ENCOUNTER — APPOINTMENT (OUTPATIENT)
Dept: RADIOLOGY | Facility: CLINIC | Age: 43
End: 2023-03-15

## 2023-03-15 DIAGNOSIS — J06.9 UPPER RESPIRATORY TRACT INFECTION, UNSPECIFIED TYPE: ICD-10-CM

## 2023-03-15 DIAGNOSIS — J06.9 UPPER RESPIRATORY TRACT INFECTION, UNSPECIFIED TYPE: Primary | ICD-10-CM

## 2023-03-15 DIAGNOSIS — R06.2 WHEEZING: ICD-10-CM

## 2023-03-15 RX ORDER — PREDNISONE 10 MG/1
TABLET ORAL
Qty: 20 TABLET | Refills: 0 | Status: SHIPPED | OUTPATIENT
Start: 2023-03-15 | End: 2023-03-27

## 2023-03-16 DIAGNOSIS — S99.912S INJURY OF LEFT ANKLE, SEQUELA: Primary | ICD-10-CM

## 2023-03-27 DIAGNOSIS — R06.2 WHEEZING: ICD-10-CM

## 2023-03-27 DIAGNOSIS — J06.9 UPPER RESPIRATORY TRACT INFECTION, UNSPECIFIED TYPE: Primary | ICD-10-CM

## 2023-03-27 RX ORDER — FLUTICASONE PROPIONATE 100 UG/1
1 POWDER, METERED RESPIRATORY (INHALATION) 2 TIMES DAILY
Qty: 60 BLISTER | Refills: 0 | Status: SHIPPED | OUTPATIENT
Start: 2023-03-27 | End: 2023-05-05

## 2023-04-03 DIAGNOSIS — F90.1 ADHD, PREDOMINANTLY HYPERACTIVE TYPE: ICD-10-CM

## 2023-04-03 RX ORDER — DEXTROAMPHETAMINE SACCHARATE, AMPHETAMINE ASPARTATE MONOHYDRATE, DEXTROAMPHETAMINE SULFATE AND AMPHETAMINE SULFATE 2.5; 2.5; 2.5; 2.5 MG/1; MG/1; MG/1; MG/1
10 CAPSULE, EXTENDED RELEASE ORAL EVERY MORNING
Qty: 30 CAPSULE | Refills: 0 | Status: SHIPPED | OUTPATIENT
Start: 2023-04-03

## 2023-05-05 ENCOUNTER — OFFICE VISIT (OUTPATIENT)
Dept: FAMILY MEDICINE CLINIC | Facility: CLINIC | Age: 43
End: 2023-05-05

## 2023-05-05 VITALS
DIASTOLIC BLOOD PRESSURE: 84 MMHG | WEIGHT: 238 LBS | OXYGEN SATURATION: 98 % | HEIGHT: 65 IN | TEMPERATURE: 98.4 F | BODY MASS INDEX: 39.65 KG/M2 | HEART RATE: 75 BPM | SYSTOLIC BLOOD PRESSURE: 126 MMHG

## 2023-05-05 DIAGNOSIS — R10.11 RUQ PAIN: Primary | ICD-10-CM

## 2023-05-05 PROBLEM — G47.19 EXCESSIVE DAYTIME SLEEPINESS: Status: RESOLVED | Noted: 2022-12-16 | Resolved: 2023-05-05

## 2023-05-05 PROBLEM — E03.9 HYPOTHYROID: Status: RESOLVED | Noted: 2019-12-08 | Resolved: 2023-05-05

## 2023-05-05 PROBLEM — R06.2 WHEEZING: Status: RESOLVED | Noted: 2023-03-10 | Resolved: 2023-05-05

## 2023-05-05 PROBLEM — G47.9 SLEEP DISTURBANCE: Status: RESOLVED | Noted: 2022-02-17 | Resolved: 2023-05-05

## 2023-05-05 RX ORDER — GABAPENTIN 300 MG/1
CAPSULE ORAL
COMMUNITY
Start: 2023-04-08

## 2023-05-05 RX ORDER — PRAMIPEXOLE DIHYDROCHLORIDE 0.25 MG/1
TABLET ORAL
COMMUNITY
Start: 2023-03-12 | End: 2023-05-05

## 2023-05-05 NOTE — PROGRESS NOTES
"Gen Larson 1980 female MRN: [id number]      ASSESSMENT/PLAN  Problem List Items Addressed This Visit    None  Visit Diagnoses     RUQ pain    -  Primary    Relevant Orders    US right upper quadrant    Comprehensive metabolic panel    Lipase    CBC and differential        Unclear etiology of symptoms -- possibly MSK given tenderness to palpation? Will check LFTs/lipase and CBC and RUQ US to further evaluate  Reviewed ED precautions including worsening pain, inability to maintain PO hydration  Future Appointments   Date Time Provider Rosalba Dickinson   5/15/2023 11:00 AM SCOT Arana [facility]   7/14/2023 10:20 AM MD DANAE Joseph  [facility]          SUBJECTIVE  CC: Flank Pain (Rt sided - Noticed it a couple weeks ago but its has been getting more noticeable  Does not have a GB/)      HPI:  Gen Larson is a 43 y o  female who presents due to R sided abdominal pain  Not sure how long it has been going on, at least a few weeks, but has been getting more intense/frequent over the past week   Always there, but has episodes of worsening   Tender to touch   Has been having diarrhea, slight nausea   No urinary symptoms   No recent diet, medication changes, travel out of the country   Is s/p cholecystectomy -- pain is a little lower than the pain she experienced with that   Did have a fall a few weeks onto her L ankle; otherwise no trauma/injury   Was also sick in March, nothing recently       Review of Systems   Constitutional: Negative for fever  Respiratory: Negative for cough and shortness of breath  Gastrointestinal: Positive for abdominal pain, blood in stool, diarrhea and nausea (\"a teeny bit\" comes and goes)  Negative for constipation and vomiting  Genitourinary: Negative for dysuria, frequency, hematuria and urgency         Historical Information   The patient history was reviewed and updated as follows:    Past Medical History:   Diagnosis Date    ADHD " (attention deficit hyperactivity disorder)     Anxiety     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    Colon polyp     Depression     Disease of thyroid gland     Ear problems     Fatty liver     GERD (gastroesophageal reflux disease)     Hashimoto's disease     Heart murmur     Mitral valve prolapse     Obesity (BMI 30-39  9)     Panic attack     PONV (postoperative nausea and vomiting)     Prothrombin gene mutation (HCC)     Factor II    Psoriatic arthritis ([address] )     Sleep difficulties     SVT (supraventricular tachycardia) (HCC)     Varicosities of leg      Past Surgical History:   Procedure Laterality Date    CARDIAC ELECTROPHYSIOLOGY STUDY AND ABLATION      X 2 for SVT    CARDIAC SURGERY  2017 and 2018    cardiac ablation    CERVICAL BIOPSY  W/ LOOP ELECTRODE EXCISION  2004    CHOLECYSTECTOMY      COLONOSCOPY      PA TONSILLECTOMY & ADENOIDECTOMY AGE 12/> N/A 12/10/2021    Procedure: TONSILLECTOMY & ADENOIDECTOMY;  Surgeon: Rachna Carreon MD;  Location: [facility];  Service: ENT    TONSILLECTOMY  9/2021    WISDOM TOOTH EXTRACTION       Family History   Problem Relation Age of Onset    Hypothyroidism Mother     Varicose Veins Mother     Asthma Mother     Hypothyroidism Father     Heart disease Father     Heart attack Father 52    Emphysema Father    Jessica Aspen COPD Father         former smoker    Heart failure Father         heart attack    Varicose Veins Sister     Diabetes Maternal Grandmother     Psoriasis Paternal Grandmother     Diabetes Paternal Grandmother     Anemia Cousin       Social History   Social History     Substance and Sexual Activity   Alcohol Use Not Currently    Comment: rare Socially     Social History     Substance and Sexual Activity   Drug Use Never     Social History     Tobacco Use   Smoking Status Former    Packs/day: 0 50    Years: 22 00    Pack years: 11 00    Types: Cigarettes    Start date: 12    Quit date: 1/1/2016    "Years since quittin 3   Smokeless Tobacco Never       Medications:     Current Outpatient Medications:     albuterol (Ventolin HFA) 90 mcg/act inhaler, Inhale 2 puffs every 6 (six) hours as needed for wheezing, Disp: 18 g, Rfl: 5    ALPRAZolam (XANAX) 0 5 mg tablet, Take 1 tablet (0 5 mg total) by mouth daily as needed for anxiety, Disp: 60 tablet, Rfl: 0    amphetamine-dextroamphetamine (ADDERALL XR, 10MG,) 10 MG 24 hr capsule, Take 1 capsule (10 mg total) by mouth every morning Max Daily Amount: 10 mg, Disp: 30 capsule, Rfl: 0    Enbrel SureClick 50 MG/ML injection, Twice a week, Disp: , Rfl:     gabapentin (NEURONTIN) 300 mg capsule, , Disp: , Rfl:     hydrOXYzine HCL (ATARAX) 25 mg tablet, Take 1 tablet (25 mg total) by mouth daily as needed for anxiety, Disp: 30 tablet, Rfl: 0    levothyroxine 25 mcg tablet, Take 1 tablet (25 mcg total) by mouth daily in the early morning, Disp: 30 tablet, Rfl: 3    ergocalciferol (VITAMIN D2) 50,000 units, Take 50,000 Units by mouth once a week Takes on  , Disp: , Rfl:     escitalopram (LEXAPRO) 20 mg tablet, Take 20 mg by mouth daily in the early morning, Disp: , Rfl:     mirtazapine (REMERON) 7 5 MG tablet, Take 1 tablet (7 5 mg total) by mouth daily at bedtime, Disp: 30 tablet, Rfl: 1  Allergies   Allergen Reactions    Nickel Hives, Itching and Rash     \"Localized\"       OBJECTIVE    Vitals:   Vitals:    23 1044   BP: 126/84   BP Location: Left arm   Patient Position: Sitting   Cuff Size: Standard   Pulse: 75   Temp: 98 4 °F (36 9 °C)   SpO2: 98%   Weight: 108 kg (238 lb)   Height: 5' 5\" (1 651 m)           Physical Exam  Vitals and nursing note reviewed  Constitutional:       General: She is not in acute distress  Appearance: Normal appearance  HENT:      Head: Normocephalic and atraumatic  Cardiovascular:      Rate and Rhythm: Normal rate and regular rhythm     Pulmonary:      Effort: Pulmonary effort is normal  No respiratory " distress  Breath sounds: Normal breath sounds  Abdominal:      General: Bowel sounds are normal  There is no distension  Palpations: Abdomen is soft  Tenderness: There is no guarding or rebound  Comments: Tender to palpation as above and radiating to R-sided trunk wall    Neurological:      General: No focal deficit present  Mental Status: She is alert     Psychiatric:         Mood and Affect: Mood normal                     Maddie Persaud DO  [facility]   5/5/2023  11:03 AM

## 2023-05-15 ENCOUNTER — TELEMEDICINE (OUTPATIENT)
Dept: PSYCHIATRY | Facility: CLINIC | Age: 43
End: 2023-05-15

## 2023-05-15 DIAGNOSIS — F33.41 RECURRENT MAJOR DEPRESSIVE DISORDER, IN PARTIAL REMISSION (HCC): ICD-10-CM

## 2023-05-15 DIAGNOSIS — F90.2 ADHD (ATTENTION DEFICIT HYPERACTIVITY DISORDER), COMBINED TYPE: Primary | ICD-10-CM

## 2023-05-15 DIAGNOSIS — F41.1 GAD (GENERALIZED ANXIETY DISORDER): ICD-10-CM

## 2023-05-16 PROBLEM — F32.9 MDD (MAJOR DEPRESSIVE DISORDER): Status: ACTIVE | Noted: 2023-05-16

## 2023-05-16 PROBLEM — F41.1 GAD (GENERALIZED ANXIETY DISORDER): Status: ACTIVE | Noted: 2023-05-16

## 2023-05-16 NOTE — PSYCH
Regular Visit    Problem List Items Addressed This Visit        Other    ADHD (attention deficit hyperactivity disorder), combined type - Primary    MIKE (generalized anxiety disorder)    MDD (major depressive disorder)          Encounter provider SCOT Shipley    Provider located at    [id number] [facility] [address]  [address] [address] [phone]    Recent Visits  Date Type Provider Dept   05/15/23 Surjit Burr [address], [address] recent visits within past 7 days and meeting all other requirements  Future Appointments  No visits were found meeting these conditions  Showing future appointments within next 150 days and meeting all other requirements       HPI     Current Outpatient Medications   Medication Sig Dispense Refill   • albuterol (Ventolin HFA) 90 mcg/act inhaler Inhale 2 puffs every 6 (six) hours as needed for wheezing 18 g 5   • ALPRAZolam (XANAX) 0 5 mg tablet Take 1 tablet (0 5 mg total) by mouth daily as needed for anxiety 60 tablet 0   • amphetamine-dextroamphetamine (ADDERALL XR, 10MG,) 10 MG 24 hr capsule Take 1 capsule (10 mg total) by mouth every morning Max Daily Amount: 10 mg 30 capsule 0   • Enbrel SureClick 50 MG/ML injection Twice a week     • ergocalciferol (VITAMIN D2) 50,000 units Take 50,000 Units by mouth once a week Takes on Mondays      • escitalopram (LEXAPRO) 20 mg tablet Take 20 mg by mouth daily in the early morning     • gabapentin (NEURONTIN) 300 mg capsule      • hydrOXYzine HCL (ATARAX) 25 mg tablet Take 1 tablet (25 mg total) by mouth daily as needed for anxiety 30 tablet 0   • levothyroxine 25 mcg tablet Take 1 tablet (25 mcg total) by mouth daily in the early morning 30 tablet 3   • QUEtiapine (SEROquel) 25 mg tablet Take 1 tablet (25 mg total) by mouth daily at bedtime 30 tablet 0     No current facility-administered medications for this visit         Review of "Systems    I spent 30 minutes directly with the patient during this visit      [facility]    Name and Date of Birth:  Ivana Luis 43 y o  1980 MRN: [id number]    Date of Visit: May 15, 2023    Allergies   Allergen Reactions   • Nickel Hives, Itching and Rash     \"Localized\"       Visit Time    Visit Start Time: 7835  Visit Stop Time: 1130  Total Visit Duration: 30 minutes    SUBJECTIVE:    [facility] follow up for Major Depressive Disorder, Generalized Anxiety Disorder and ADHD  She continues to experience on and off anxiety symptoms since the last visit  [facility]INE seen virtually today for medication management follow up  She was last seen by this provider on 4/14/23  She states that she woke up feeling sick today so she was unsure that she was going to be able to make the appointment, so she switched to virtual instead of in person  She reports that she will start taking the increase of enbrel this week, as she just received the medication due to insurance issues  She is hopeful that it helps with her RA  She states that her sleep is still \"not good\"  She continues to have difficulty getting to sleep at night  We discussed sleep hygiene  1) Get up at the same time seven days out of the week; 2) Only go to bed when feeling sleepy; 3) Wind down in the evening without electronics; 4) Stimulus Control: If lying in bed for 15-20 minutes (estimated because the clock is turned away so you cannot see it) and you are not asleep get up and do something relaxing in a different room (reading a magazine article, solitaire with a deck of cards)  Do this in the middle of the night as well if awake  Avoid doing work or getting on the computer  ; 5) Bedroom for sleep only    No watching TV or using electronics (computer, phone, tablet etc )  in bed; 6) Turn clock away so you cannot see it in bed; 7) Exercise " "regularly but try to avoid exercise within 4 hours of bedtime  Morning exercise is best; 8) Avoid caffeine in the afternoon  Considering tapering down on caffeine by decreasing by one beverage with caffeine every 3 days until off ; 9) Avoid smoking near bedtime  She reports that she has been \"on edge\" recently  She rates her anxiety a 7-8/10  She rates her depression 1-2/10  She denies SI/HI  Denies auditory or visual hallucinations  She is otherwise calm and appropriate in conversation, pleasant and friendly  We discussed other options for her sleep  She has been on Lexapro for a long time and it was effective for her depression  She trialed trazodone, clonidine, gabapentin, remeron, for sleep, which were not effective  We will now initiate 25mg of seroquel to try to help with her depression/anxiety/sleep at this time  She is agreeable to this treatment plan at this time  She denies any side effects from current psychiatric medications  PLAN:  Continue medications as ordered:   Adderall XR 10mg PO daily   Lexapro 20mg PO daily  Atarax 25mg PO daily PRN for anxiety  Initiate Seroquel 25mg PO HS  Follow up in one month  She will call sooner with concerns or issues if they arise prior to scheduled appointment     Aware of 24 hour and weekend coverage for urgent situations accessed by calling [facility] main practice number  Medication management every 1 month  Aware of need to follow up with family physician for medical issues    Diagnoses and all orders for this visit:    ADHD (attention deficit hyperactivity disorder), combined type    MIKE (generalized anxiety disorder)    Recurrent major depressive disorder, in partial remission ([facility] [address] )        Current Outpatient Medications on File Prior to Visit   Medication Sig Dispense Refill   • albuterol (Ventolin HFA) 90 mcg/act inhaler Inhale 2 puffs every 6 (six) hours as needed for wheezing 18 g 5   • ALPRAZolam (XANAX) 0 5 mg " tablet Take 1 tablet (0 5 mg total) by mouth daily as needed for anxiety 60 tablet 0   • amphetamine-dextroamphetamine (ADDERALL XR, 10MG,) 10 MG 24 hr capsule Take 1 capsule (10 mg total) by mouth every morning Max Daily Amount: 10 mg 30 capsule 0   • Enbrel SureClick 50 MG/ML injection Twice a week     • ergocalciferol (VITAMIN D2) 50,000 units Take 50,000 Units by mouth once a week Takes on Mondays      • escitalopram (LEXAPRO) 20 mg tablet Take 20 mg by mouth daily in the early morning     • gabapentin (NEURONTIN) 300 mg capsule      • hydrOXYzine HCL (ATARAX) 25 mg tablet Take 1 tablet (25 mg total) by mouth daily as needed for anxiety 30 tablet 0   • levothyroxine 25 mcg tablet Take 1 tablet (25 mcg total) by mouth daily in the early morning 30 tablet 3   • QUEtiapine (SEROquel) 25 mg tablet Take 1 tablet (25 mg total) by mouth daily at bedtime 30 tablet 0     No current facility-administered medications on file prior to visit  Psychotherapy Provided:     Individual psychotherapy provided: Yes  Counseling was provided during the session today for 16 minutes  Supportive counseling provided  Medication changes discussed with Dana Pedraza  Medication education provided to Dana Pedraza  Recent stressor including chronic anxiety discussed with Dana Pedraza  Coping strategies reviewed with Dana Pedraza  Importance of medication and treatment compliance reviewed with Dana Pedraza  Importance of follow up with family physician for medical issues reviewed with Dana Pedraza  Reassurance and supportive therapy provided  Crisis/safety plan discussed with Dana Pedraza  Patient will call prior to scheduled appointment if they have any issues or concerns  Patient understands they can access the office by calling the main number at any time if they are in crisis  They also understand they can call their [facility]'s crisis number or go to their nearest ED if suicidal ideation increases or if they develop a plan or intent       HPI ROS Appetite Changes and Sleep:     She reports difficulty falling asleep, adequate appetite, adequate energy level   Denies homicidal ideation, denies suicidal ideation    Review Of Systems:  HPI ROS:               Medication Side Effects:  denies     Depression (10 worst): 1-2/10 (Was 0/10)   Anxiety (10 worst): 7-8/10 (Was 5/10)   Safety concerns (SI, HI, etc): denies (Was denies)   Sleep: Trouble falling asleep (Was trouble falling and staying asleep)   Energy: fair (Was fair)   Appetite: good (Was good)     General sleep disturbances   Personality no change in personality   Constitutional as noted in HPI   ENT negative   Cardiovascular negative   Respiratory negative   Gastrointestinal negative   Genitourinary negative   Musculoskeletal negative   Integumentary negative   Neurological negative   Endocrine negative   Other Symptoms RA being treated by PCP, all other systems are negative     Mental Status Evaluation:    Appearance Adequate hygiene and grooming and Good eye contact   Behavior calm and cooperative   Mood anxious  Depression Scale - 1-2 of 10 (0 = No depression)  Anxiety Scale - 7-8 of 10 (0 = No anxiety)   Speech Normal rate and volume   Affect mood-congruent   Thought Processes Goal directed and coherent   Thought Content Does not verbalize delusional material   Associations Tightly connected   Perceptual Disturbances Denies hallucinations and does not appear to be responding to internal stimuli   Risk Potential Suicidal/Homicidal Ideation - No evidence of suicidal or homicidal ideation and patient does not verbalize suicidal or homicidal ideation  Risk of Violence - No evidence of risk for violence found on assessment  Risk of Self Mutilation - Homicidal Ideations none   Orientation oriented to person, place, time/date and situation   Memory recent and remote memory grossly intact   Consciousness alert and awake   Attention/Concentration attention span and concentration are age appropriate   Insight fair Judgement fair   Muscle Strength and Gait normal muscle strength and normal muscle tone, normal gait/station and normal balance   Motor Activity no abnormal movements   Language no difficulty naming common objects, no difficulty repeating a phrase, no difficulty writing a sentence   Fund of Knowledge adequate knowledge of current events  adequate fund of knowledge regarding past history  adequate fund of knowledge regarding vocabulary      Past Psychiatric History  Previous diagnoses include ADHD, Anxiety  Prior outpatient psychiatric treatment: PCP  Prior therapy: in the past, unable to recall where  Prior inpatient psychiatric treatment: denies  Prior suicide attempts: denies  Prior self harm: denies  Prior violence or aggression: denies    Past Psychiatric History Update:     Inpatient Psychiatric Admission Since Last Encounter:   no  Changes to Outpatient Psychiatric Treatment Team:    no  Suicide Attempt Or Self Mutilation Since Last Encounter:   no  Incidence of Violent Behavior Since Last Encounter:   no    Traumatic History Update:     New Onset of Abuse Since Last Encounter:   no  Traumatic Events Since Last Encounter:   no    Past Medical History:    Past Medical History:   Diagnosis Date   • ADHD (attention deficit hyperactivity disorder)    • Anxiety    • 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[facility] )     Prothrombin Gene Mutation Factor II   • Colon polyp    • Depression    • Disease of thyroid gland    • Ear problems    • Fatty liver    • GERD (gastroesophageal reflux disease)    • Hashimoto's disease    • Heart murmur    • Mitral valve prolapse    • Obesity (BMI 30-39  9)    • Panic attack    • PONV (postoperative nausea and vomiting)    • Prothrombin gene mutation (HCC)     Factor II   • Psoriatic arthritis ([facility] )    • Sleep difficulties    • SVT (supraventricular tachycardia) (HCC)    • Varicosities of leg         Past Surgical History:   Procedure Laterality Date   • CARDIAC ELECTROPHYSIOLOGY STUDY AND ABLATION      X 2 "for SVT   • CARDIAC SURGERY   and 2018    cardiac ablation   • CERVICAL BIOPSY  W/ LOOP ELECTRODE EXCISION     • CHOLECYSTECTOMY     • COLONOSCOPY     • [place] TONSILLECTOMY & ADENOIDECTOMY AGE 12/> N/A 12/10/2021    Procedure: TONSILLECTOMY & ADENOIDECTOMY;  Surgeon: Javy Iyer MD;  Location: [facility];  Service: ENT   • TONSILLECTOMY  2021   • WISDOM TOOTH EXTRACTION       Allergies   Allergen Reactions   • Nickel Hives, Itching and Rash     \"Localized\"     Substance Abuse History:    Social History     Substance and Sexual Activity   Alcohol Use Not Currently    Comment: rare Socially     Social History     Substance and Sexual Activity   Drug Use Never     Social History:    Social History     Socioeconomic History   • Marital status: Single     Spouse name: Not on file   • Number of children: 1   • Years of education: 2 yr college   • Highest education level: Associate degree: academic program   Occupational History   • Occupation: agency nursing   Tobacco Use   • Smoking status: Former     Packs/day: 0 50     Years: 22 00     Pack years: 11 00     Types: Cigarettes     Start date:      Quit date: 2016     Years since quittin 3   • Smokeless tobacco: Never   Vaping Use   • Vaping Use: Never used   Substance and Sexual Activity   • Alcohol use: Not Currently     Comment: rare Socially   • Drug use: Never   • Sexual activity: Yes     Partners: Male     Birth control/protection: Abstinence, Other   Other Topics Concern   • Not on file   Social History Narrative    · Most recent tobacco use screenin2019      · Do you currently or have you served in [employer]:   No      · Live alone or with others:   with others      · Are you currently employed: Yes      · Alcohol intake:   Occasional      · Illicit drugs:   none      · Caffeine intake:    Moderate      · Diet:   Regular      · Exercise level:   Occasional      · Education:   [address] [place] [address]      · Guns present in home:   " No      · Seat belts used routinely:   Yes      · Sexual orientation:   Heterosexual      · Sunscreen used routinely:   Yes      · General stress level:   Medium      Social Determinants of Health     Financial Resource Strain: Not on file   Food Insecurity: Not on file   Transportation Needs: Not on file   Physical Activity: Not on file   Stress: Not on file   Social Connections: Not on file   Intimate Partner Violence: Not on file   Housing Stability: Not on file     Family Psychiatric History:     Family History   Problem Relation Age of Onset   • Hypothyroidism Mother    • Varicose Veins Mother    • Asthma Mother    • Hypothyroidism Father    • Heart disease Father    • Heart attack Father 52   • Emphysema Father    • COPD Father         former smoker   • Heart failure Father         heart attack   • Varicose Veins Sister    • Diabetes Maternal Grandmother    • Psoriasis Paternal Grandmother    • Diabetes Paternal Grandmother    • Anemia Cousin      History Review: The following portions of the patient's history were reviewed and updated as appropriate: allergies, current medications, past family history, past medical history, past social history, past surgical history and problem list     OBJECTIVE:     Vital signs in last 24 hours: There were no vitals filed for this visit  Laboratory Results:   Recent Labs (last 2 months):   No visits with results within 2 Month(s) from this visit  Latest known visit with results is:   Orders Only on 02/03/2023   Component Date Value   • Hemoglobin A1C 02/03/2023 5 5      I have personally reviewed all pertinent laboratory/tests results      Suicide/Homicide Risk Assessment:    Risk of Harm to Self:  The following ratings are based on assessment at the time of the interview  Recent Specific Risk Factors include: current depressive symptoms, current anxiety symptoms  Demographic risk factors include:   Historical Risk Factors include: chronic depressive symptoms, chronic anxiety symptoms  Protective Factors: no current suicidal ideation, access to mental health treatment, being a parent, being , compliant with medications, compliant with mental health treatment, effective coping skills, having a desire to be alive, resiliency, responsibilities and duties to others, stable living environment, stable job, sense of determination, sense of importance of health and wellness, strong relationships, supportive family  Based on today's assessment, Jose Boucher presents the following risk of harm to self: low    Risk of Harm to Others: The following ratings are based on assessment at the time of the interview  Protective Factors: no current homicidal ideation  Based on today's assessment, Jose Boucher presents the following risk of harm to others: none    The following interventions are recommended: contracts for safety at present - agrees to go to ED if feeling unsafe, return in 1 month for reassessment, contracts for safety at present - agrees to call Crisis Intervention Service if feeling unsafe    Medications Risks/Benefits:      Risks, Benefits And Possible Side Effects Of Medications:    Discussed risks and benefits of treatment with patient including risk of suicidality, serotonin syndrome, increased QTc interval and SIADH related to treatment with antidepressants;  Risk of induction of manic symptoms in certain patient populations, risk of parkinsonian symptoms, metabolic syndrome, tardive dyskinesia and neuroleptic malignant syndrome related to treatment with antipsychotic medications and risks of cardiovascular side effects including elevated blood pressure, risk of misuse, abuse or dependence and risk of increased anxiety related to treatment with stimulant medications     Controlled Medication Discussion:     Jose Boucher has been filling controlled prescriptions on time as prescribed according to [place] Prescription Drug Monitoring Program    Treatment Plan:    Due for update/Updated:   no  Last treatment plan done 3/13/23 by SCOT Dunn  Treatment Plan due on 9/13/23  SCOT Ballesteros 05/16/23    This note was shared with patient

## 2023-06-05 ENCOUNTER — TELEPHONE (OUTPATIENT)
Dept: PSYCHIATRY | Facility: CLINIC | Age: 43
End: 2023-06-05

## 2023-06-09 ENCOUNTER — TELEPHONE (OUTPATIENT)
Dept: PSYCHIATRY | Facility: CLINIC | Age: 43
End: 2023-06-09

## 2023-06-09 DIAGNOSIS — F41.9 ANXIETY: ICD-10-CM

## 2023-06-09 RX ORDER — ALPRAZOLAM 0.5 MG/1
0.5 TABLET ORAL DAILY PRN
Qty: 60 TABLET | Refills: 0 | Status: SHIPPED | OUTPATIENT
Start: 2023-06-09

## 2023-06-09 NOTE — TELEPHONE ENCOUNTER
Marge Hooper called here she is having some side effects from the medication and would like to speak to you

## 2023-06-09 NOTE — TELEPHONE ENCOUNTER
States that she has been having tachycardia since starting seroquel  She was told to stop the seroquel and if the tachycardia episodes do not resolve, to call her PCP  She verbalized understanding

## 2023-06-13 ENCOUNTER — TELEPHONE (OUTPATIENT)
Dept: PSYCHIATRY | Facility: CLINIC | Age: 43
End: 2023-06-13

## 2023-06-26 ENCOUNTER — DOCUMENTATION (OUTPATIENT)
Dept: BEHAVIORAL/MENTAL HEALTH CLINIC | Facility: CLINIC | Age: 43
End: 2023-06-26

## 2023-07-07 ENCOUNTER — OFFICE VISIT (OUTPATIENT)
Dept: PSYCHIATRY | Facility: CLINIC | Age: 43
End: 2023-07-07

## 2023-07-07 DIAGNOSIS — F31.81 BIPOLAR 2 DISORDER, MAJOR DEPRESSIVE EPISODE (HCC): Primary | ICD-10-CM

## 2023-07-07 PROBLEM — F41.9 ANXIETY: Status: RESOLVED | Noted: 2022-02-17 | Resolved: 2023-07-07

## 2023-07-07 RX ORDER — LAMOTRIGINE 25 MG/1
TABLET ORAL
Qty: 42 TABLET | Refills: 0 | Status: SHIPPED | OUTPATIENT
Start: 2023-07-07 | End: 2023-08-04

## 2023-07-07 RX ORDER — PANTOPRAZOLE SODIUM 40 MG/1
40 TABLET, DELAYED RELEASE ORAL DAILY
COMMUNITY
Start: 2023-06-30

## 2023-07-10 NOTE — PSYCH
Regular Visit    Problem List Items Addressed This Visit        Other    Bipolar 2 disorder, major depressive episode ([address]) - Primary    Relevant Medications    lamoTRIgine (LaMICtal) 25 mg tablet          Encounter provider SCOT Mensah    Provider located at    [address] [id number] [facility] [id number] [phone]    Recent Visits  Date Type Provider Dept   07/07/23 Office Visit David Escalona, [facility] recent visits within past 7 days and meeting all other requirements  Future Appointments  No visits were found meeting these conditions. Showing future appointments within next 150 days and meeting all other requirements       HPI     Current Outpatient Medications   Medication Sig Dispense Refill   • albuterol (Ventolin HFA) 90 mcg/act inhaler Inhale 2 puffs every 6 (six) hours as needed for wheezing 18 g 5   • ALPRAZolam (XANAX) 0.5 mg tablet Take 1 tablet (0.5 mg total) by mouth daily as needed for anxiety 60 tablet 0   • Enbrel SureClick 50 MG/ML injection Twice a week     • ergocalciferol (VITAMIN D2) 50,000 units Take 50,000 Units by mouth once a week Takes on Mondays      • escitalopram (LEXAPRO) 20 mg tablet Take 10 mg by mouth daily in the early morning     • lamoTRIgine (LaMICtal) 25 mg tablet Take 1 tablet (25 mg total) by mouth daily for 14 days, THEN 2 tablets (50 mg total) daily for 14 days. 42 tablet 0   • levothyroxine 25 mcg tablet Take 1 tablet (25 mcg total) by mouth daily in the early morning 30 tablet 3   • pantoprazole (PROTONIX) 40 mg tablet Take 40 mg by mouth daily       No current facility-administered medications for this visit. Review of Systems    I spent 30 minutes directly with the patient during this visit      [address]    Name and Date of Birth:  Bry Pena 43 y.o. 1980 MRN: [id number]    Date of Visit: July 7, 2023    Allergies   Allergen Reactions   • Nickel Hives, Itching and Rash     "Localized"       Visit Time    Visit Start Time: 1400  Visit Stop Time: 1430  Total Visit Duration: 30 minutes    SUBJECTIVE:    Lor Hill is seen today for a follow up for Major Depressive Disorder, Generalized Anxiety Disorder and ADHD. She continues to experience on and off anxiety symptoms since the last visit. Lor Hill seen virtually today for medication management follow up. She was last seen by this provider on 5/15/23. She reports today that she has "been a mess". She states that she has had a lot of anxiety and anger. She had weight loss surgery recently and had to stop her Adderall XR. She has been taking Xanax every day as prescribed by her PCP. She states that her focus and concentration is poor and has been starting projects and has not been able to continue or finish them. She often gets distracted and side tracked. She reports that her sleep has been fair getting 7 to 8 hours per night. She has been having some lingering pain rated 5 out of 10 due to her gastric bypass surgery. She denies any depression, but has been angry and labile, tearful. She states that her family is getting very frustrated with her. She is not currently working right now. She is only on clear liquids and has been frustrated because she is unable to eat with the rest of her family. She is voicing some signs and symptoms of possible bipolar disorder. She is having mood swings, distractibility, she is very talkative, pressured in speech. She is circumstantial.  She does not voice any overt delusions. She denies any auditory and visual hallucinations. She does not have any suicidal or homicidal ideation. At this time, we will not reinitiate the Adderall.   Due to her lability, anger, tearfulness, irritability, and distractibility we will initiate Lamictal 25 mg p.o. daily x14 days, then increase to 50 mg p.o. daily. She will follow up with this provider in 3 weeks. She is in agreement with this treatment plan at this time. She denies any side effects from current psychiatric medications. PLAN:  Continue medications as ordered:  Lexapro 20mg PO daily  Initiate Lamictal 25mg PO daily x 14 days, then 50mg PO daily x 14 days  Follow up in 3 weeks  She will call sooner with concerns or issues if they arise prior to scheduled appointment     Aware of 24 hour and weekend coverage for urgent situations accessed by calling [facility] main practice number  Medication management every 3 weeks  Aware of need to follow up with family physician for medical issues    Diagnoses and all orders for this visit:    Bipolar 2 disorder, major depressive episode ([address])  -     lamoTRIgine (LaMICtal) 25 mg tablet; Take 1 tablet (25 mg total) by mouth daily for 14 days, THEN 2 tablets (50 mg total) daily for 14 days. Lamotrigine PARQ completed including dizziness, headaches, ataxia, vision problems, somnolence, sleep changes, cognitive difficulties, rash (including Edouard-Ad rash), and others, risk of teratogenicity for females.      Current Outpatient Medications on File Prior to Visit   Medication Sig Dispense Refill   • albuterol (Ventolin HFA) 90 mcg/act inhaler Inhale 2 puffs every 6 (six) hours as needed for wheezing 18 g 5   • ALPRAZolam (XANAX) 0.5 mg tablet Take 1 tablet (0.5 mg total) by mouth daily as needed for anxiety 60 tablet 0   • Enbrel SureClick 50 MG/ML injection Twice a week     • ergocalciferol (VITAMIN D2) 50,000 units Take 50,000 Units by mouth once a week Takes on Mondays      • escitalopram (LEXAPRO) 20 mg tablet Take 10 mg by mouth daily in the early morning     • levothyroxine 25 mcg tablet Take 1 tablet (25 mcg total) by mouth daily in the early morning 30 tablet 3   • pantoprazole (PROTONIX) 40 mg tablet Take 40 mg by mouth daily       No current facility-administered medications on file prior to visit. Psychotherapy Provided:     Individual psychotherapy provided: Yes  Counseling was provided during the session today for 16 minutes. Supportive counseling provided. Medication changes discussed with Bear Mullen. Medication education provided to Bear Mullen. Recent stressor including chronic anxiety discussed with Bear Mullen. Coping strategies reviewed with Bear Mullen. Importance of medication and treatment compliance reviewed with Bear Mullen. Importance of follow up with family physician for medical issues reviewed with Bear Mullen. Reassurance and supportive therapy provided. Crisis/safety plan discussed with Bear Mullen. Patient will call prior to scheduled appointment if they have any issues or concerns. Patient understands they can access the office by calling the main number at any time if they are in crisis. They also understand they can call their [facility]'s crisis number or go to their nearest ED if suicidal ideation increases or if they develop a plan or intent. HPI ROS Appetite Changes and Sleep:     She reports difficulty falling asleep, adequate appetite, adequate energy level. Denies homicidal ideation, denies suicidal ideation    Review Of Systems:  HPI ROS:               Medication Side Effects:   denies   Depression (10 worst): 0/10 1-2/10   Anxiety (10 worst): 8/10 7-8/10   Safety concerns (SI, HI, etc): denies denies   Sleep: 7-8 hours/night Trouble falling asleep   Energy: fair fair   Appetite:  On clear liquids due to her gastric surgery good     General emotional problems   Personality change in personality, angry and labile   Constitutional as noted in HPI   ENT negative   Cardiovascular negative   Respiratory negative   Gastrointestinal abdominal discomfort and as noted in HPI   Genitourinary negative   Musculoskeletal negative   Integumentary negative   Neurological negative   Endocrine negative   Other Symptoms RA being treated by PCP, all other systems are negative     Mental Status Evaluation:    Appearance Adequate hygiene and grooming and Good eye contact   Behavior calm and cooperative   Mood anxious  Depression Scale - 1-2 of 10 (0 = No depression)  Anxiety Scale - 7-8 of 10 (0 = No anxiety)   Speech Normal volume and Pressured   Affect labile and Tearful   Thought Processes Circumstantial   Thought Content Does not verbalize delusional material   Associations Loosely connected   Perceptual Disturbances Denies hallucinations and does not appear to be responding to internal stimuli   Risk Potential Suicidal/Homicidal Ideation - No evidence of suicidal or homicidal ideation and patient does not verbalize suicidal or homicidal ideation  Risk of Violence - No evidence of risk for violence found on assessment  Risk of Self Mutilation - Homicidal Ideations none   Orientation oriented to person, place, time/date and situation   Memory recent and remote memory grossly intact   Consciousness alert and awake   Attention/Concentration attention span and concentration are age appropriate   Insight fair   Judgement fair   Muscle Strength and Gait normal muscle strength and normal muscle tone, normal gait/station and normal balance   Motor Activity no abnormal movements   Language no difficulty naming common objects, no difficulty repeating a phrase, no difficulty writing a sentence   Fund of Knowledge adequate knowledge of current events  adequate fund of knowledge regarding past history  adequate fund of knowledge regarding vocabulary      Past Psychiatric History  Previous diagnoses include ADHD, Anxiety  Prior outpatient psychiatric treatment: PCP  Prior therapy: in the past, unable to recall where  Prior inpatient psychiatric treatment: denies  Prior suicide attempts: denies  Prior self harm: denies  Prior violence or aggression: denies    Past Psychiatric History Update:     Inpatient Psychiatric Admission Since Last Encounter:   no  Changes to Outpatient Psychiatric Treatment Team:    no  Suicide Attempt Or Self Mutilation Since Last Encounter:   no  Incidence of Violent Behavior Since Last Encounter:   no    Traumatic History Update:     New Onset of Abuse Since Last Encounter:   no  Traumatic Events Since Last Encounter:   no    Past Medical History:    Past Medical History:   Diagnosis Date   • ADHD (attention deficit hyperactivity disorder)    • Anxiety    • 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 9)    • Panic attack    • PONV (postoperative nausea and vomiting)    • Prothrombin gene mutation (HCC)     Factor II   • Psoriatic arthritis ([address])    • Sleep difficulties    • SVT (supraventricular tachycardia) (HCC)    • Varicosities of leg         Past Surgical History:   Procedure Laterality Date   • CARDIAC ELECTROPHYSIOLOGY STUDY AND ABLATION      X 2 for SVT   • CARDIAC SURGERY  2017 and 2018    cardiac ablation   • CERVICAL BIOPSY  W/ LOOP ELECTRODE EXCISION  2004   • CHOLECYSTECTOMY     • COLONOSCOPY     • SD TONSILLECTOMY & ADENOIDECTOMY AGE 12/> N/A 12/10/2021    Procedure: TONSILLECTOMY & ADENOIDECTOMY;  Surgeon: Elgin Hernandez MD;  Location: [facility];  Service: ENT   • TONSILLECTOMY  9/2021   • WISDOM TOOTH EXTRACTION       Allergies   Allergen Reactions   • Nickel Hives, Itching and Rash     "Localized"     Substance Abuse History:    Social History     Substance and Sexual Activity   Alcohol Use Not Currently    Comment: rare Socially     Social History     Substance and Sexual Activity   Drug Use Never     Social History:    Social History     Socioeconomic History   • Marital status: Single     Spouse name: Not on file   • Number of children: 1   • Years of education: 2 yr college   • Highest education level: Associate degree: academic program   Occupational History   • Occupation: agency nursing   Tobacco Use   • Smoking status: Former     Packs/day: 0.50     Years: 22.00     Total pack years: 11.00     Types: Cigarettes     Start date: 12     Quit date: 2016     Years since quittin.5   • Smokeless tobacco: Never   Vaping Use   • Vaping Use: Never used   Substance and Sexual Activity   • Alcohol use: Not Currently     Comment: rare Socially   • Drug use: Never   • Sexual activity: Yes     Partners: Male     Birth control/protection: Abstinence, Other   Other Topics Concern   • Not on file   Social History Narrative    · Most recent tobacco use screenin2019      · Do you currently or have you served in the [address] [place] [address] [employer]:   No      · Live alone or with others:   with others      · Are you currently employed: Yes      · Alcohol intake:   Occasional      · Illicit drugs:   none      · Caffeine intake:    Moderate      · Diet:   Regular      · Exercise level:   Occasional      · Education:   2 Year College      · Guns present in home:   No      · Seat belts used routinely:   Yes      · Sexual orientation:   Heterosexual      · Sunscreen used routinely:   Yes      · General stress level:   Medium      Social Determinants of Health     Financial Resource Strain: Not on file   Food Insecurity: Not on file   Transportation Needs: Not on file   Physical Activity: Not on file   Stress: Not on file   Social Connections: Not on file   Intimate Partner Violence: Not on file   Housing Stability: Not on file     Family Psychiatric History:     Family History   Problem Relation Age of Onset   • Hypothyroidism Mother    • Varicose Veins Mother    • Asthma Mother    • Hypothyroidism Father    • Heart disease Father    • Heart attack Father 52   • Emphysema Father    • COPD Father         former smoker   • Heart failure Father         heart attack   • Varicose Veins Sister    • Diabetes Maternal Grandmother    • Psoriasis Paternal Grandmother    • Diabetes Paternal Grandmother    • Anemia Cousin      History Review: The following portions of the patient's history were reviewed and updated as appropriate: allergies, current medications, past family history, past medical history, past social history, past surgical history and problem list     OBJECTIVE:     Vital signs in last 24 hours: There were no vitals filed for this visit. Laboratory Results:   Recent Labs (last 2 months):   No visits with results within 2 Month(s) from this visit. Latest known visit with results is:   Orders Only on 02/03/2023   Component Date Value   • Hemoglobin A1C 02/03/2023 5.5      I have personally reviewed all pertinent laboratory/tests results. Suicide/Homicide Risk Assessment:    Risk of Harm to Self:  The following ratings are based on assessment at the time of the interview  Recent Specific Risk Factors include: current depressive symptoms, current anxiety symptoms  Demographic risk factors include:   Historical Risk Factors include: chronic depressive symptoms, chronic anxiety symptoms  Protective Factors: no current suicidal ideation, access to mental health treatment, being a parent, being , compliant with medications, compliant with mental health treatment, effective coping skills, having a desire to be alive, resiliency, responsibilities and duties to others, stable living environment, stable job, sense of determination, sense of importance of health and wellness, strong relationships, supportive family  Based on today's assessment, Kali Major presents the following risk of harm to self: low    Risk of Harm to Others:   The following ratings are based on assessment at the time of the interview  Protective Factors: no current homicidal ideation  Based on today's assessment, Kali Major presents the following risk of harm to others: none    The following interventions are recommended: contracts for safety at present - agrees to go to ED if feeling unsafe, return in 1 month for reassessment, contracts for safety at present - agrees to call Crisis Intervention Service if feeling unsafe    Medications Risks/Benefits:      Risks, Benefits And Possible Side Effects Of Medications:    Discussed risks and benefits of treatment with patient including risk of suicidality, serotonin syndrome, increased QTc interval and SIADH related to treatment with antidepressants; Risk of induction of manic symptoms in certain patient populations, risk of parkinsonian symptoms, metabolic syndrome, tardive dyskinesia and neuroleptic malignant syndrome related to treatment with antipsychotic medications and risks of cardiovascular side effects including elevated blood pressure, risk of misuse, abuse or dependence and risk of increased anxiety related to treatment with stimulant medications     Controlled Medication Discussion:     Nasir Quiñones has been filling controlled prescriptions on time as prescribed according to  [facility]    Treatment Plan:    Due for update/Updated:   no  Last treatment plan done 3/13/23 by SCOT Huber. Treatment Plan due on 9/13/23. SCOT Regan 07/10/23    This note was shared with patient.

## 2023-08-04 ENCOUNTER — OFFICE VISIT (OUTPATIENT)
Dept: PSYCHIATRY | Facility: CLINIC | Age: 43
End: 2023-08-04
Payer: COMMERCIAL

## 2023-08-04 DIAGNOSIS — F31.81 BIPOLAR 2 DISORDER, MAJOR DEPRESSIVE EPISODE (HCC): Primary | ICD-10-CM

## 2023-08-04 PROCEDURE — 99214 OFFICE O/P EST MOD 30 MIN: CPT | Performed by: NURSE PRACTITIONER

## 2023-08-04 RX ORDER — LAMOTRIGINE 25 MG/1
50 TABLET ORAL DAILY
Qty: 60 TABLET | Refills: 0 | Status: SHIPPED | OUTPATIENT
Start: 2023-08-04

## 2023-08-04 RX ORDER — ESCITALOPRAM OXALATE 5 MG/1
TABLET ORAL
Qty: 7 TABLET | Refills: 0 | Status: SHIPPED | OUTPATIENT
Start: 2023-08-04

## 2023-08-04 NOTE — PSYCH
Regular Visit    Problem List Items Addressed This Visit        Other    Bipolar 2 disorder, major depressive episode ([address]) - Primary    Relevant Medications    escitalopram (LEXAPRO) 5 mg tablet    lamoTRIgine (LaMICtal) 25 mg tablet          Encounter provider SCOT Orta    Provider located at    [address] [address] [facility] [id number] [phone]    Recent Visits  No visits were found meeting these conditions. Showing recent visits within past 7 days and meeting all other requirements  Today's Visits  Date Type Provider Dept   08/04/23 Office Visit Quentin Martin, [address]  today's visits and meeting all other requirements  Future Appointments  No visits were found meeting these conditions. Showing future appointments within next 150 days and meeting all other requirements       HPI     Current Outpatient Medications   Medication Sig Dispense Refill   • escitalopram (LEXAPRO) 5 mg tablet Take 5mg every other day for one week, then decrease to 1/2 tab (2.5mg) every other day x 1 week. 7 tablet 0   • lamoTRIgine (LaMICtal) 25 mg tablet Take 2 tablets (50 mg total) by mouth daily 60 tablet 0   • albuterol (Ventolin HFA) 90 mcg/act inhaler Inhale 2 puffs every 6 (six) hours as needed for wheezing 18 g 5   • ALPRAZolam (XANAX) 0.5 mg tablet Take 1 tablet (0.5 mg total) by mouth daily as needed for anxiety 60 tablet 0   • Enbrel SureClick 50 MG/ML injection Twice a week     • ergocalciferol (VITAMIN D2) 50,000 units Take 50,000 Units by mouth once a week Takes on Mondays      • levothyroxine 25 mcg tablet Take 1 tablet (25 mcg total) by mouth daily in the early morning 30 tablet 3   • pantoprazole (PROTONIX) 40 mg tablet Take 40 mg by mouth daily       No current facility-administered medications for this visit.        Review of Systems    I spent 30 minutes directly with the patient during this visit      MEDICATION MANAGEMENT NOTE        [facility]    Name and Date of Birth:  Yessy Salas 43 y.o. 1980 MRN: [id number]    Date of Visit: August 4, 2023    Allergies   Allergen Reactions   • Nickel Hives, Itching and Rash     "Localized"       Visit Time    Visit Start Time: 7070  Visit Stop Time: 1200  Total Visit Duration: 30 minutes    SUBJECTIVE:    Lor Hill is seen today for a follow up for Major Depressive Disorder, Generalized Anxiety Disorder and ADHD. She continues to experience on and off anxiety symptoms since the last visit. Lor Hill seen in the office today for medication management follow up. She was last seen by this provider on 7/7/23. Lor Hill is calm and appropriate in conversation today. Visibly less anxious, less pressured, and goal oriented and linear in conversation. She states that she has decreased the Lexapro to 10 mg p.o. every other day, and had discontinued it altogether, however she has been feeling some "brain zaps". She states that other than that she has had no significant side effects. She states that her mood is more level, and her irritability has decreased significantly. She states that her anxiety is decreased, rated 5 out of 10. She denies depression. She denies suicidal and homicidal ideation. She denies any auditory or visual hallucinations. She states that her sleep had improved all [place] she is looking forward to a vacation in [address] [place] [address]. She is very happy with the medication change. At this time we will continue the 50 mg of Lamictal.  We will initiate 5 mg of Lexapro every other day to help combat withdrawal effects. We will do 5 mg every other day for 1 week, then decrease to 2.5 mg p.o. every other day for 1 week then discontinue. She is in agreement with this. She will call with any concerns or issues prior to scheduled appointment.     She denies any side effects from current psychiatric medications. PLAN:  Continue medications as ordered:  Continue Lexapro taper 5 mg p.o. every other day x 1 week, then decrease to 2.5 mg p.o. every other day x 1 week  Continue with Lamictal 50 mg daily  Follow up in 1 month  She will call sooner with concerns or issues if they arise prior to scheduled appointment     Aware of 24 hour and weekend coverage for urgent situations accessed by calling [facility] main practice number  Medication management every 1 month  Aware of need to follow up with family physician for medical issues    Diagnoses and all orders for this visit:    Bipolar 2 disorder, major depressive episode (HCC)  -     lamoTRIgine (LaMICtal) 25 mg tablet; Take 1 tablet (25 mg total) by mouth daily for 14 days, THEN 2 tablets (50 mg total) daily for 14 days. Lamotrigine PARQ completed including dizziness, headaches, ataxia, vision problems, somnolence, sleep changes, cognitive difficulties, rash (including Edouard-Ad rash), and others, risk of teratogenicity for females.      Current Outpatient Medications on File Prior to Visit   Medication Sig Dispense Refill   • albuterol (Ventolin HFA) 90 mcg/act inhaler Inhale 2 puffs every 6 (six) hours as needed for wheezing 18 g 5   • ALPRAZolam (XANAX) 0.5 mg tablet Take 1 tablet (0.5 mg total) by mouth daily as needed for anxiety 60 tablet 0   • Enbrel SureClick 50 MG/ML injection Twice a week     • ergocalciferol (VITAMIN D2) 50,000 units Take 50,000 Units by mouth once a week Takes on Mondays      • levothyroxine 25 mcg tablet Take 1 tablet (25 mcg total) by mouth daily in the early morning 30 tablet 3   • pantoprazole (PROTONIX) 40 mg tablet Take 40 mg by mouth daily     • [DISCONTINUED] escitalopram (LEXAPRO) 20 mg tablet Take 10 mg by mouth daily in the early morning     • [DISCONTINUED] lamoTRIgine (LaMICtal) 25 mg tablet Take 1 tablet (25 mg total) by mouth daily for 14 days, THEN 2 tablets (50 mg total) daily for 14 days. 42 tablet 0     No current facility-administered medications on file prior to visit. Psychotherapy Provided:     Individual psychotherapy provided: Yes  Counseling was provided during the session today for 16 minutes. Supportive counseling provided. Medication changes discussed with Megan Lazo. Medication education provided to Megan Lazo. Recent stressor including chronic anxiety discussed with Megan Lazo. Coping strategies reviewed with Megan Lazo. Importance of medication and treatment compliance reviewed with Megan Lazo. Importance of follow up with family physician for medical issues reviewed with Megan Lazo. Reassurance and supportive therapy provided. Crisis/safety plan discussed with Megan Lazo. Patient will call prior to scheduled appointment if they have any issues or concerns. Patient understands they can access the office by calling the main number at any time if they are in crisis. They also understand they can call their [facility]'s crisis number or go to their nearest ED if suicidal ideation increases or if they develop a plan or intent. HPI ROS Appetite Changes and Sleep:     She reports difficulty falling asleep, adequate appetite, adequate energy level.  Denies homicidal ideation, denies suicidal ideation    Review Of Systems:  HPI ROS:               Medication Side Effects: denies    Depression (10 worst): 0/10 0/10   Anxiety (10 worst): 5/10 8/10   Safety concerns (SI, HI, etc): denies denies   Sleep: improved 7-8 hours/night   Energy: good fair   Appetite: good On clear liquids due to her gastric surgery     General normal    Personality normal   Constitutional as noted in HPI   ENT negative   Cardiovascular negative   Respiratory negative   Gastrointestinal negative   Genitourinary negative   Musculoskeletal negative   Integumentary negative   Neurological negative   Endocrine negative   Other Symptoms RA being treated by PCP, all other systems are negative     Mental Status Evaluation:    Appearance Adequate hygiene and grooming and Good eye contact   Behavior calm and cooperative   Mood anxious and improved  Depression Scale - 0 of 10 (0 = No depression)  Anxiety Scale - 5 of 10 (0 = No anxiety)   Speech Normal rate and volume   Affect appropriate and mood-congruent   Thought Processes Goal directed and coherent   Thought Content Does not verbalize delusional material   Associations Tightly connected   Perceptual Disturbances Denies hallucinations and does not appear to be responding to internal stimuli   Risk Potential Suicidal/Homicidal Ideation - No evidence of suicidal or homicidal ideation and patient does not verbalize suicidal or homicidal ideation  Risk of Violence - No evidence of risk for violence found on assessment  Risk of Self Mutilation - Homicidal Ideations none   Orientation oriented to person, place, time/date and situation   Memory recent and remote memory grossly intact   Consciousness alert and awake   Attention/Concentration attention span and concentration are age appropriate   Insight intact   Judgement intact   Muscle Strength and Gait normal muscle strength and normal muscle tone, normal gait/station and normal balance   Motor Activity no abnormal movements   Language no difficulty naming common objects, no difficulty repeating a phrase, no difficulty writing a sentence   Fund of Knowledge adequate knowledge of current events  adequate fund of knowledge regarding past history  adequate fund of knowledge regarding vocabulary      Past Psychiatric History  Previous diagnoses include ADHD, Anxiety  Prior outpatient psychiatric treatment: PCP  Prior therapy: in the past, unable to recall where  Prior inpatient psychiatric treatment: denies  Prior suicide attempts: denies  Prior self harm: denies  Prior violence or aggression: denies    Past Psychiatric History Update:     Inpatient Psychiatric Admission Since Last Encounter:   no  Changes to Outpatient Psychiatric Treatment Team:    no  Suicide Attempt Or Self Mutilation Since Last Encounter:   no  Incidence of Violent Behavior Since Last Encounter:   no    Traumatic History Update:     New Onset of Abuse Since Last Encounter:   no  Traumatic Events Since Last Encounter:   no    Past Medical History:    Past Medical History:   Diagnosis Date   • ADHD (attention deficit hyperactivity disorder)    • Anxiety    • 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 9)    • Panic attack    • PONV (postoperative nausea and vomiting)    • Prothrombin gene mutation (HCC)     Factor II   • Psoriatic arthritis ([address])    • Sleep difficulties    • SVT (supraventricular tachycardia) (HCC)    • Varicosities of leg         Past Surgical History:   Procedure Laterality Date   • CARDIAC ELECTROPHYSIOLOGY STUDY AND ABLATION      X 2 for SVT   • CARDIAC SURGERY  2017 and 2018    cardiac ablation   • CERVICAL BIOPSY  W/ LOOP ELECTRODE EXCISION  2004   • CHOLECYSTECTOMY     • COLONOSCOPY     • CA TONSILLECTOMY & ADENOIDECTOMY AGE 12/> N/A 12/10/2021    Procedure: TONSILLECTOMY & ADENOIDECTOMY;  Surgeon: Georgina Hernandes MD;  Location: [facility];  Service: ENT   • TONSILLECTOMY  9/2021   • WISDOM TOOTH EXTRACTION       Allergies   Allergen Reactions   • Nickel Hives, Itching and Rash     "Localized"     Substance Abuse History:    Social History     Substance and Sexual Activity   Alcohol Use Not Currently    Comment: rare Socially     Social History     Substance and Sexual Activity   Drug Use Never     Social History:    Social History     Socioeconomic History   • Marital status: Single     Spouse name: Not on file   • Number of children: 1   • Years of education: 2 yr college   • Highest education level: Associate degree: academic program   Occupational History   • Occupation: agency nursing   Tobacco Use   • Smoking status: Former     Packs/day: 0.50     Years: 22.00     Total pack years: 11.00     Types: Cigarettes     Start date: 12     Quit date: 2016     Years since quittin.5   • Smokeless tobacco: Never   Vaping Use   • Vaping Use: Never used   Substance and Sexual Activity   • Alcohol use: Not Currently     Comment: rare Socially   • Drug use: Never   • Sexual activity: Yes     Partners: Male     Birth control/protection: Abstinence, Other   Other Topics Concern   • Not on file   Social History Narrative    · Most recent tobacco use screenin2019      · Do you currently or have you served in the [address] [place] [address] [employer]:   No      · Live alone or with others:   with others      · Are you currently employed: Yes      · Alcohol intake:   Occasional      · Illicit drugs:   none      · Caffeine intake:    Moderate      · Diet:   Regular      · Exercise level:   Occasional      · Education:   2 Year College      · Guns present in home:   No      · Seat belts used routinely:   Yes      · Sexual orientation:   Heterosexual      · Sunscreen used routinely:   Yes      · General stress level:   Medium      Social Determinants of Health     Financial Resource Strain: Not on file   Food Insecurity: Not on file   Transportation Needs: Not on file   Physical Activity: Not on file   Stress: Not on file   Social Connections: Not on file   Intimate Partner Violence: Not on file   Housing Stability: Not on file     Family Psychiatric History:     Family History   Problem Relation Age of Onset   • Hypothyroidism Mother    • Varicose Veins Mother    • Asthma Mother    • Hypothyroidism Father    • Heart disease Father    • Heart attack Father 52   • Emphysema Father    • COPD Father         former smoker   • Heart failure Father         heart attack   • Varicose Veins Sister    • Diabetes Maternal Grandmother    • Psoriasis Paternal Grandmother    • Diabetes Paternal Grandmother    • Anemia Cousin      History Review: The following portions of the patient's history were reviewed and updated as appropriate: allergies, current medications, past family history, past medical history, past social history, past surgical history and problem list     OBJECTIVE:     Vital signs in last 24 hours: There were no vitals filed for this visit. Laboratory Results:   Recent Labs (last 2 months):   No visits with results within 2 Month(s) from this visit. Latest known visit with results is:   Orders Only on 02/03/2023   Component Date Value   • Hemoglobin A1C 02/03/2023 5.5      I have personally reviewed all pertinent laboratory/tests results. Suicide/Homicide Risk Assessment:    Risk of Harm to Self:  The following ratings are based on assessment at the time of the interview  Recent Specific Risk Factors include: current anxiety symptoms  Demographic risk factors include:   Historical Risk Factors include: chronic depressive symptoms, chronic anxiety symptoms, chronic mood disorder  Protective Factors: no current suicidal ideation, access to mental health treatment, being a parent, being , compliant with medications, compliant with mental health treatment, effective coping skills, having a desire to be alive, resiliency, responsibilities and duties to others, stable living environment, stable job, sense of determination, sense of importance of health and wellness, strong relationships, supportive family  Based on today's assessment, Earl Rust presents the following risk of harm to self: low    Risk of Harm to Others:   The following ratings are based on assessment at the time of the interview  Protective Factors: no current homicidal ideation  Based on today's assessment, Earl Rust presents the following risk of harm to others: none    The following interventions are recommended: contracts for safety at present - agrees to go to ED if feeling unsafe, return in 1 month for reassessment, contracts for safety at present - agrees to call Crisis Intervention Service if feeling unsafe    Medications Risks/Benefits:      Risks, Benefits And Possible Side Effects Of Medications:    Discussed risks and benefits of treatment with patient including risk of suicidality, serotonin syndrome, increased QTc interval and SIADH related to treatment with antidepressants; Risk of induction of manic symptoms in certain patient populations, risk of parkinsonian symptoms, metabolic syndrome, tardive dyskinesia and neuroleptic malignant syndrome related to treatment with antipsychotic medications and risk of rash related to treatment with Lamictal     Controlled Medication Discussion:     Kali Bryant has been filling controlled prescriptions on time as prescribed according to [place] Prescription Drug Monitoring Program    Treatment Plan:    Due for update/Updated:   no  Last treatment plan done 3/13/23 by SCOT Alvarado. Treatment Plan due on 9/13/23. SCOT Green 08/04/23    This note was shared with patient.

## 2023-09-08 DIAGNOSIS — F31.81 BIPOLAR 2 DISORDER, MAJOR DEPRESSIVE EPISODE (HCC): ICD-10-CM

## 2023-09-11 RX ORDER — LAMOTRIGINE 25 MG/1
50 TABLET ORAL DAILY
Qty: 60 TABLET | Refills: 0 | Status: SHIPPED | OUTPATIENT
Start: 2023-09-11 | End: 2023-09-15 | Stop reason: DRUGHIGH

## 2023-09-15 ENCOUNTER — TELEMEDICINE (OUTPATIENT)
Dept: PSYCHIATRY | Facility: CLINIC | Age: 43
End: 2023-09-15
Payer: COMMERCIAL

## 2023-09-15 DIAGNOSIS — F31.81 BIPOLAR 2 DISORDER, MAJOR DEPRESSIVE EPISODE (HCC): Primary | ICD-10-CM

## 2023-09-15 PROCEDURE — 99214 OFFICE O/P EST MOD 30 MIN: CPT | Performed by: NURSE PRACTITIONER

## 2023-09-15 RX ORDER — HYDROXYZINE HYDROCHLORIDE 25 MG/1
TABLET, FILM COATED ORAL
Qty: 60 TABLET | Refills: 0 | Status: SHIPPED | OUTPATIENT
Start: 2023-09-15

## 2023-09-15 RX ORDER — LAMOTRIGINE 25 MG/1
75 TABLET ORAL DAILY
Qty: 90 TABLET | Refills: 1 | Status: SHIPPED | OUTPATIENT
Start: 2023-09-15

## 2023-09-19 NOTE — PSYCH
Virtual Regular Visit    Verification of patient location:    Patient is located in the following state in which I hold an active license [place]    Problem List Items Addressed This Visit        Other    Bipolar 2 disorder, major depressive episode ([address]) - Primary    Relevant Medications    lamoTRIgine (LaMICtal) 25 mg tablet    hydrOXYzine HCL (ATARAX) 25 mg tablet          Encounter provider SCOT Daniels    Provider located at    [address] [facility] [place]  [address] [facility] [id number] [phone]    Recent Visits  Date Type Provider Dept   09/15/23 [facility], [address] [place] [address] recent visits within past 7 days and meeting all other requirements  Future Appointments  No visits were found meeting these conditions. Showing future appointments within next 150 days and meeting all other requirements           The patient was identified by name and date of birth. Patient was informed that this is a telemedicine visit and that [facility]. She agrees to proceed. .  My office door was closed. No one else was in the room. She acknowledged consent and understanding of privacy and security of the video platform. The patient has agreed to participate and understands they can discontinue the visit at any time. Patient is aware this is a billable service. HPI     Current Outpatient Medications   Medication Sig Dispense Refill   • albuterol (Ventolin HFA) 90 mcg/act inhaler Inhale 2 puffs every 6 (six) hours as needed for wheezing 18 g 5   • ALPRAZolam (XANAX) 0.5 mg tablet Take 1 tablet (0.5 mg total) by mouth daily as needed for anxiety 60 tablet 0   • Enbrel SureClick 50 MG/ML injection Twice a week     • hydrOXYzine HCL (ATARAX) 25 mg tablet Take 1-2 tablets at night for anxiety and sleep as needed.  60 tablet 0   • lamoTRIgine (LaMICtal) 25 mg tablet Take 3 tablets (75 mg total) by mouth daily 90 tablet 1   • levothyroxine 25 mcg tablet Take 1 tablet (25 mcg total) by mouth daily in the early morning 30 tablet 3   • pantoprazole (PROTONIX) 40 mg tablet Take 40 mg by mouth daily     • ergocalciferol (VITAMIN D2) 50,000 units Take 50,000 Units by mouth once a week Takes on Mondays        No current facility-administered medications for this visit. Review of Systems  Video Exam    There were no vitals filed for this visit. Physical Exam   As a result of this visit, I have referred the patient for further respiratory evaluation. No    I spent 20 minutes directly with the patient during this visit  [facility] acknowledges that she has consented to an online visit or consultation. She understands that the online visit is based solely on information provided by her, and that, in the absence of a face-to-face physical evaluation by the physician, the diagnosis she receives is both limited and provisional in terms of accuracy and completeness. This is not intended to replace a full medical face-to-face evaluation by the physician. Ebony Cheng understands and accepts these terms. MEDICATION MANAGEMENT NOTE        ST. [address] [place] [address]    Name and Date of Birth:  Ebony Cheng 37 y.o. 1980 MRN: [id number]    Date of Visit: September 15, 2023    Allergies   Allergen Reactions   • Nickel Hives, Itching and Rash     "Localized"       Visit Time    Visit Start Time: 8830  Visit Stop Time: 1050  Total Visit Duration: 20 minutes    SUBJECTIVE:    Eneida Santos is seen today for a follow up for Major Depressive Disorder, Generalized Anxiety Disorder and ADHD. She continues to experience ongoing symptoms since the last visit. Eneida Santos seen virtually today for medication management follow up. She was last seen by this provider on 8/4/23.   She reports today that she has had increased anxiety and states that it is likely due to the fact that she has started back at work. She reports she is not sleeping well and is getting approximately 4 hours of sleep per night from 930pm to 2am.  She wakes anxious, worried, and thinking about things, and struggles to go back to sleep. She continues to have irritability. She denies any SI/HI. Denies auditory and visual hallucinations. She rates her anxiety a 6/10, depression 1/10. She admits that she has been using the xanax at times to help with her anxiety. She was encouraged to only use a 1/2 of her tablet to help with the anxiety, or to use the hydroxyzine instead. She voiced understanding. We will increase her lamictal to 75mg PO daily at this time to help with the anxiety and irritability. She denies any side effects from current psychiatric medications. PLAN:  Continue medications as ordered:  Hydroxyzine 25mg PO 1-2 tablets HS PRN  Lamictal 75mg PO daily   Follow up in one month  She will call sooner with concerns or issues if they arise prior to scheduled appointment    Aware of 24 hour and weekend coverage for urgent situations accessed by calling [facility] main practice number  Medication management every 1 month  Aware of need to follow up with family physician for medical issues    Diagnoses and all orders for this visit:    Bipolar 2 disorder, major depressive episode ([address])  -     lamoTRIgine (LaMICtal) 25 mg tablet; Take 3 tablets (75 mg total) by mouth daily  -     hydrOXYzine HCL (ATARAX) 25 mg tablet; Take 1-2 tablets at night for anxiety and sleep as needed.       Current Rating Scores:     Current PHQ-9   PHQ-2/9 Depression Screening    Little interest or pleasure in doing things: 2 - more than half the days  Feeling down, depressed, or hopeless: 0 - not at all  Trouble falling or staying asleep, or sleeping too much: 3 - nearly every day  Feeling tired or having little energy: 1 - several days  Poor appetite or overeating: 0 - not at all  Feeling bad about yourself - or that you are a failure or have let yourself or your family down: 2 - more than half the days  Trouble concentrating on things, such as reading the newspaper or watching television: 1 - several days  Moving or speaking so slowly that other people could have noticed. Or the opposite - being so fidgety or restless that you have been moving around a lot more than usual: 0 - not at all  Thoughts that you would be better off dead, or of hurting yourself in some way: 0 - not at all  PHQ-9 Score: 9   PHQ-9 Interpretation: Mild depression          Current Outpatient Medications on File Prior to Visit   Medication Sig Dispense Refill   • albuterol (Ventolin HFA) 90 mcg/act inhaler Inhale 2 puffs every 6 (six) hours as needed for wheezing 18 g 5   • ALPRAZolam (XANAX) 0.5 mg tablet Take 1 tablet (0.5 mg total) by mouth daily as needed for anxiety 60 tablet 0   • Enbrel SureClick 50 MG/ML injection Twice a week     • levothyroxine 25 mcg tablet Take 1 tablet (25 mcg total) by mouth daily in the early morning 30 tablet 3   • pantoprazole (PROTONIX) 40 mg tablet Take 40 mg by mouth daily     • ergocalciferol (VITAMIN D2) 50,000 units Take 50,000 Units by mouth once a week Takes on Mondays        No current facility-administered medications on file prior to visit. Psychotherapy Provided:     Individual psychotherapy provided: Yes  Counseling was provided during the session today for 16 minutes. Supportive counseling provided. Medication changes discussed with Danie Kaplan. Medication education provided to Danie Kaplan. Recent stressor including ongoing anxiety discussed with Danie Kaplan. Coping strategies reviewed with Danie Kaplan. Importance of medication and treatment compliance reviewed with Danie Kaplan. Importance of follow up with family physician for medical issues reviewed with Danie Kaplan. Reassurance and supportive therapy provided. Crisis/safety plan discussed with Danie Kaplan.  Patient will call prior to scheduled appointment if they have any issues or concerns. Patient understands they can access the office by calling the main number at any time if they are in crisis. They also understand they can call their county's crisis number or go to their nearest ED if suicidal ideation increases or if they develop a plan or intent. HPI ROS Appetite Changes and Sleep:     She reports difficulty falling asleep, frequent awakenings, adequate appetite, normal energy level.  Denies homicidal ideation, denies suicidal ideation    Review Of Systems:    HPI ROS:               Medication Side Effects:  denies     Depression (10 worst): 1/10 (Was 0/10)   Anxiety (10 worst): 6/10 (Was 5/10)   Safety concerns (SI, HI, etc): denies (Was denies)   Sleep: Poor, 4 hours, trouble falling asleep and frequent awakenings (Was improved)   Energy: fair (Was good)   Appetite: good (Was good)   Weight Change: Decreased 10 lbs, weight loss surgery in June       General sleep disturbances   Personality no change in personality   Constitutional as noted in HPI   ENT negative   Cardiovascular negative   Respiratory negative   Gastrointestinal negative   Genitourinary negative   Musculoskeletal negative   Integumentary negative   Neurological negative   Endocrine negative   Other Symptoms none, all other systems are negative     Mental Status Evaluation:    Appearance Appropriately dressed and Good eye contact   Behavior calm and cooperative   Mood anxious  Depression Scale - 1 of 10 (0 = No depression)  Anxiety Scale - 6 of 10 (0 = No anxiety)   Speech Normal rate and volume   Affect mood-congruent   Thought Processes Goal directed and coherent   Thought Content Does not verbalize delusional material   Associations Tightly connected   Perceptual Disturbances Denies hallucinations and does not appear to be responding to internal stimuli   Risk Potential Suicidal/Homicidal Ideation - No evidence of suicidal or homicidal ideation and patient does not verbalize suicidal or homicidal ideation  Risk of Violence - No evidence of risk for violence found on assessment  Risk of Self Mutilation - No evidence of risk for self mutilation found on assessment   Orientation oriented to person, place, time/date and situation   Memory recent and remote memory grossly intact   Consciousness alert and awake   Attention/Concentration attention span and concentration are age appropriate   Insight intact   Judgement intact   Muscle Strength and Gait normal muscle strength and normal muscle tone, normal gait/station and normal balance   Motor Activity no abnormal movements   Language no difficulty naming common objects, no difficulty repeating a phrase, no difficulty writing a sentence   Fund of Knowledge adequate knowledge of current events  adequate fund of knowledge regarding past history  adequate fund of knowledge regarding vocabulary      Past Psychiatric History  Previous diagnoses include ADHD, Anxiety  Prior outpatient psychiatric treatment: PCP  Prior therapy: in the past, unable to recall where  Prior inpatient psychiatric treatment: denies  Prior suicide attempts: denies  Prior self harm: denies  Prior violence or aggression: denies    Past Psychiatric History Update:     Inpatient Psychiatric Admission Since Last Encounter:   no  Changes to Outpatient Psychiatric Treatment Team:    no  Suicide Attempt Or Self Mutilation Since Last Encounter:   no  Incidence of Violent Behavior Since Last Encounter:   no    Traumatic History Update:     New Onset of Abuse Since Last Encounter:   no  Traumatic Events Since Last Encounter:   no    Past Medical History:    Past Medical History:   Diagnosis Date   • ADHD (attention deficit hyperactivity disorder)    • Anxiety    • 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 9)    • Panic attack    • PONV (postoperative nausea and vomiting)    • Prothrombin gene mutation ([facility])     Factor II   • Psoriatic arthritis ([address])    • Sleep difficulties    • SVT (supraventricular tachycardia) ([facility])    • Varicosities of leg         Past Surgical History:   Procedure Laterality Date   • CARDIAC ELECTROPHYSIOLOGY STUDY AND ABLATION      X 2 for SVT   • CARDIAC SURGERY   and     cardiac ablation   • CERVICAL BIOPSY  W/ LOOP ELECTRODE EXCISION     • CHOLECYSTECTOMY     • COLONOSCOPY     • MA TONSILLECTOMY & ADENOIDECTOMY AGE 12/> N/A 12/10/2021    Procedure: TONSILLECTOMY & ADENOIDECTOMY;  Surgeon: Stepan Leiva MD;  Location: [facility];  Service: ENT   • TONSILLECTOMY  2021   • WISDOM TOOTH EXTRACTION       Allergies   Allergen Reactions   • Nickel Hives, Itching and Rash     "Localized"     Substance Abuse History:    Social History     Substance and Sexual Activity   Alcohol Use Not Currently    Comment: rare Socially     Social History     Substance and Sexual Activity   Drug Use Never     Social History:    Social History     Socioeconomic History   • Marital status: Single     Spouse name: Not on file   • Number of children: 1   • Years of education: 2 yr college   • Highest education level: Associate degree: academic program   Occupational History   • Occupation: agency nursing   Tobacco Use   • Smoking status: Former     Packs/day: 0.50     Years: 22.00     Total pack years: 11.00     Types: Cigarettes     Start date:      Quit date: 2016     Years since quittin.7   • Smokeless tobacco: Never   Vaping Use   • Vaping Use: Never used   Substance and Sexual Activity   • Alcohol use: Not Currently     Comment: rare Socially   • Drug use: Never   • Sexual activity: Yes     Partners: Male     Birth control/protection: Abstinence, Other   Other Topics Concern   • Not on file   Social History Narrative    · Most recent tobacco use screenin2019      · Do you currently or have you served in the [address] [place] [address] Street:   No      · Live alone or with others:   with others      · Are you currently employed: Yes      · Alcohol intake:   Occasional      · Illicit drugs:   none      · Caffeine intake: Moderate      · Diet:   Regular      · Exercise level:   Occasional      · Education:   2 Year College      · Guns present in home:   No      · Seat belts used routinely:   Yes      · Sexual orientation:   Heterosexual      · Sunscreen used routinely:   Yes      · General stress level:   Medium      Social Determinants of Health     Financial Resource Strain: Not on file   Food Insecurity: Not on file   Transportation Needs: Not on file   Physical Activity: Not on file   Stress: Not on file   Social Connections: Not on file   Intimate Partner Violence: Not on file   Housing Stability: Not on file     Family Psychiatric History:     Family History   Problem Relation Age of Onset   • Hypothyroidism Mother    • Varicose Veins Mother    • Asthma Mother    • Hypothyroidism Father    • Heart disease Father    • Heart attack Father 52   • Emphysema Father    • COPD Father         former smoker   • Heart failure Father         heart attack   • Varicose Veins Sister    • Diabetes Maternal Grandmother    • Psoriasis Paternal Grandmother    • Diabetes Paternal Grandmother    • Anemia Cousin      History Review: The following portions of the patient's history were reviewed and updated as appropriate: allergies, current medications, past family history, past medical history, past social history, past surgical history and problem list     OBJECTIVE:     Vital signs in last 24 hours: There were no vitals filed for this visit. Laboratory Results:   Recent Labs (last 2 months):   No visits with results within 2 Month(s) from this visit.    Latest known visit with results is:   Orders Only on 02/03/2023   Component Date Value   • Hemoglobin A1C 02/03/2023 5.5      I have personally reviewed all pertinent laboratory/tests results. Suicide/Homicide Risk Assessment:    Risk of Harm to Self:  The following ratings are based on assessment at the time of the interview  Recent Specific Risk Factors include: current anxiety symptoms, unstable mood, feelings of guilt or self blame  Demographic risk factors include:   Historical Risk Factors include: chronic anxiety symptoms, history of mood disorder, history of impulsive behaviors  Protective Factors: no current suicidal ideation, access to mental health treatment, being a parent, being , compliant with medications, compliant with mental health treatment, connection to own children, effective coping skills, effective decision-making skills, good health, good self-esteem, having a desire to be alive, having a sense of purpose or meaning in life, responsibilities and duties to others, restricted access to lethal means, stable living environment, stable job, sense of determination, strong relationships, supportive family  Weapons: none. The following steps have been taken to ensure weapons are properly secured: not applicable  Based on today's assessment, Tnoi Jimenez presents the following risk of harm to self: minimal    Risk of Harm to Others:   The following ratings are based on assessment at the time of the interview  Protective Factors: no current homicidal ideation  Based on today's assessment, Sarahta Barbiet presents the following risk of harm to others: none    The following interventions are recommended: contracts for safety at present - agrees to go to ED if feeling unsafe, return in 1 month for reassessment, contracts for safety at present - agrees to call Crisis Intervention Service if feeling unsafe    Medications Risks/Benefits:      Risks, Benefits And Possible Side Effects Of Medications:    Discussed risks and benefits of treatment with patient including risk of rash related to treatment with Lamictal     Controlled Medication Discussion:     Not applicable    Treatment Plan:    Due for update/Updated:   yes  Last treatment plan done 9/15/23 by SCOT Sanderson. Treatment Plan due on 3/15/24. SCOT Walden 09/19/23    This note was shared with patient.

## 2023-09-19 NOTE — BH TREATMENT PLAN
TREATMENT PLAN (Medication Management Only)        [address] [facility]    Name and Date of Birth:  Umberto Corona 37 y.o. 1980  Date of Treatment Plan: September 15, 2023  Diagnosis/Diagnoses:    1. Bipolar 2 disorder, major depressive episode [facility])      Strengths/Personal Resources for Self-Care: taking medications as prescribed. Area/Areas of need (in own words): anxiety symptoms  1. Long Term Goal: continue improvement in acceptable anxiety level. Target Date:6 months - 3/15/2024  Person/Persons responsible for completion of goal: Joselin  2. Short Term Objective (s) - How will we reach this goal?:   A. Provider new recommended medication/dosage changes and/or continue medication(s): Medication changes: I am also having her increase her lamoTRIgine and start hydrOXYzine HCL. I have discontinued her lexapro. B. Get regular sleep every night . C. Eat a healthy diet . Target Date:6 months - 3/15/2024  Person/Persons Responsible for Completion of Goal: Zola Najjar  Progress Towards Goals: continuing treatment, improving  Treatment Modality: medication management every 1 month, medication education at every visit  Review due 180 days from date of this plan: 6 months - 3/15/2024  Expected length of service: ongoing treatment  My Physician/PA/NP and I have developed this plan together and I agree to work on the goals and objectives. I understand the treatment goals that were developed for my treatment.

## 2023-09-19 NOTE — PSYCH
Regular Visit    Problem List Items Addressed This Visit        Other    Bipolar 2 disorder, major depressive episode ([address]) - Primary    Relevant Medications    lamoTRIgine (LaMICtal) 25 mg tablet    hydrOXYzine HCL (ATARAX) 25 mg tablet          Encounter provider SCOT Asencio    Provider located at    [address] [facility]  [address] [facility] [address] [phone]    Recent Visits  Date Type Provider Dept   09/15/23 [facility] [address] recent visits within past 7 days and meeting all other requirements  Future Appointments  No visits were found meeting these conditions. Showing future appointments within next 150 days and meeting all other requirements       HPI     Current Outpatient Medications   Medication Sig Dispense Refill   • albuterol (Ventolin HFA) 90 mcg/act inhaler Inhale 2 puffs every 6 (six) hours as needed for wheezing 18 g 5   • ALPRAZolam (XANAX) 0.5 mg tablet Take 1 tablet (0.5 mg total) by mouth daily as needed for anxiety 60 tablet 0   • Enbrel SureClick 50 MG/ML injection Twice a week     • hydrOXYzine HCL (ATARAX) 25 mg tablet Take 1-2 tablets at night for anxiety and sleep as needed. 60 tablet 0   • lamoTRIgine (LaMICtal) 25 mg tablet Take 3 tablets (75 mg total) by mouth daily 90 tablet 1   • levothyroxine 25 mcg tablet Take 1 tablet (25 mcg total) by mouth daily in the early morning 30 tablet 3   • pantoprazole (PROTONIX) 40 mg tablet Take 40 mg by mouth daily     • ergocalciferol (VITAMIN D2) 50,000 units Take 50,000 Units by mouth once a week Takes on Mondays        No current facility-administered medications for this visit. Review of Systems    I spent 30 minutes directly with the patient during this visit      [address]    Name and Date of Birth:  Jose Gave 37 y.o. 1980 MRN: [id number]    Date of Visit: May 15, 2023    Allergies   Allergen Reactions   • Nickel Hives, Itching and Rash     "Localized"       Visit Time    Visit Start Time: 7965  Visit Stop Time: 1130  Total Visit Duration: 30 minutes    SUBJECTIVE:    Govind Mercado is seen today for a follow up for Major Depressive Disorder, Generalized Anxiety Disorder and ADHD. She continues to experience on and off anxiety symptoms since the last visit. Govind Mercado seen virtually today for medication management follow up. She was last seen by this provider on 4/14/23. She states that she woke up feeling sick today so she was unsure that she was going to be able to make the appointment, so she switched to virtual instead of in person. She reports that she will start taking the increase of enbrel this week, as she just received the medication due to insurance issues. She is hopeful that it helps with her RA. She states that her sleep is still "not good". She continues to have difficulty getting to sleep at night. We discussed sleep hygiene. 1) Get up at the same time seven days out of the week; 2) Only go to bed when feeling sleepy; 3) Wind down in the evening without electronics; 4) Stimulus Control: If lying in bed for 15-20 minutes (estimated because the clock is turned away so you cannot see it) and you are not asleep get up and do something relaxing in a different room (reading a magazine article, solitaire with a deck of cards). Do this in the middle of the night as well if awake. Avoid doing work or getting on the computer. ; 5) Bedroom for sleep only. No watching TV or using electronics (computer, phone, tablet etc.)  in bed; 6) Turn clock away so you cannot see it in bed; 7) Exercise regularly but try to avoid exercise within 4 hours of bedtime. Morning exercise is best; 8) Avoid caffeine in the afternoon.   Considering tapering down on caffeine by decreasing by one beverage with caffeine every 3 days until off.; 9) Avoid smoking near bedtime. She reports that she has been "on edge" recently. She rates her anxiety a 7-8/10. She rates her depression 1-2/10. She denies SI/HI. Denies auditory or visual hallucinations. She is otherwise calm and appropriate in conversation, pleasant and friendly. We discussed other options for her sleep. She has been on Lexapro for a long time and it was effective for her depression. She trialed trazodone, clonidine, gabapentin, remeron, for sleep, which were not effective. We will now initiate 25mg of seroquel to try to help with her depression/anxiety/sleep at this time. She is agreeable to this treatment plan at this time. She denies any side effects from current psychiatric medications. PLAN:  Continue medications as ordered: Adderall XR 10mg PO daily   Lexapro 20mg PO daily  Atarax 25mg PO daily PRN for anxiety  Initiate Seroquel 25mg PO HS  Follow up in one month  She will call sooner with concerns or issues if they arise prior to scheduled appointment     Aware of 24 hour and weekend coverage for urgent situations accessed by calling [facility] main practice number  Medication management every 1 month  Aware of need to follow up with family physician for medical issues    Diagnoses and all orders for this visit:    Bipolar 2 disorder, major depressive episode ([address] [facility])  -     lamoTRIgine (LaMICtal) 25 mg tablet; Take 3 tablets (75 mg total) by mouth daily  -     hydrOXYzine HCL (ATARAX) 25 mg tablet; Take 1-2 tablets at night for anxiety and sleep as needed.           Current Outpatient Medications on File Prior to Visit   Medication Sig Dispense Refill   • albuterol (Ventolin HFA) 90 mcg/act inhaler Inhale 2 puffs every 6 (six) hours as needed for wheezing 18 g 5   • ALPRAZolam (XANAX) 0.5 mg tablet Take 1 tablet (0.5 mg total) by mouth daily as needed for anxiety 60 tablet 0   • Enbrel SureClick 50 MG/ML injection Twice a week     • levothyroxine 25 mcg tablet Take 1 tablet (25 mcg total) by mouth daily in the early morning 30 tablet 3   • pantoprazole (PROTONIX) 40 mg tablet Take 40 mg by mouth daily     • ergocalciferol (VITAMIN D2) 50,000 units Take 50,000 Units by mouth once a week Takes on Mondays        No current facility-administered medications on file prior to visit. Psychotherapy Provided:     Individual psychotherapy provided: Yes  Counseling was provided during the session today for 16 minutes. Supportive counseling provided. Medication changes discussed with Govind Mercado. Medication education provided to Govind Mercado. Recent stressor including chronic anxiety discussed with Govind Mercado. Coping strategies reviewed with Govind Mercado. Importance of medication and treatment compliance reviewed with Govind Mercado. Importance of follow up with family physician for medical issues reviewed with Govind Mercado. Reassurance and supportive therapy provided. Crisis/safety plan discussed with Govind Mercado. Patient will call prior to scheduled appointment if they have any issues or concerns. Patient understands they can access the office by calling the main number at any time if they are in crisis. They also understand they can call their [facility]'s crisis number or go to their nearest ED if suicidal ideation increases or if they develop a plan or intent. HPI ROS Appetite Change  s and Sleep:     She reports difficulty falling asleep, adequate appetite, adequate energy level.  Denies homicidal ideation, denies suicidal ideation    Review Of Systems:  HPI ROS:               Medication Side Effects:  denies     Depression (10 worst): 1-2/10 (Was 0/10)   Anxiety (10 worst): 7-8/10 (Was 5/10)   Safety concerns (SI, HI, etc): denies (Was denies)   Sleep: Trouble falling asleep (Was trouble falling and staying asleep)   Energy: fair (Was fair)   Appetite: good (Was good)     General sleep disturbances   Personality no change in personality   Constitutional as noted in HPI   ENT negative   Cardiovascular negative   Respiratory negative   Gastrointestinal negative   Genitourinary negative   Musculoskeletal negative   Integumentary negative   Neurological negative   Endocrine negative   Other Symptoms RA being treated by PCP, all other systems are negative     Mental Status Evaluation:    Appearance Adequate hygiene and grooming and Good eye contact   Behavior calm and cooperative   Mood anxious  Depression Scale - 1-2 of 10 (0 = No depression)  Anxiety Scale - 7-8 of 10 (0 = No anxiety)   Speech Normal rate and volume   Affect mood-congruent   Thought Processes Goal directed and coherent   Thought Content Does not verbalize delusional material   Associations Tightly connected   Perceptual Disturbances Denies hallucinations and does not appear to be responding to internal stimuli   Risk Potential Suicidal/Homicidal Ideation - No evidence of suicidal or homicidal ideation and patient does not verbalize suicidal or homicidal ideation  Risk of Violence - No evidence of risk for violence found on assessment  Risk of Self Mutilation - Homicidal Ideations none   Orientation oriented to person, place, time/date and situation   Memory recent and remote memory grossly intact   Consciousness alert and awake   Attention/Concentration attention span and concentration are age appropriate   Insight fair   Judgement fair   Muscle Strength and Gait normal muscle strength and normal muscle tone, normal gait/station and normal balance   Motor Activity no abnormal movements   Language no difficulty naming common objects, no difficulty repeating a phrase, no difficulty writing a sentence   Fund of Knowledge adequate knowledge of current events  adequate fund of knowledge regarding past history  adequate fund of knowledge regarding vocabulary      Past Psychiatric History  Previous diagnoses include ADHD, Anxiety  Prior outpatient psychiatric treatment: PCP  Prior therapy: in the past, unable to recall where  Prior inpatient psychiatric treatment: denies  Prior suicide attempts: denies  Prior self harm: denies  Prior violence or aggression: denies    Past Psychiatric History Update:     Inpatient Psychiatric Admission Since Last Encounter:   no  Changes to Outpatient Psychiatric Treatment Team:    no  Suicide Attempt Or Self Mutilation Since Last Encounter:   no  Incidence of Violent Behavior Since Last Encounter:   no    Traumatic History Update:     New Onset of Abuse Since Last Encounter:   no  Traumatic Events Since Last Encounter:   no    Past Medical History:    Past Medical History:   Diagnosis Date   • ADHD (attention deficit hyperactivity disorder)    • Anxiety    • 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 9)    • Panic attack    • PONV (postoperative nausea and vomiting)    • Prothrombin gene mutation (HCC)     Factor II   • Psoriatic arthritis ([address])    • Sleep difficulties    • SVT (supraventricular tachycardia) (HCC)    • Varicosities of leg         Past Surgical History:   Procedure Laterality Date   • CARDIAC ELECTROPHYSIOLOGY STUDY AND ABLATION      X 2 for SVT   • CARDIAC SURGERY  2017 and 2018    cardiac ablation   • CERVICAL BIOPSY  W/ LOOP ELECTRODE EXCISION  2004   • CHOLECYSTECTOMY     • COLONOSCOPY     • AL TONSILLECTOMY & ADENOIDECTOMY AGE 12/> N/A 12/10/2021    Procedure: TONSILLECTOMY & ADENOIDECTOMY;  Surgeon: Susy Shen MD;  Location: [facility];  Service: ENT   • TONSILLECTOMY  9/2021   • WISDOM TOOTH EXTRACTION       Allergies   Allergen Reactions   • Nickel Hives, Itching and Rash     "Localized"     Substance Abuse History:    Social History     Substance and Sexual Activity   Alcohol Use Not Currently    Comment: rare Socially     Social History     Substance and Sexual Activity   Drug Use Never     Social History:    Social History     Socioeconomic History   • Marital status: Single     Spouse name: Not on file   • Number of children: 1   • Years of education: 2 yr college   • Highest education level: Associate degree: academic program   Occupational History   • Occupation: agency nursing   Tobacco Use   • Smoking status: Former     Packs/day: 0.50     Years: 22.00     Total pack years: 11.00     Types: Cigarettes     Start date: 12     Quit date: 2016     Years since quittin.7   • Smokeless tobacco: Never   Vaping Use   • Vaping Use: Never used   Substance and Sexual Activity   • Alcohol use: Not Currently     Comment: rare Socially   • Drug use: Never   • Sexual activity: Yes     Partners: Male     Birth control/protection: Abstinence, Other   Other Topics Concern   • Not on file   Social History Narrative    · Most recent tobacco use screenin2019      · Do you currently or have you served in the [address] [place] [address] [employer]:   No      · Live alone or with others:   with others      · Are you currently employed: Yes      · Alcohol intake:   Occasional      · Illicit drugs:   none      · Caffeine intake:    Moderate      · Diet:   Regular      · Exercise level:   Occasional      · Education:   2 Year College      · Guns present in home:   No      · Seat belts used routinely:   Yes      · Sexual orientation:   Heterosexual      · Sunscreen used routinely:   Yes      · General stress level:   Medium      Social Determinants of Health     Financial Resource Strain: Not on file   Food Insecurity: Not on file   Transportation Needs: Not on file   Physical Activity: Not on file   Stress: Not on file   Social Connections: Not on file   Intimate Partner Violence: Not on file   Housing Stability: Not on file     Family Psychiatric History:     Family History   Problem Relation Age of Onset   • Hypothyroidism Mother    • Varicose Veins Mother    • Asthma Mother    • Hypothyroidism Father    • Heart disease Father    • Heart attack Father 52   • Emphysema Father    • COPD Father         former smoker   • Heart failure Father         heart attack   • Varicose Veins Sister    • Diabetes Maternal Grandmother    • Psoriasis Paternal Grandmother    • Diabetes Paternal Grandmother    • Anemia Cousin      History Review: The following portions of the patient's history were reviewed and updated as appropriate: allergies, current medications, past family history, past medical history, past social history, past surgical history and problem list     OBJECTIVE:     Vital signs in last 24 hours: There were no vitals filed for this visit. Laboratory Results:   Recent Labs (last 2 months):   No visits with results within 2 Month(s) from this visit. Latest known visit with results is:   Orders Only on 02/03/2023   Component Date Value   • Hemoglobin A1C 02/03/2023 5.5      I have personally reviewed all pertinent laboratory/tests results.     Suicide/Homicide Risk Assessment:    Risk of Harm to Self:  • The following ratings are based on assessment at the time of the interview  • Recent Specific Risk Factors include: current depressive symptoms, current anxiety symptoms  • Demographic risk factors include:   • Historical Risk Factors include: chronic depressive symptoms, chronic anxiety symptoms  • Protective Factors: no current suicidal ideation, access to mental health treatment, being a parent, being , compliant with medications, compliant with mental health treatment, effective coping skills, having a desire to be alive, resiliency, responsibilities and duties to others, stable living environment, stable job, sense of determination, sense of importance of health and wellness, strong relationships, supportive family  • Based on today's assessment, Maria T Parks presents the following risk of harm to self: low    Risk of Harm to Others:  • The following ratings are based on assessment at the time of the interview  • Protective Factors: no current homicidal ideation  • Based on today's assessment, Ancelmo presents the following risk of harm to others: none    The following interventions are recommended: contracts for safety at present - agrees to go to ED if feeling unsafe, return in 1 month for reassessment, contracts for safety at present - agrees to call Crisis Intervention Service if feeling unsafe    Medications Risks/Benefits:      Risks, Benefits And Possible Side Effects Of Medications:    Discussed risks and benefits of treatment with patient including risk of suicidality, serotonin syndrome, increased QTc interval and SIADH related to treatment with antidepressants; Risk of induction of manic symptoms in certain patient populations, risk of parkinsonian symptoms, metabolic syndrome, tardive dyskinesia and neuroleptic malignant syndrome related to treatment with antipsychotic medications and risks of cardiovascular side effects including elevated blood pressure, risk of misuse, abuse or dependence and risk of increased anxiety related to treatment with stimulant medications     Controlled Medication Discussion:     Ancelmo has been filling controlled prescriptions on time as prescribed according to  [facility] Monitoring Program    Treatment Plan:    Due for update/Updated:   no  Last treatment plan done 3/13/23 by SCOT Hale. Treatment Plan due on 9/13/23. SCOT Rosario 09/19/23    This note was shared with patient.

## 2023-10-13 ENCOUNTER — TELEMEDICINE (OUTPATIENT)
Dept: PSYCHIATRY | Facility: CLINIC | Age: 43
End: 2023-10-13
Payer: COMMERCIAL

## 2023-10-13 DIAGNOSIS — F31.81 BIPOLAR 2 DISORDER, MAJOR DEPRESSIVE EPISODE (HCC): Primary | ICD-10-CM

## 2023-10-13 PROCEDURE — 99214 OFFICE O/P EST MOD 30 MIN: CPT | Performed by: NURSE PRACTITIONER

## 2023-10-13 RX ORDER — ARIPIPRAZOLE 2 MG/1
2 TABLET ORAL DAILY
Qty: 30 TABLET | Refills: 0 | Status: SHIPPED | OUTPATIENT
Start: 2023-10-13

## 2023-10-13 RX ORDER — LAMOTRIGINE 25 MG/1
50 TABLET ORAL DAILY
Qty: 60 TABLET | Refills: 2 | Status: SHIPPED | OUTPATIENT
Start: 2023-10-13

## 2023-10-16 NOTE — PSYCH
Virtual Regular Visit    Verification of patient location:    Patient is located in the following state in which I hold an active license [place]    Problem List Items Addressed This Visit     Bipolar 2 disorder, major depressive episode ([address]) - Primary    Relevant Medications    lamoTRIgine (LaMICtal) 25 mg tablet    ARIPiprazole (ABILIFY) 2 mg tablet          Encounter provider SCOT Munson    Provider located at    [address] [facility]  [address] [facility] [id number] [phone]    Recent Visits  Date Type Provider Dept   10/13/23 [address] [facility] JOHN PAUL Pappas, [address] [place] [address]   Showing recent visits within past 7 days and meeting all other requirements  Future Appointments  No visits were found meeting these conditions. Showing future appointments within next 150 days and meeting all other requirements           The patient was identified by name and date of birth. Patient was informed that this is a telemedicine visit and that [facility] [employer]. She agrees to proceed. .  My office door was closed. SCOT Diaz student was also in the room during appointment with patient consent. She acknowledged consent and understanding of privacy and security of the video platform. The patient has agreed to participate and understands they can discontinue the visit at any time. Patient is aware this is a billable service. HPI     Current Outpatient Medications   Medication Sig Dispense Refill   • ALPRAZolam (XANAX) 0.5 mg tablet Take 1 tablet (0.5 mg total) by mouth daily as needed for anxiety 60 tablet 0   • ARIPiprazole (ABILIFY) 2 mg tablet Take 1 tablet (2 mg total) by mouth daily 30 tablet 0   • Enbrel SureClick 50 MG/ML injection Twice a week     • hydrOXYzine HCL (ATARAX) 25 mg tablet Take 1-2 tablets at night for anxiety and sleep as needed.  60 tablet 0   • lamoTRIgine (LaMICtal) 25 mg tablet Take 2 tablets (50 mg total) by mouth daily 60 tablet 2   • levothyroxine 25 mcg tablet Take 1 tablet (25 mcg total) by mouth daily in the early morning 30 tablet 3   • pantoprazole (PROTONIX) 40 mg tablet Take 40 mg by mouth daily     • albuterol (Ventolin HFA) 90 mcg/act inhaler Inhale 2 puffs every 6 (six) hours as needed for wheezing 18 g 5   • ergocalciferol (VITAMIN D2) 50,000 units Take 50,000 Units by mouth once a week Takes on Mondays        No current facility-administered medications for this visit. Review of Systems  Video Exam    There were no vitals filed for this visit. Physical Exam   As a result of this visit, I have referred the patient for further respiratory evaluation. No    I spent 20 minutes directly with the patient during this visit  [address] [place], [place] [address]wy acknowledges that she has consented to an online visit or consultation. She understands that the online visit is based solely on information provided by her, and that, in the absence of a face-to-face physical evaluation by the physician, the diagnosis she receives is both limited and provisional in terms of accuracy and completeness. This is not intended to replace a full medical face-to-face evaluation by the physician. Ron Vivar understands and accepts these terms. MEDICATION MANAGEMENT NOTE        [facility]    Name and Date of Birth:  Ron Vivar 37 y.o. 1980 MRN: [id number]    Date of Visit: October 13, 2023    Allergies   Allergen Reactions   • Nickel Hives, Itching and Rash     "Localized"       Visit Time    Visit Start Time: 2309  Visit Stop Time: 1050  Total Visit Duration:  20 minutes    SUBJECTIVE:    Melissa Melendez is seen today for a follow up for Major Depressive Disorder, Generalized Anxiety Disorder and ADHD. She continues to experience ongoing symptoms since the last visit.  Melissa Melendez seen virtually today for medication management follow up. She was last seen by this provider on 9/15/23. She reports that the increase in lamictal was causing her to have brain fog and increased agitation so she decreased the dose to 50mg PO daily on her own and feels better. She states that she continues to have increased anxiety and has a hard time falling asleep at night due to inability to get comfortable. She appears calm during conversation. She is cooperative. She denies SI/HI. Denies auditory and visual hallucinations. She rates her depression 2/10, anxiety 7/10. Due to her intolerance to the increased dose of lamictal, we will initiate abilify 2mg PO daily to help with her anxiety/irritability and ongoing depression. Patient education for Abilify completed including dizziness, sedation, GI distress, Akathisia, agitation, orthostatic hypotension, headache, and cardiovascular risks, metabolic syndrome, NMS, TD, EPS, Seizures, and others. She verbalized understanding. We will start with 2mg PO daily and continue to assess at subsequent appointments. She will follow up in one month. She denies any side effects from current psychiatric medications. PLAN:  Continue medications as ordered:  Hydroxyzine 25mg PO 1-2 tablets HS PRN  Decrease Lamictal 50mg PO daily   Initiate Abilify 2mg PO daily  Follow up in one month  She will call sooner with concerns or issues if they arise prior to scheduled appointment    Aware of 24 hour and weekend coverage for urgent situations accessed by calling [facility] main practice number  Medication management every 1 month  Aware of need to follow up with family physician for medical issues    Diagnoses and all orders for this visit:    Bipolar 2 disorder, major depressive episode (HCC)  -     lamoTRIgine (LaMICtal) 25 mg tablet; Take 2 tablets (50 mg total) by mouth daily  -     ARIPiprazole (ABILIFY) 2 mg tablet;  Take 1 tablet (2 mg total) by mouth daily      Current Rating Scores:     Current PHQ-9   PHQ-2/9 Depression Screening    Little interest or pleasure in doing things: 1 - several days  Feeling down, depressed, or hopeless: 0 - not at all  Trouble falling or staying asleep, or sleeping too much: 2 - more than half the days  Feeling tired or having little energy: 2 - more than half the days  Poor appetite or overeatin - not at all  Feeling bad about yourself - or that you are a failure or have let yourself or your family down: 1 - several days  Trouble concentrating on things, such as reading the newspaper or watching television: 1 - several days  Moving or speaking so slowly that other people could have noticed. Or the opposite - being so fidgety or restless that you have been moving around a lot more than usual: 0 - not at all  Thoughts that you would be better off dead, or of hurting yourself in some way: 0 - not at all  PHQ-9 Score: 7   PHQ-9 Interpretation: Mild depression          Current Outpatient Medications on File Prior to Visit   Medication Sig Dispense Refill   • ALPRAZolam (XANAX) 0.5 mg tablet Take 1 tablet (0.5 mg total) by mouth daily as needed for anxiety 60 tablet 0   • Enbrel SureClick 50 MG/ML injection Twice a week     • hydrOXYzine HCL (ATARAX) 25 mg tablet Take 1-2 tablets at night for anxiety and sleep as needed. 60 tablet 0   • levothyroxine 25 mcg tablet Take 1 tablet (25 mcg total) by mouth daily in the early morning 30 tablet 3   • pantoprazole (PROTONIX) 40 mg tablet Take 40 mg by mouth daily     • albuterol (Ventolin HFA) 90 mcg/act inhaler Inhale 2 puffs every 6 (six) hours as needed for wheezing 18 g 5   • ergocalciferol (VITAMIN D2) 50,000 units Take 50,000 Units by mouth once a week Takes on         No current facility-administered medications on file prior to visit. Psychotherapy Provided:     Individual psychotherapy provided: Yes  Counseling was provided during the session today for 16 minutes.   Supportive counseling provided. Medication changes discussed with Eilene Goltz. Medication education provided to Eilene Goltz. Recent stressor including ongoing anxiety discussed with Eilene Goltz. Coping strategies reviewed with Eilene Goltz. Importance of medication and treatment compliance reviewed with Eilene Goltz. Importance of follow up with family physician for medical issues reviewed with Eilene Goltz. Reassurance and supportive therapy provided. Crisis/safety plan discussed with Eilene Goltz. Patient will call prior to scheduled appointment if they have any issues or concerns. Patient understands they can access the office by calling the main number at any time if they are in crisis. They also understand they can call their [facility]'s crisis number or go to their nearest ED if suicidal ideation increases or if they develop a plan or intent. HPI ROS Appetite Changes and Sleep:     She reports difficulty falling asleep, frequent awakenings, adequate appetite, normal energy level.  Denies homicidal ideation, denies suicidal ideation    Review Of Systems:    HPI ROS:               Medication Side Effects:   denies   Depression (10 worst): 2/10 1/10   Anxiety (10 worst): 7/10 6/10   Safety concerns (SI, HI, etc): denies denies   Sleep: Poor, trouble falling asleep and trouble staying asleep Poor, 4 hours, trouble falling asleep and frequent awakenings   Energy: fair fair   Appetite: good good   Weight Change:  Decreased 10 lbs, weight loss surgery in June      General sleep disturbances   Personality no change in personality   Constitutional as noted in HPI   ENT negative   Cardiovascular negative   Respiratory negative   Gastrointestinal negative   Genitourinary negative   Musculoskeletal negative   Integumentary negative   Neurological negative   Endocrine negative   Other Symptoms none, all other systems are negative     Mental Status Evaluation:    Appearance Appropriately dressed and Good eye contact   Behavior calm and cooperative   Mood anxious  Depression Scale - 2 of 10 (0 = No depression)  Anxiety Scale - 7 of 10 (0 = No anxiety)   Speech Normal rate and volume   Affect mood-congruent   Thought Processes Goal directed and coherent   Thought Content Does not verbalize delusional material   Associations Tightly connected   Perceptual Disturbances Denies hallucinations and does not appear to be responding to internal stimuli   Risk Potential Suicidal/Homicidal Ideation - No evidence of suicidal or homicidal ideation and patient does not verbalize suicidal or homicidal ideation  Risk of Violence - No evidence of risk for violence found on assessment  Risk of Self Mutilation - No evidence of risk for self mutilation found on assessment   Orientation oriented to person, place, time/date and situation   Memory recent and remote memory grossly intact   Consciousness alert and awake   Attention/Concentration attention span and concentration are age appropriate   Insight intact   Judgement intact   Muscle Strength and Gait normal muscle strength and normal muscle tone, normal gait/station and normal balance   Motor Activity no abnormal movements   Language no difficulty naming common objects, no difficulty repeating a phrase, no difficulty writing a sentence   Fund of Knowledge adequate knowledge of current events  adequate fund of knowledge regarding past history  adequate fund of knowledge regarding vocabulary      Past Psychiatric History  Previous diagnoses include ADHD, Anxiety  Prior outpatient psychiatric treatment: PCP  Prior therapy: in the past, unable to recall where  Prior inpatient psychiatric treatment: denies  Prior suicide attempts: denies  Prior self harm: denies  Prior violence or aggression: denies    Past Psychiatric History Update:     Inpatient Psychiatric Admission Since Last Encounter:   no  Changes to Outpatient Psychiatric Treatment Team:    no  Suicide Attempt Or Self Mutilation Since Last Encounter:   no  Incidence of Violent Behavior Since Last Encounter:   no    Traumatic History Update:     New Onset of Abuse Since Last Encounter:   no  Traumatic Events Since Last Encounter:   no    Past Medical History:    Past Medical History:   Diagnosis Date   • ADHD (attention deficit hyperactivity disorder)    • Anxiety    • 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 9)    • Panic attack    • PONV (postoperative nausea and vomiting)    • Prothrombin gene mutation (HCC)     Factor II   • Psoriatic arthritis ([address])    • Sleep difficulties    • SVT (supraventricular tachycardia) (HCC)    • Varicosities of leg         Past Surgical History:   Procedure Laterality Date   • CARDIAC ELECTROPHYSIOLOGY STUDY AND ABLATION      X 2 for SVT   • CARDIAC SURGERY  2017 and 2018    cardiac ablation   • CERVICAL BIOPSY  W/ LOOP ELECTRODE EXCISION  2004   • CHOLECYSTECTOMY     • COLONOSCOPY     • VA TONSILLECTOMY & ADENOIDECTOMY AGE 12/> N/A 12/10/2021    Procedure: TONSILLECTOMY & ADENOIDECTOMY;  Surgeon: Willem Chin MD;  Location: [facility];  Service: ENT   • TONSILLECTOMY  9/2021   • WISDOM TOOTH EXTRACTION       Allergies   Allergen Reactions   • Nickel Hives, Itching and Rash     "Localized"     Substance Abuse History:    Social History     Substance and Sexual Activity   Alcohol Use Not Currently    Comment: rare Socially     Social History     Substance and Sexual Activity   Drug Use Never     Social History:    Social History     Socioeconomic History   • Marital status: Single     Spouse name: Not on file   • Number of children: 1   • Years of education: 2 yr college   • Highest education level: Associate degree: academic program   Occupational History   • Occupation: agency nursing   Tobacco Use   • Smoking status: Former     Packs/day: 0.50     Years: 22.00     Total pack years: 11.00     Types: Cigarettes     Start date: 12     Quit date: 2016     Years since quittin.7   • Smokeless tobacco: Never   Vaping Use   • Vaping Use: Never used   Substance and Sexual Activity   • Alcohol use: Not Currently     Comment: rare Socially   • Drug use: Never   • Sexual activity: Yes     Partners: Male     Birth control/protection: Abstinence, Other   Other Topics Concern   • Not on file   Social History Narrative    · Most recent tobacco use screenin2019      · Do you currently or have you served in [employer] [address] [place] [address] [employer]:   No      · Live alone or with others:   with others      · Are you currently employed: Yes      · Alcohol intake:   Occasional      · Illicit drugs:   none      · Caffeine intake: Moderate      · Diet:   Regular      · Exercise level:   Occasional      · Education:   2 Year College      · Guns present in home:   No      · Seat belts used routinely:   Yes      · Sexual orientation:   Heterosexual      · Sunscreen used routinely:   Yes      · General stress level:   Medium      Social Determinants of Health     Financial Resource Strain: Not on file   Food Insecurity: Not on file   Transportation Needs: Not on file   Physical Activity: Not on file   Stress: Not on file   Social Connections: Not on file   Intimate Partner Violence: Not on file   Housing Stability: Not on file     Family Psychiatric History:     Family History   Problem Relation Age of Onset   • Hypothyroidism Mother    • Varicose Veins Mother    • Asthma Mother    • Hypothyroidism Father    • Heart disease Father    • Heart attack Father 52   • Emphysema Father    • COPD Father         former smoker   • Heart failure Father         heart attack   • Varicose Veins Sister    • Diabetes Maternal Grandmother    • Psoriasis Paternal Grandmother    • Diabetes Paternal Grandmother    • Anemia Cousin      History Review: The following portions of the patient's history were reviewed and updated as appropriate: allergies, current medications, past family history, past medical history, past social history, past surgical history and problem list     OBJECTIVE:     Vital signs in last 24 hours: There were no vitals filed for this visit. Laboratory Results:   Recent Labs (last 2 months):   No visits with results within 2 Month(s) from this visit. Latest known visit with results is:   Orders Only on 02/03/2023   Component Date Value   • Hemoglobin A1C 02/03/2023 5.5      I have personally reviewed all pertinent laboratory/tests results. Suicide/Homicide Risk Assessment:    Risk of Harm to Self:  The following ratings are based on assessment at the time of the interview  Recent Specific Risk Factors include: current anxiety symptoms, unstable mood, feelings of guilt or self blame  Demographic risk factors include:   Historical Risk Factors include: chronic anxiety symptoms, history of mood disorder, history of impulsive behaviors  Protective Factors: no current suicidal ideation, access to mental health treatment, being a parent, being , compliant with medications, compliant with mental health treatment, connection to own children, effective coping skills, effective decision-making skills, good health, good self-esteem, having a desire to be alive, having a sense of purpose or meaning in life, responsibilities and duties to others, restricted access to lethal means, stable living environment, stable job, sense of determination, strong relationships, supportive family  Weapons: none. The following steps have been taken to ensure weapons are properly secured: not applicable  Based on today's assessment, Kunal Cochran presents the following risk of harm to self: minimal    Risk of Harm to Others:   The following ratings are based on assessment at the time of the interview  Protective Factors: no current homicidal ideation  Based on today's assessment, Eagle Banks presents the following risk of harm to others: none    The following interventions are recommended: contracts for safety at present - agrees to go to ED if feeling unsafe, return in 1 month for reassessment, contracts for safety at present - agrees to call Crisis Intervention Service if feeling unsafe    Medications Risks/Benefits:      Risks, Benefits And Possible Side Effects Of Medications:    Discussed risks and benefits of treatment with patient including risk of parkinsonian symptoms, metabolic syndrome, tardive dyskinesia and neuroleptic malignant syndrome related to treatment with antipsychotic medications and risk of rash related to treatment with Lamictal     Controlled Medication Discussion:     Not applicable    Treatment Plan:    Due for update/Updated:   no  Last treatment plan done 9/15/23 by SCOT Nicholas. Treatment Plan due on 3/15/24. SCOT Modi 10/16/23    This note was shared with patient.

## 2023-11-10 ENCOUNTER — TELEMEDICINE (OUTPATIENT)
Dept: PSYCHIATRY | Facility: CLINIC | Age: 43
End: 2023-11-10
Payer: COMMERCIAL

## 2023-11-10 DIAGNOSIS — F31.81 BIPOLAR 2 DISORDER, MAJOR DEPRESSIVE EPISODE (HCC): Primary | ICD-10-CM

## 2023-11-10 PROCEDURE — 99213 OFFICE O/P EST LOW 20 MIN: CPT | Performed by: NURSE PRACTITIONER

## 2023-11-10 RX ORDER — ARIPIPRAZOLE 5 MG/1
5 TABLET ORAL DAILY
Qty: 30 TABLET | Refills: 0 | Status: SHIPPED | OUTPATIENT
Start: 2023-11-10 | End: 2023-11-15

## 2023-11-10 NOTE — PSYCH
Virtual Regular Visit    Verification of patient location:    Patient is located in the following state in which I hold an active license [place]    Problem List Items Addressed This Visit     Bipolar 2 disorder, major depressive episode ([address]) - Primary    Relevant Medications    ARIPiprazole (ABILIFY) 5 mg tablet          Encounter provider SCOT Wallace    Provider located at    [address] [facility]  [address] [facility] [id number] [phone]    Recent Visits  No visits were found meeting these conditions. Showing recent visits within past 7 days and meeting all other requirements  Today's Visits  Date Type Provider Dept   11/10/23 Telemedicine Serenity Ontiveros, [address]  today's visits and meeting all other requirements  Future Appointments  No visits were found meeting these conditions. Showing future appointments within next 150 days and meeting all other requirements           The patient was identified by name and date of birth. Patient was informed that this is a telemedicine visit and [facility] She agrees to proceed. .  My office door was closed. No one else was in the room. She acknowledged consent and understanding of privacy and security of the video platform. The patient has agreed to participate and understands they can discontinue the visit at any time. Patient is aware this is a billable service.      HPI     Current Outpatient Medications   Medication Sig Dispense Refill   • ARIPiprazole (ABILIFY) 5 mg tablet Take 1 tablet (5 mg total) by mouth daily 30 tablet 0   • Enbrel SureClick 50 MG/ML injection Twice a week     • lamoTRIgine (LaMICtal) 25 mg tablet Take 2 tablets (50 mg total) by mouth daily 60 tablet 2   • levothyroxine 25 mcg tablet Take 1 tablet (25 mcg total) by mouth daily in the early morning 30 tablet 3   • pantoprazole (PROTONIX) 40 mg tablet Take 40 mg by mouth daily     • albuterol (Ventolin HFA) 90 mcg/act inhaler Inhale 2 puffs every 6 (six) hours as needed for wheezing 18 g 5   • ALPRAZolam (XANAX) 0.5 mg tablet Take 1 tablet (0.5 mg total) by mouth daily as needed for anxiety (Patient not taking: Reported on 11/10/2023) 60 tablet 0   • ergocalciferol (VITAMIN D2) 50,000 units Take 50,000 Units by mouth once a week Takes on Mondays        No current facility-administered medications for this visit. Review of Systems  Video Exam    There were no vitals filed for this visit. Physical Exam   As a result of this visit, I have referred the patient for further respiratory evaluation. No    I spent 15 minutes directly with the patient during this visit  [facility] acknowledges that she has consented to an online visit or consultation. She understands that the online visit is based solely on information provided by her, and that, in the absence of a face-to-face physical evaluation by the physician, the diagnosis she receives is both limited and provisional in terms of accuracy and completeness. This is not intended to replace a full medical face-to-face evaluation by the physician. Chilton Collet understands and accepts these terms. MEDICATION MANAGEMENT NOTE        [facility]    Name and Date of Birth:  Chilton Collet 37 y.o. 1980 MRN: [id number]    Date of Visit: November 10, 2023    Allergies   Allergen Reactions   • Nickel Hives, Itching and Rash     "Localized"       Visit Time    Visit Start Time: 7041  Visit Stop Time: 8041  Total Visit Duration:  15 minutes    SUBJECTIVE:    Roni Rojas is seen today for a follow up for Major Depressive Disorder, Generalized Anxiety Disorder and ADHD. She continues to experience ongoing symptoms since the last visit. Roni Rojas seen virtually today for medication management follow up.  She was last seen by this provider on 10/13/23. She reports that she continues to experience anxiety that causes her to have irritability at this time. She states that it "comes and goes". She reports that it causes her to feel flustered and irritated. She thinks that the weather and time change may also have some effect on her mood as well. She states that her sleep fluctuates and is "so so". She gets approximately 4-5 hours of sleep per night. She denies SI/HI. Denies auditory and visual hallucinations. She is calm and pleasant in conversation today. She reports she is working and is keeping the weight off that she lost during surgery. She reports no side effects to the Abilify at this time. We will increase abilify to 5mg PO Daily and reassess in one month. She is agreeable at this time. She denies any side effects from current psychiatric medications. PLAN:  Continue medications as ordered:  Continue Lamictal 50mg PO daily   Increase abilify to 5mg PO daily  Follow up in one month  She will call sooner with concerns or issues if they arise prior to scheduled appointment    Aware of 24 hour and weekend coverage for urgent situations accessed by calling [facility] main practice number  Medication management every 1 month  Aware of need to follow up with family physician for medical issues    Diagnoses and all orders for this visit:    Bipolar 2 disorder, major depressive episode ([address])  -     ARIPiprazole (ABILIFY) 5 mg tablet;  Take 1 tablet (5 mg total) by mouth daily      Current Rating Scores:     Current PHQ-9   PHQ-2/9 Depression Screening    Little interest or pleasure in doing things: 2 - more than half the days  Feeling down, depressed, or hopeless: 2 - more than half the days  Trouble falling or staying asleep, or sleeping too much: 2 - more than half the days  Feeling tired or having little energy: 2 - more than half the days  Poor appetite or overeatin - more than half the days  Feeling bad about yourself - or that you are a failure or have let yourself or your family down: 2 - more than half the days  Trouble concentrating on things, such as reading the newspaper or watching television: 2 - more than half the days  Moving or speaking so slowly that other people could have noticed. Or the opposite - being so fidgety or restless that you have been moving around a lot more than usual: 0 - not at all  Thoughts that you would be better off dead, or of hurting yourself in some way: 0 - not at all  PHQ-9 Score: 14   PHQ-9 Interpretation: Moderate depression          Current Outpatient Medications on File Prior to Visit   Medication Sig Dispense Refill   • Enbrel SureClick 50 MG/ML injection Twice a week     • lamoTRIgine (LaMICtal) 25 mg tablet Take 2 tablets (50 mg total) by mouth daily 60 tablet 2   • levothyroxine 25 mcg tablet Take 1 tablet (25 mcg total) by mouth daily in the early morning 30 tablet 3   • pantoprazole (PROTONIX) 40 mg tablet Take 40 mg by mouth daily     • albuterol (Ventolin HFA) 90 mcg/act inhaler Inhale 2 puffs every 6 (six) hours as needed for wheezing 18 g 5   • ALPRAZolam (XANAX) 0.5 mg tablet Take 1 tablet (0.5 mg total) by mouth daily as needed for anxiety (Patient not taking: Reported on 11/10/2023) 60 tablet 0   • ergocalciferol (VITAMIN D2) 50,000 units Take 50,000 Units by mouth once a week Takes on Mondays      • [DISCONTINUED] ARIPiprazole (ABILIFY) 2 mg tablet Take 1 tablet (2 mg total) by mouth daily 30 tablet 0   • [DISCONTINUED] hydrOXYzine HCL (ATARAX) 25 mg tablet Take 1-2 tablets at night for anxiety and sleep as needed. 60 tablet 0     No current facility-administered medications on file prior to visit. Psychotherapy Provided:     Individual psychotherapy provided: Yes  Counseling was provided during the session today for 16 minutes. Supportive counseling provided. Medication changes discussed with Tiera Skinner.   Medication education provided to Ragini. Recent stressor including ongoing anxiety discussed with Ragini. Coping strategies reviewed with Ragini. Importance of medication and treatment compliance reviewed with Ragini. Importance of follow up with family physician for medical issues reviewed with Ragini. Reassurance and supportive therapy provided. Crisis/safety plan discussed with Ragini. Patient will call prior to scheduled appointment if they have any issues or concerns. Patient understands they can access the office by calling the main number at any time if they are in crisis. They also understand they can call their [facility]'s crisis number or go to their nearest ED if suicidal ideation increases or if they develop a plan or intent. HPI ROS Appetite Changes and Sleep:     She reports difficulty falling asleep, frequent awakenings, adequate appetite, normal energy level.  Denies homicidal ideation, denies suicidal ideation    Review Of Systems:    HPI ROS:               Medication Side Effects:     Depression (10 worst): No change 2/10   Anxiety (10 worst): No change 7/10   Safety concerns (SI, HI, etc): denies denies   Sleep: No change Poor, trouble falling asleep and trouble staying asleep   Energy: good fair   Appetite: good good   Weight Change: No change      General sleep disturbances   Personality no change in personality   Constitutional as noted in HPI   ENT negative   Cardiovascular negative   Respiratory negative   Gastrointestinal negative   Genitourinary negative   Musculoskeletal negative   Integumentary negative   Neurological negative   Endocrine negative   Other Symptoms none, all other systems are negative     Mental Status Evaluation:    Appearance Appropriately dressed and Good eye contact   Behavior calm and cooperative   Mood anxious  Depression Scale -  no change  of 10 (0 = No depression)  Anxiety Scale -  no change  of 10 (0 = No anxiety)   Speech Normal rate and volume   Affect Appears calm, is friendly and appropriate in conversation   Thought Processes Goal directed and coherent   Thought Content Does not verbalize delusional material   Associations Tightly connected   Perceptual Disturbances Denies hallucinations and does not appear to be responding to internal stimuli   Risk Potential Suicidal/Homicidal Ideation - No evidence of suicidal or homicidal ideation and patient does not verbalize suicidal or homicidal ideation  Risk of Violence - No evidence of risk for violence found on assessment  Risk of Self Mutilation - No evidence of risk for self mutilation found on assessment   Orientation oriented to person, place, time/date and situation   Memory recent and remote memory grossly intact   Consciousness alert and awake   Attention/Concentration attention span and concentration are age appropriate   Insight intact   Judgement intact   Muscle Strength and Gait normal muscle strength and normal muscle tone, normal gait/station and normal balance   Motor Activity no abnormal movements   Language no difficulty naming common objects, no difficulty repeating a phrase, no difficulty writing a sentence   Fund of Knowledge adequate knowledge of current events  adequate fund of knowledge regarding past history  adequate fund of knowledge regarding vocabulary      Past Psychiatric History  Previous diagnoses include ADHD, Anxiety  Prior outpatient psychiatric treatment: PCP  Prior therapy: in the past, unable to recall where  Prior inpatient psychiatric treatment: denies  Prior suicide attempts: denies  Prior self harm: denies  Prior violence or aggression: denies    Past Psychiatric History Update:     Inpatient Psychiatric Admission Since Last Encounter:   no  Changes to Outpatient Psychiatric Treatment Team:    no  Suicide Attempt Or Self Mutilation Since Last Encounter:   no  Incidence of Violent Behavior Since Last Encounter:   no    Traumatic History Update:     New Onset of Abuse Since Last Encounter:   no  Traumatic Events Since Last Encounter:   no    Past Medical History:    Past Medical History:   Diagnosis Date   • ADHD (attention deficit hyperactivity disorder)    • Anxiety    • Chronic pain disorder 2016    Ongoing fibromyalgia and psoartic arthritis   • 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[address])     Prothrombin Gene Mutation Factor II   • Colon polyp    • Depression    • Disease of thyroid gland    • Ear problems    • Fatty liver    • GERD (gastroesophageal reflux disease)    • Hashimoto's disease    • Headache(784.0) Since i was young    On going   • Heart murmur    • Mitral valve prolapse    • Obesity (BMI 30-39. 9)    • Obsessive-compulsive disorder     Ocd tendencies never dx   • Panic attack    • PONV (postoperative nausea and vomiting)    • Prothrombin gene mutation (HCC)     Factor II   • Psoriatic arthritis ([address])    • Sleep difficulties    • SVT (supraventricular tachycardia)    • Varicosities of leg         Past Surgical History:   Procedure Laterality Date   • CARDIAC ELECTROPHYSIOLOGY STUDY AND ABLATION      X 2 for SVT   • CARDIAC SURGERY  2017 and 2018    cardiac ablation   • CERVICAL BIOPSY  W/ LOOP ELECTRODE EXCISION  2004   • CHOLECYSTECTOMY     • COLONOSCOPY     • TN TONSILLECTOMY & ADENOIDECTOMY AGE 12/> N/A 12/10/2021    Procedure: TONSILLECTOMY & ADENOIDECTOMY;  Surgeon: Marce Vazquez MD;  Location: [facility];  Service: ENT   • TONSILLECTOMY  9/2021   • WISDOM TOOTH EXTRACTION       Allergies   Allergen Reactions   • Nickel Hives, Itching and Rash     "Localized"     Substance Abuse History:    Social History     Substance and Sexual Activity   Alcohol Use Not Currently    Comment: rare Socially     Social History     Substance and Sexual Activity   Drug Use Never     Social History:    Social History     Socioeconomic History   • Marital status: Single     Spouse name: Not on file   • Number of children: 1   • Years of education: 2 yr college   • Highest education level: Associate degree: academic program   Occupational History   • Occupation: agency nursing   Tobacco Use   • Smoking status: Former     Packs/day: 0.50     Years: 10.00     Total pack years: 5.00     Types: Cigarettes     Start date: 12     Quit date: 2016     Years since quittin.1   • Smokeless tobacco: Never   Vaping Use   • Vaping Use: Never used   Substance and Sexual Activity   • Alcohol use: Not Currently     Comment: rare Socially   • Drug use: Never   • Sexual activity: Yes     Partners: Male     Birth control/protection: Abstinence, Other   Other Topics Concern   • Not on file   Social History Narrative    · Most recent tobacco use screenin2019      · Do you currently or have you served in [employer] [address] [place] [address] Babycare:   No      · Live alone or with others:   with others      · Are you currently employed: Yes      · Alcohol intake:   Occasional      · Illicit drugs:   none      · Caffeine intake:    Moderate      · Diet:   Regular      · Exercise level:   Occasional      · Education:   2 Year College      · Guns present in home:   No      · Seat belts used routinely:   Yes      · Sexual orientation:   Heterosexual      · Sunscreen used routinely:   Yes      · General stress level:   Medium      Social Determinants of Health     Financial Resource Strain: Not on file   Food Insecurity: Not on file   Transportation Needs: Not on file   Physical Activity: Not on file   Stress: Not on file   Social Connections: Not on file   Intimate Partner Violence: Not on file   Housing Stability: Not on file     Family Psychiatric History:     Family History   Problem Relation Age of Onset   • Hypothyroidism Mother    • Varicose Veins Mother    • Asthma Mother    • Hypothyroidism Father    • Heart disease Father    • Heart attack Father 52   • Emphysema Father    • COPD Father         former smoker   • Heart failure Father         heart attack   • Varicose Veins Sister    • Diabetes Maternal Grandmother    • Psoriasis Paternal Grandmother    • Diabetes Paternal Grandmother    • Anemia Cousin      History Review: The following portions of the patient's history were reviewed and updated as appropriate: allergies, current medications, past family history, past medical history, past social history, past surgical history and problem list     OBJECTIVE:     Vital signs in last 24 hours: There were no vitals filed for this visit. Laboratory Results:   Recent Labs (last 2 months):   No visits with results within 2 Month(s) from this visit. Latest known visit with results is:   Orders Only on 02/03/2023   Component Date Value   • Hemoglobin A1C 02/03/2023 5.5      I have personally reviewed all pertinent laboratory/tests results. Suicide/Homicide Risk Assessment:    Risk of Harm to Self:  The following ratings are based on assessment at the time of the interview  Recent Specific Risk Factors include: current anxiety symptoms, unstable mood, feelings of guilt or self blame  Demographic risk factors include:   Historical Risk Factors include: chronic anxiety symptoms, history of mood disorder, history of impulsive behaviors  Protective Factors: no current suicidal ideation, access to mental health treatment, being a parent, being , compliant with medications, compliant with mental health treatment, connection to own children, effective coping skills, effective decision-making skills, good health, good self-esteem, having a desire to be alive, having a sense of purpose or meaning in life, responsibilities and duties to others, restricted access to lethal means, stable living environment, stable job, sense of determination, strong relationships, supportive family  Weapons: none. The following steps have been taken to ensure weapons are properly secured: not applicable  Based on today's assessment, Romi Rizo presents the following risk of harm to self: minimal    Risk of Harm to Others:   The following ratings are based on assessment at the time of the interview  Protective Factors: no current homicidal ideation  Based on today's assessment, Ariella Carl presents the following risk of harm to others: none    The following interventions are recommended: contracts for safety at present - agrees to go to ED if feeling unsafe, return in 1 month for reassessment, contracts for safety at present - agrees to call Crisis Intervention Service if feeling unsafe    Medications Risks/Benefits:      Risks, Benefits And Possible Side Effects Of Medications:    Discussed risks and benefits of treatment with patient including risk of parkinsonian symptoms, metabolic syndrome, tardive dyskinesia and neuroleptic malignant syndrome related to treatment with antipsychotic medications and risk of rash related to treatment with Lamictal     Controlled Medication Discussion:     Not applicable    Treatment Plan:    Due for update/Updated:   no  Last treatment plan done 9/15/23 by SCOT Luo. Treatment Plan due on 3/15/24. SCOT Daniels 11/10/23    This note was shared with patient.

## 2023-11-15 ENCOUNTER — TELEPHONE (OUTPATIENT)
Dept: FAMILY MEDICINE CLINIC | Facility: CLINIC | Age: 43
End: 2023-11-15

## 2023-11-15 ENCOUNTER — APPOINTMENT (OUTPATIENT)
Dept: RADIOLOGY | Facility: CLINIC | Age: 43
End: 2023-11-15
Payer: COMMERCIAL

## 2023-11-15 ENCOUNTER — OCCMED (OUTPATIENT)
Dept: URGENT CARE | Facility: CLINIC | Age: 43
End: 2023-11-15

## 2023-11-15 ENCOUNTER — OFFICE VISIT (OUTPATIENT)
Dept: FAMILY MEDICINE CLINIC | Facility: CLINIC | Age: 43
End: 2023-11-15
Payer: COMMERCIAL

## 2023-11-15 VITALS
WEIGHT: 169.6 LBS | HEART RATE: 72 BPM | TEMPERATURE: 98 F | OXYGEN SATURATION: 98 % | SYSTOLIC BLOOD PRESSURE: 122 MMHG | DIASTOLIC BLOOD PRESSURE: 78 MMHG | BODY MASS INDEX: 28.26 KG/M2 | HEIGHT: 65 IN

## 2023-11-15 DIAGNOSIS — Z02.1 PHYSICAL EXAM, PRE-EMPLOYMENT: ICD-10-CM

## 2023-11-15 DIAGNOSIS — Z11.1 SCREENING EXAMINATION FOR PULMONARY TUBERCULOSIS: Primary | ICD-10-CM

## 2023-11-15 DIAGNOSIS — Z98.84 S/P BARIATRIC SURGERY: ICD-10-CM

## 2023-11-15 DIAGNOSIS — F41.9 ANXIETY: Primary | ICD-10-CM

## 2023-11-15 DIAGNOSIS — Z00.00 ANNUAL PHYSICAL EXAM: ICD-10-CM

## 2023-11-15 DIAGNOSIS — Z02.1 PHYSICAL EXAM, PRE-EMPLOYMENT: Primary | ICD-10-CM

## 2023-11-15 DIAGNOSIS — R12 HEARTBURN: ICD-10-CM

## 2023-11-15 DIAGNOSIS — F90.2 ADHD (ATTENTION DEFICIT HYPERACTIVITY DISORDER), COMBINED TYPE: ICD-10-CM

## 2023-11-15 PROCEDURE — 99214 OFFICE O/P EST MOD 30 MIN: CPT | Performed by: FAMILY MEDICINE

## 2023-11-15 PROCEDURE — 99396 PREV VISIT EST AGE 40-64: CPT | Performed by: FAMILY MEDICINE

## 2023-11-15 PROCEDURE — 71045 X-RAY EXAM CHEST 1 VIEW: CPT

## 2023-11-15 RX ORDER — ESCITALOPRAM OXALATE 10 MG/1
10 TABLET ORAL DAILY
COMMUNITY
Start: 2023-07-08 | End: 2023-11-15 | Stop reason: SDUPTHER

## 2023-11-15 RX ORDER — PANTOPRAZOLE SODIUM 20 MG/1
20 TABLET, DELAYED RELEASE ORAL DAILY
Qty: 90 TABLET | Refills: 1 | Status: SHIPPED | OUTPATIENT
Start: 2023-11-15

## 2023-11-15 RX ORDER — ESCITALOPRAM OXALATE 10 MG/1
10 TABLET ORAL DAILY
Qty: 90 TABLET | Refills: 1 | Status: SHIPPED | OUTPATIENT
Start: 2023-11-15

## 2023-11-15 RX ORDER — DEXTROAMPHETAMINE SACCHARATE, AMPHETAMINE ASPARTATE, DEXTROAMPHETAMINE SULFATE AND AMPHETAMINE SULFATE 1.25; 1.25; 1.25; 1.25 MG/1; MG/1; MG/1; MG/1
5 TABLET ORAL 2 TIMES DAILY
Qty: 60 TABLET | Refills: 0 | Status: SHIPPED | OUTPATIENT
Start: 2023-11-15

## 2023-11-15 RX ORDER — DEXTROAMPHETAMINE SACCHARATE, AMPHETAMINE ASPARTATE MONOHYDRATE, DEXTROAMPHETAMINE SULFATE AND AMPHETAMINE SULFATE 1.25; 1.25; 1.25; 1.25 MG/1; MG/1; MG/1; MG/1
5 CAPSULE, EXTENDED RELEASE ORAL EVERY MORNING
Qty: 90 CAPSULE | Refills: 0 | Status: CANCELLED | OUTPATIENT
Start: 2023-11-15

## 2023-11-15 NOTE — ASSESSMENT & PLAN NOTE
Reduce pantoprazole to 20 mg.  Discussed possibly discontinuing this after 30 days to see if she still needs it (s/p gastric bypass)

## 2023-11-15 NOTE — PROGRESS NOTES
[address] [facility]    NAME: Dyllan Jackman  AGE: 37 y.o. SEX: female  : 1980     DATE: 11/15/2023     Assessment and Plan:     Problem List Items Addressed This Visit        Other    Anxiety - Primary     Restart Lexapro 10 mg. Can send update in 4-6 wks, will consider higher dose if needed. Relevant Medications    escitalopram (Lexapro) 10 mg tablet    ADHD (attention deficit hyperactivity disorder), combined type     Will restart Adderall 5 mg BID. Discussed common side effects. Relevant Medications    escitalopram (Lexapro) 10 mg tablet    amphetamine-dextroamphetamine (ADDERALL, 5MG,) 5 MG tablet    S/P bariatric surgery     Update labs  Encouraged continued weight loss         Relevant Orders    CBC and differential    Comprehensive metabolic panel    Iron    Vitamin B12    Vitamin D 25 hydroxy    Ferritin    TSH, 3rd generation with Free T4 reflex    Heartburn     Reduce pantoprazole to 20 mg. Discussed possibly discontinuing this after 30 days to see if she still needs it (s/p gastric bypass)         Relevant Medications    pantoprazole (PROTONIX) 20 mg tablet   Other Visit Diagnoses     Annual physical exam              Immunizations and preventive care screenings were discussed with patient today. Appropriate education was printed on patient's after visit summary. Counseling:  Alcohol/drug use: discussed moderation in alcohol intake, the recommendations for healthy alcohol use, and avoidance of illicit drug use. Exercise: the importance of regular exercise/physical activity was discussed. Recommend exercise 3-5 times per week for at least 30 minutes. BMI Counseling: Body mass index is 28.22 kg/m². The BMI is above normal. Nutrition recommendations include encouraging healthy choices of fruits and vegetables. Exercise recommendations include exercising 3-5 times per week.  Rationale for BMI follow-up plan is due to patient being overweight or obese. Return in about 3 months (around 2/15/2024). Chief Complaint:     Chief Complaint   Patient presents with   • Follow-up     Pt would like lab slip      History of Present Illness:     Adult Annual Physical   Patient here for a comprehensive physical exam. The patient reports in June, she had gastric bypass surgery at [facility]. Would like labs done. She had seen psychiatry since our last visit, but did not feel a connection and also was not happy with the medication adjustments. She did better on Lexapro and would like to go back on it. Previously took 20 mg. She has Xanax which she takes sparingly. She has disorganized thoughts and asks about getting back on Adderall. She was on this in the past and did well with it. She is working and taking college courses. Diet and Physical Activity  Diet/Nutrition: well balanced diet. Small portions  Exercise: strength training exercises. Depression Screening  PHQ-2/9 Depression Screening         General Health  Sleep: sleeps poorly. Has trouble staying asleep. Gets 4 hours a night. Tried Melatonin. Hearing:  no issues . Vision: no vision problems. Dental: regular dental visits. /GYN Health  Follows with gynecology? yes   Patient is: premenopausal       Review of Systems:     Review of Systems   Constitutional:  Positive for fatigue. Negative for activity change, appetite change, chills, fever and unexpected weight change. HENT:  Negative for congestion, ear discharge, ear pain, postnasal drip, sinus pressure and sore throat. Eyes:  Negative for discharge and visual disturbance. Respiratory:  Negative for cough, shortness of breath and wheezing. Cardiovascular:  Negative for chest pain, palpitations and leg swelling. Gastrointestinal:  Negative for abdominal pain, constipation, diarrhea, nausea and vomiting.    Endocrine: Negative for cold intolerance, heat intolerance, polydipsia and polyuria. Genitourinary:  Negative for difficulty urinating and frequency. Musculoskeletal:  Negative for arthralgias, back pain, joint swelling and myalgias. Skin:  Negative for rash. Neurological:  Negative for dizziness, weakness, light-headedness, numbness and headaches. Hematological:  Negative for adenopathy. Psychiatric/Behavioral:  Positive for decreased concentration and sleep disturbance. Negative for behavioral problems, confusion, dysphoric mood and suicidal ideas. The patient is nervous/anxious. Past Medical History:     Past Medical History:   Diagnosis Date   • ADHD (attention deficit hyperactivity disorder)    • Anxiety    • Chronic pain disorder 2016    Ongoing fibromyalgia and psoartic arthritis   • 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[address])     Prothrombin Gene Mutation Factor II   • Colon polyp    • Depression    • Disease of thyroid gland    • Ear problems    • Fatty liver    • GERD (gastroesophageal reflux disease)    • Hashimoto's disease    • Headache(784.0) Since i was young    On going   • Heart murmur    • Mitral valve prolapse    • Obesity (BMI 30-39. 9)    • Obsessive-compulsive disorder     Ocd tendencies never dx   • Panic attack    • PONV (postoperative nausea and vomiting)    • Prothrombin gene mutation (HCC)     Factor II   • Psoriatic arthritis ([address])    • Sleep difficulties    • SVT (supraventricular tachycardia)    • Varicosities of leg       Past Surgical History:     Past Surgical History:   Procedure Laterality Date   • CARDIAC ELECTROPHYSIOLOGY STUDY AND ABLATION      X 2 for SVT   • CARDIAC SURGERY  2017 and 2018    cardiac ablation   • CERVICAL BIOPSY  W/ LOOP ELECTRODE EXCISION  2004   • CHOLECYSTECTOMY     • COLONOSCOPY     • RI TONSILLECTOMY & ADENOIDECTOMY AGE 12/> N/A 12/10/2021    Procedure: TONSILLECTOMY & ADENOIDECTOMY;  Surgeon: Rogelio Walsh MD;  Location: [facility];  Service: ENT   • TONSILLECTOMY  9/2021   • WISDOM TOOTH EXTRACTION        Social History:     Social History     Socioeconomic History   • Marital status: Single     Spouse name: None   • Number of children: 1   • Years of education: 2 yr college   • Highest education level: Associate degree: academic program   Occupational History   • Occupation: agency nursing   Tobacco Use   • Smoking status: Former     Packs/day: 0.50     Years: 10.00     Total pack years: 5.00     Types: Cigarettes     Start date: 12     Quit date: 2016     Years since quittin.1   • Smokeless tobacco: Never   Vaping Use   • Vaping Use: Never used   Substance and Sexual Activity   • Alcohol use: Not Currently     Comment: rare Socially   • Drug use: Never   • Sexual activity: Yes     Partners: Male     Birth control/protection: Abstinence, Other   Other Topics Concern   • None   Social History Narrative    · Most recent tobacco use screenin2019      · Do you currently or have you served in the [address] [place] [address] [employer]:   No      · Live alone or with others:   with others      · Are you currently employed: Yes      · Alcohol intake:   Occasional      · Illicit drugs:   none      · Caffeine intake:    Moderate      · Diet:   Regular      · Exercise level:   Occasional      · Education:   2 Year College      · Guns present in home:   No      · Seat belts used routinely:   Yes      · Sexual orientation:   Heterosexual      · Sunscreen used routinely:   Yes      · General stress level:   Medium      Social Determinants of Health     Financial Resource Strain: Not on file   Food Insecurity: Not on file   Transportation Needs: Not on file   Physical Activity: Not on file   Stress: Not on file   Social Connections: Not on file   Intimate Partner Violence: Not on file   Housing Stability: Not on file      Family History:     Family History   Problem Relation Age of Onset   • Hypothyroidism Mother    • Varicose Veins Mother    • Asthma Mother    • Hypothyroidism Father    • Heart disease Father    • Heart attack Father 52   • Emphysema Father    • COPD Father         former smoker   • Heart failure Father         heart attack   • Varicose Veins Sister    • Diabetes Maternal Grandmother    • Psoriasis Paternal Grandmother    • Diabetes Paternal Grandmother    • Anemia Cousin       Current Medications:     Current Outpatient Medications   Medication Sig Dispense Refill   • ALPRAZolam (XANAX) 0.5 mg tablet Take 1 tablet (0.5 mg total) by mouth daily as needed for anxiety 60 tablet 0   • amphetamine-dextroamphetamine (ADDERALL, 5MG,) 5 MG tablet Take 1 tablet (5 mg total) by mouth 2 (two) times a day Max Daily Amount: 10 mg 60 tablet 0   • Enbrel SureClick 50 MG/ML injection Twice a week     • escitalopram (Lexapro) 10 mg tablet Take 1 tablet (10 mg total) by mouth daily 90 tablet 1   • levothyroxine 25 mcg tablet Take 1 tablet (25 mcg total) by mouth daily in the early morning 30 tablet 3   • pantoprazole (PROTONIX) 20 mg tablet Take 1 tablet (20 mg total) by mouth daily 90 tablet 1     No current facility-administered medications for this visit. Allergies: Allergies   Allergen Reactions   • Nickel Hives, Itching and Rash     "Localized"      Physical Exam:     /78 (BP Location: Left arm, Patient Position: Sitting)   Pulse 72   Temp 98 °F (36.7 °C) (Temporal)   Ht 5' 5" (1.651 m)   Wt 76.9 kg (169 lb 9.6 oz)   SpO2 98%   BMI 28.22 kg/m²     Physical Exam  Vitals and nursing note reviewed. Constitutional:       General: She is not in acute distress. Appearance: She is well-developed. She is not diaphoretic. HENT:      Head: Normocephalic and atraumatic. Right Ear: Tympanic membrane, ear canal and external ear normal.      Left Ear: Tympanic membrane, ear canal and external ear normal.      Mouth/Throat:      Mouth: Mucous membranes are moist.      Pharynx: Oropharynx is clear. Eyes:      Conjunctiva/sclera: Conjunctivae normal.   Cardiovascular:      Rate and Rhythm: Normal rate and regular rhythm. Heart sounds: Normal heart sounds. No murmur heard. No friction rub. No gallop. Pulmonary:      Effort: Pulmonary effort is normal. No respiratory distress. Breath sounds: Normal breath sounds. No stridor. No wheezing or rales. Chest:      Chest wall: No tenderness. Abdominal:      Palpations: Abdomen is soft. Tenderness: There is no abdominal tenderness. Musculoskeletal:      Right lower leg: No edema. Left lower leg: No edema. Neurological:      General: No focal deficit present. Mental Status: She is alert. Psychiatric:         Mood and Affect: Mood normal.         Behavior: Behavior normal.         Thought Content:  Thought content normal.         Judgment: Judgment normal.          Ciera Elkins MD  [address] [facility]

## 2023-12-06 DIAGNOSIS — E66.01 CLASS 3 SEVERE OBESITY DUE TO EXCESS CALORIES WITH SERIOUS COMORBIDITY IN ADULT, UNSPECIFIED BMI (HCC): ICD-10-CM

## 2023-12-06 DIAGNOSIS — Z00.00 ANNUAL PHYSICAL EXAM: ICD-10-CM

## 2023-12-06 DIAGNOSIS — Z20.9 EXPOSURE TO POTENTIAL INFECTION: ICD-10-CM

## 2023-12-06 DIAGNOSIS — F32.0 CURRENT MILD EPISODE OF MAJOR DEPRESSIVE DISORDER, UNSPECIFIED WHETHER RECURRENT (HCC): ICD-10-CM

## 2023-12-06 RX ORDER — LEVOTHYROXINE SODIUM 0.03 MG/1
25 TABLET ORAL
Qty: 30 TABLET | Refills: 3 | Status: SHIPPED | OUTPATIENT
Start: 2023-12-06

## 2023-12-28 DIAGNOSIS — F41.9 ANXIETY: ICD-10-CM

## 2023-12-28 RX ORDER — ESCITALOPRAM OXALATE 20 MG/1
20 TABLET ORAL DAILY
Qty: 90 TABLET | Refills: 1 | Status: SHIPPED | OUTPATIENT
Start: 2023-12-28

## 2024-02-03 DIAGNOSIS — Z00.6 ENCOUNTER FOR EXAMINATION FOR NORMAL COMPARISON OR CONTROL IN CLINICAL RESEARCH PROGRAM: ICD-10-CM

## 2024-03-07 DIAGNOSIS — Z12.11 ENCOUNTER FOR SCREENING COLONOSCOPY: Primary | ICD-10-CM

## 2024-03-15 ENCOUNTER — OFFICE VISIT (OUTPATIENT)
Dept: FAMILY MEDICINE CLINIC | Facility: CLINIC | Age: 44
End: 2024-03-15
Payer: COMMERCIAL

## 2024-03-15 VITALS
OXYGEN SATURATION: 98 % | HEART RATE: 61 BPM | SYSTOLIC BLOOD PRESSURE: 124 MMHG | TEMPERATURE: 97.4 F | WEIGHT: 149 LBS | DIASTOLIC BLOOD PRESSURE: 82 MMHG | BODY MASS INDEX: 24.83 KG/M2 | HEIGHT: 65 IN

## 2024-03-15 DIAGNOSIS — J01.00 ACUTE NON-RECURRENT MAXILLARY SINUSITIS: ICD-10-CM

## 2024-03-15 DIAGNOSIS — E06.3 HASHIMOTO'S DISEASE: ICD-10-CM

## 2024-03-15 DIAGNOSIS — F90.2 ADHD (ATTENTION DEFICIT HYPERACTIVITY DISORDER), COMBINED TYPE: ICD-10-CM

## 2024-03-15 DIAGNOSIS — M79.7 CHRONIC FATIGUE SYNDROME WITH FIBROMYALGIA: Primary | ICD-10-CM

## 2024-03-15 DIAGNOSIS — G93.32 CHRONIC FATIGUE SYNDROME WITH FIBROMYALGIA: Primary | ICD-10-CM

## 2024-03-15 DIAGNOSIS — Z12.31 ENCOUNTER FOR SCREENING MAMMOGRAM FOR MALIGNANT NEOPLASM OF BREAST: ICD-10-CM

## 2024-03-15 PROCEDURE — 99214 OFFICE O/P EST MOD 30 MIN: CPT | Performed by: FAMILY MEDICINE

## 2024-03-15 RX ORDER — METHYLPHENIDATE HYDROCHLORIDE 5 MG/1
5 TABLET ORAL
Qty: 60 TABLET | Refills: 0 | Status: SHIPPED | OUTPATIENT
Start: 2024-03-15

## 2024-03-15 RX ORDER — ROPINIROLE 0.5 MG/1
0.5 TABLET, FILM COATED ORAL 2 TIMES DAILY
COMMUNITY
Start: 2024-03-14 | End: 2025-03-14

## 2024-03-15 RX ORDER — AMOXICILLIN AND CLAVULANATE POTASSIUM 875; 125 MG/1; MG/1
1 TABLET, FILM COATED ORAL EVERY 12 HOURS SCHEDULED
Qty: 20 TABLET | Refills: 0 | Status: SHIPPED | OUTPATIENT
Start: 2024-03-15 | End: 2024-03-25

## 2024-03-15 NOTE — PROGRESS NOTES
Assessment/Plan:         Problem List Items Addressed This Visit        Respiratory    Acute non-recurrent maxillary sinusitis     Will start abx         Relevant Medications    amoxicillin-clavulanate (AUGMENTIN) 875-125 mg per tablet       Endocrine    Hashimoto's disease     Recent TSH (Dec) was normal  On levothyroxine 25 mcg            Nervous and Auditory    Chronic fatigue syndrome with fibromyalgia - Primary     Continue Lexapro and Ritalin   Follows with Rheumatology             Behavioral Health    ADHD (attention deficit hyperactivity disorder), combined type     Did not tolerate Adderall, will switch to Ritalin which she has been tolerating         Relevant Medications    methylphenidate (Ritalin) 5 mg tablet       Obstetrics/Gynecology    Encounter for screening mammogram for malignant neoplasm of breast    Relevant Orders    Mammo screening bilateral w 3d & cad       FOLLOW-UP in 6 months    Subjective:      Patient ID: Joselin Diamond is a 43 y.o. female.    43 year old here states has been sick for a month. Started in February with a cold and symptoms persist.  She has congestion, drip and drainage.  Occasional cough. Mild sore throat.  No fevers, chills. Some sinus pressure, feels like she is worsening.     She also has fatigue and was wondering if thyroid should be checked. She had a TSH in December was normal.    She was on Adderall but noticed she was grinding her teeth and felt jittery, so she weaned herself off. She had previously been on Ritalin and did better on this, she had some leftover so started taking it and is doing well. States it helps her mood and anxiety.    URI   Associated symptoms include congestion, coughing, sinus pain and a sore throat.       The following portions of the patient's history were reviewed and updated as appropriate:   Past Medical History:  She has a past medical history of ADHD (attention deficit hyperactivity disorder), Anxiety, Chronic pain disorder (2016),  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HCC), Colon polyp, Depression, Disease of thyroid gland, Ear problems, Fatty liver, GERD (gastroesophageal reflux disease), Hashimoto's disease, Headache(784.0) (Since i was young), Heart murmur, Mitral valve prolapse, Obesity (BMI 30-39.9), Obsessive-compulsive disorder, Panic attack, PONV (postoperative nausea and vomiting), Prothrombin gene mutation (HCC), Psoriatic arthritis (HCC), Sleep difficulties, SVT (supraventricular tachycardia), and Varicosities of leg.,  _______________________________________________________________________  Medical Problems:  does not have any pertinent problems on file.,  _______________________________________________________________________  Past Surgical History:   has a past surgical history that includes Cholecystectomy; Cardiac electrophysiology study and ablation; Cervical biopsy w/ loop electrode excision (2004); [place] tooth extraction; Colonoscopy; pr tonsillectomy & adenoidectomy age 12/> (N/A, 12/10/2021); Cardiac surgery (2017 and 2018); and Tonsillectomy (9/2021).,  _______________________________________________________________________  Family History:  family history includes Anemia in her cousin; Asthma in her mother; COPD in her father; Diabetes in her maternal grandmother and paternal grandmother; Emphysema in her father; Heart attack (age of onset: 47) in her father; Heart disease in her father; Heart failure in her father; Hypothyroidism in her father and mother; Psoriasis in her paternal grandmother; Varicose Veins in her mother and sister.,  _______________________________________________________________________  Social History:   reports that she quit smoking about 7 years ago. Her smoking use included cigarettes. She started smoking about 30 years ago. She has a 11.4 pack-year smoking history. She has never used smokeless tobacco. She reports that she does not currently use alcohol. She reports that she does not use  "drugs.,  _______________________________________________________________________  Allergies:  is allergic to nickel..  _______________________________________________________________________  Current Outpatient Medications   Medication Sig Dispense Refill   • ALPRAZolam (XANAX) 0.5 mg tablet Take 1 tablet (0.5 mg total) by mouth daily as needed for anxiety 60 tablet 0   • amoxicillin-clavulanate (AUGMENTIN) 875-125 mg per tablet Take 1 tablet by mouth every 12 (twelve) hours for 10 days 20 tablet 0   • Enbrel SureClick 50 MG/ML injection Twice a week     • escitalopram (Lexapro) 20 mg tablet Take 1 tablet (20 mg total) by mouth daily 90 tablet 1   • levothyroxine 25 mcg tablet take 1 tablet by mouth daily IN THE EARLY MORNING 30 tablet 3   • methylphenidate (Ritalin) 5 mg tablet Take 1 tablet (5 mg total) by mouth 2 (two) times a day before breakfast and lunch Max Daily Amount: 10 mg 60 tablet 0   • pantoprazole (PROTONIX) 20 mg tablet Take 1 tablet (20 mg total) by mouth daily 90 tablet 1   • rOPINIRole (REQUIP) 0.5 mg tablet Take 0.5 mg by mouth 2 (two) times a day       No current facility-administered medications for this visit.     _______________________________________________________________________  Review of Systems   Constitutional:  Positive for fatigue.   HENT:  Positive for congestion, sinus pressure, sinus pain and sore throat.    Respiratory:  Positive for cough.    Psychiatric/Behavioral:  Positive for decreased concentration. The patient is nervous/anxious.          Objective:  Vitals:    03/15/24 0830   BP: 124/82   BP Location: Left arm   Patient Position: Sitting   Cuff Size: Standard   Pulse: 61   Temp: (!) 97.4 °F (36.3 °C)   SpO2: 98%   Weight: 67.6 kg (149 lb)   Height: 5' 5\" (1.651 m)     Body mass index is 24.79 kg/m².     Physical Exam  Vitals and nursing note reviewed.   Constitutional:       General: She is not in acute distress.     Appearance: Normal appearance. She is not " ill-appearing, toxic-appearing or diaphoretic.   HENT:      Head: Normocephalic and atraumatic.      Right Ear: External ear normal.      Left Ear: External ear normal.   Cardiovascular:      Rate and Rhythm: Normal rate and regular rhythm.      Heart sounds: No murmur heard.     No friction rub.   Pulmonary:      Effort: Pulmonary effort is normal. No respiratory distress.      Breath sounds: Normal breath sounds. No stridor. No wheezing, rhonchi or rales.   Musculoskeletal:         General: No swelling.      Right lower leg: No edema.      Left lower leg: No edema.   Neurological:      General: No focal deficit present.      Mental Status: She is alert. Mental status is at baseline.   Psychiatric:         Attention and Perception: Attention normal.         Mood and Affect: Mood normal.         Speech: Speech normal.         Behavior: Behavior normal.         Thought Content: Thought content normal.         Judgment: Judgment normal.

## 2024-03-19 ENCOUNTER — TELEPHONE (OUTPATIENT)
Dept: FAMILY MEDICINE CLINIC | Facility: CLINIC | Age: 44
End: 2024-03-19

## 2024-03-25 ENCOUNTER — TELEPHONE (OUTPATIENT)
Dept: FAMILY MEDICINE CLINIC | Facility: CLINIC | Age: 44
End: 2024-03-25

## 2024-03-25 NOTE — TELEPHONE ENCOUNTER
Good morning. This is Belen Calling from [place], [place]'s Appeals department calling in regards to an appeal for patient first name Joselin, last name  anam SCHUMACHER Date of birth 1980. Address is [address], [place], [place] [address]. Regarding the appeal for the requested medication Methylphenidate Oral tablet 5 milligrams with a quantity of 60 for 30 days for one total refill calling to request additional information to help support the appeal. In particular, I am looking for the patient's symptoms for the Attention Deficit Hyperactivity Disorder that were made using the rules from the Diagnostic and Statistical Manual of Mental Health Disorders that DSM 5 Guide for Mental Health disorders. If you are able to send over that information, it can be faxed to us, [address], fax number [phone]. If you have any questions or concerns, we can be reached by calling the member service line that is [phone] and then you would ask to be transferred to the appeals department. Thank you very much. Have a good rest of your day.

## 2024-03-25 NOTE — TELEPHONE ENCOUNTER
Reached out to psych from 2/17/2023 and 3/13/2023 and asked for notes to be faxed to forward to the insurance company.

## 2024-03-26 ENCOUNTER — TELEPHONE (OUTPATIENT)
Dept: PSYCHIATRY | Facility: CLINIC | Age: 44
End: 2024-03-26

## 2024-03-26 NOTE — TELEPHONE ENCOUNTER
MR from Family Medicine contacted the office, requesting the patient records get faxed over to their office. The MR explained that the insurance company is requiring prior authorization for the patient's Methylphenidate 5 mg tablets, and they need the medical records to send them to the insurance company. The writer informed the MR that the patient would have to stop by the office and sign a DANIEL before we could send any records.

## 2024-03-28 DIAGNOSIS — F41.9 ANXIETY: ICD-10-CM

## 2024-03-28 RX ORDER — ALPRAZOLAM 0.5 MG/1
0.5 TABLET ORAL DAILY PRN
Qty: 60 TABLET | Refills: 0 | Status: SHIPPED | OUTPATIENT
Start: 2024-03-28

## 2024-04-01 ENCOUNTER — TELEPHONE (OUTPATIENT)
Dept: FAMILY MEDICINE CLINIC | Facility: CLINIC | Age: 44
End: 2024-04-01

## 2024-04-01 NOTE — TELEPHONE ENCOUNTER
Belen from [facility] called to update the office on the patient's Ritalin authorization. She states that this medication was approved by and outside comity after reviewing the case with additional information. They will be cancelling the appeal for this  since the medication has been approved. Authorization number is [id number]. They will be sending official documentation to the office within the next couple days with all of this information.

## 2024-04-02 DIAGNOSIS — F32.0 CURRENT MILD EPISODE OF MAJOR DEPRESSIVE DISORDER, UNSPECIFIED WHETHER RECURRENT (HCC): ICD-10-CM

## 2024-04-02 DIAGNOSIS — Z00.00 ANNUAL PHYSICAL EXAM: ICD-10-CM

## 2024-04-02 DIAGNOSIS — E66.01 CLASS 3 SEVERE OBESITY DUE TO EXCESS CALORIES WITH SERIOUS COMORBIDITY IN ADULT, UNSPECIFIED BMI (HCC): ICD-10-CM

## 2024-04-02 DIAGNOSIS — Z20.9 EXPOSURE TO POTENTIAL INFECTION: ICD-10-CM

## 2024-04-02 RX ORDER — LEVOTHYROXINE SODIUM 0.03 MG/1
25 TABLET ORAL
Qty: 30 TABLET | Refills: 3 | Status: SHIPPED | OUTPATIENT
Start: 2024-04-02

## 2024-04-15 ENCOUNTER — OFFICE VISIT (OUTPATIENT)
Dept: GASTROENTEROLOGY | Facility: CLINIC | Age: 44
End: 2024-04-15
Payer: COMMERCIAL

## 2024-04-15 VITALS
HEIGHT: 65 IN | BODY MASS INDEX: 24.59 KG/M2 | TEMPERATURE: 97.8 F | WEIGHT: 147.6 LBS | OXYGEN SATURATION: 98 % | DIASTOLIC BLOOD PRESSURE: 80 MMHG | SYSTOLIC BLOOD PRESSURE: 118 MMHG | HEART RATE: 80 BPM

## 2024-04-15 DIAGNOSIS — Z86.010 HISTORY OF COLON POLYPS: Primary | ICD-10-CM

## 2024-04-15 DIAGNOSIS — Z12.11 ENCOUNTER FOR SCREENING COLONOSCOPY: ICD-10-CM

## 2024-04-15 PROCEDURE — 99203 OFFICE O/P NEW LOW 30 MIN: CPT | Performed by: PHYSICIAN ASSISTANT

## 2024-04-15 NOTE — PATIENT INSTRUCTIONS
Scheduled date of colonoscopy (as of today):5/24/24  Physician performing colonoscopy:Hitesh  Location of colonoscopy:[facility]  Bowel prep reviewed with patient:miralax/dulcolax  Instructions reviewed with patient by:Mattie AVILA  Clearances:  none    Colonoscopy   AMBULATORY CARE:   What you need to know about a colonoscopy:  A colonoscopy is a procedure to examine the inside of your colon (intestine) with a scope. A scope is a flexible tube with a small light and camera on the end. Polyps or tissue growths may be removed during your colonoscopy.       What you need to do the week before your colonoscopy:  Give your healthcare provider a list of all the medicines, supplements, and herbs you take. You will need to stop taking medicines that contain aspirin or iron for 7 days before your colonoscopy. If you take a blood thinner, such as warfarin, ask when you should stop taking it. Make plans for someone to drive you home after your procedure.  How to prepare for your colonoscopy:  Your healthcare provider will have you prepare your bowels before your procedure. It is important for your bowels to be empty before your procedure to allow him or her to see your colon clearly. You will need to do the following:  Have only clear liquids for the entire day before your colonoscopy.  Clear liquids include plain gelatin, unsweetened fruit juices, clear soup, and broth. Do not drink any liquid that is blue, red, or purple.    Follow your bowel prep as directed.  There are many different preparations that can be given before a colonoscopy. With any bowel prep, stay close to the bathroom. This prep will cause your bowels to move often.    Use an enema if directed.  Your healthcare provider may tell you to use an enema to help clean out your bowels.    Do not eat or drink anything after midnight.  This will help prevent problems that can happen if you vomit while under anesthesia.    What will happen during your colonoscopy:   You  will be given medicine to help you relax. You will lie on your left side and raise one or both knees toward your chest. Your healthcare provider will examine your anus and use a gloved finger to check your rectum. You may need another enema if your bowel is not empty. The scope will be lubricated and gently placed into your anus. It will then be passed through your rectum and into your colon. Water or air will be put into your colon to help clean or expand it. This is done so your healthcare provider can see your colon clearly.     Tissue samples may be taken from the walls of your bowel and sent to a lab for tests. If you have a polyp, your healthcare provider will pass a wire loop through the scope and use it to hold the polyp. The polyp is then removed from the wall of your colon. You should not feel this. The polyps are sent to a lab for tests. Pictures of your colon may be taken during the procedure.    What will happen after your colonoscopy:  You may feel bloated or have some gas and abdominal discomfort. You may need to lie on your left side with a heating pad on your abdomen. Eat small meals until your bloating has improved.  Risks of a colonoscopy:  You may have pain or bleeding after the scope or polyps are removed. You may also have a slow heartbeat, decreased blood pressure, or increased sweating. Your colon may tear due to the increased pressure from the scope and other instruments. This may cause bowel contents to leak out of your colon and into your abdomen. If this happens, you will need to have surgery on your colon.   Seek care immediately if:   You have a large amount of bright red blood in your bowel movements.    Your abdomen is hard and firm and you have severe pain.    You have sudden trouble breathing.    Call your doctor if:   You develop a rash or hives.    You have a fever within 24 hours of your procedure.    You have not had a bowel movement for 3 days after your procedure.    You have  questions or concerns about your condition or care.    After your colonoscopy:   Do not lift, strain, or run  until your healthcare provider says it is okay.    Rest as much as possible.  You have been given medicine to relax you. Do not  drive or make important decisions for at least 24 hours. Return to your normal activity as directed.    Relieve gas and discomfort from bloating  by lying on your left side with a heating pad on your abdomen. You may need to take short walks to help the gas move out. Eat small meals until bloating is relieved.    If you had polyps removed:  For 7 days after your procedure:  Do not  take aspirin.    Do not  go on long car rides.    Help prevent constipation:   Eat a variety of healthy foods.  Healthy foods include fruit, vegetables, whole-grain breads, low-fat dairy products, beans, lean meat, and fish. Ask if you need to be on a special diet. Your healthcare provider may recommend that you eat high-fiber foods such as cooked beans. Fiber helps you have regular bowel movements.    Drink liquids as directed.  Adults should drink between 9 and 13 eight-ounce cups of liquid every day. Ask what amount is best for you. For most people, good liquids to drink are water, juice, and milk.     Exercise as directed.  Talk to your healthcare provider about the best exercise plan for you. Exercise can help prevent constipation, decrease your blood pressure and improve your health.    Follow up with your doctor as directed:  Write down your questions so you remember to ask them during your visits.   © Copyright [employer] 2023 Information is for End User's use only and may not be sold, redistributed or otherwise used for commercial purposes.  The above information is an  only. It is not intended as medical advice for individual conditions or treatments. Talk to your doctor, nurse or pharmacist before following any medical regimen to see if it is safe and effective for you.

## 2024-04-15 NOTE — PROGRESS NOTES
[facility] - Outpatient Consultation  Joselin Diamond 43 y.o. female MRN: [id number]  Encounter: [id number]          ASSESSMENT AND PLAN:      1. Encounter for screening colonoscopy  2. History of colon polyps  Will schedule colonoscopy at this time.    Patient will continue probiotic daily.    I obtained informed consent from the patient. The risks/benefits/alternatives of the procedure were discussed with the patient. Risks included, but not limited to, infection, bleeding, perforation, injury to organs in the abdomen, missed lesion and incomplete procedure were discussed. Patient was agreeable and electronic signature was obtained.   ______________________________________________________________________    HPI:    43-year-old female with a past medical history significant for Hashimoto's, psoriatic arthritis, bipolar disorder, obesity status post gastric bypass who presents to the GI clinic today for consultation.  Patient reports that she is here to schedule colonoscopy.  Patient reports that she had a colonoscopy performed about 5 years ago and she did have colon polyps at that time.  She reports that she most recently received a letter to have a repeat examination done.  She reports that her maternal uncle had a history of colon cancer in her father has a history of colon polyps.  Patient reports that she does suffer with occasional episodes of diarrhea and loose stool.  Patient is status post cholecystectomy.  Patient denies any rectal bleeding or melena.  Patient does report abdominal gas.  She is currently taking probiotic daily.  She reports that she does try to eat multiple meals throughout the course of the day.  She does try to eat high fiber.    REVIEW OF SYSTEMS:    CONSTITUTIONAL: Denies any fever, chills, rigors, and weight loss.  HEENT: No earache or tinnitus. Denies hearing loss or visual disturbances.  CARDIOVASCULAR: No chest pain or palpitations.   RESPIRATORY:  Denies any cough, hemoptysis, shortness of breath or dyspnea on exertion.  GASTROINTESTINAL: As noted in the History of Present Illness.   GENITOURINARY: No problems with urination. Denies any hematuria or dysuria.  NEUROLOGIC: No dizziness or vertigo, denies headaches.   MUSCULOSKELETAL: Denies any muscle or joint pain.   SKIN: Denies skin rashes or itching.   ENDOCRINE: Denies excessive thirst. Denies intolerance to heat or cold.  PSYCHOSOCIAL: Denies depression or anxiety. Denies any recent memory loss.       Historical Information   Past Medical History:   Diagnosis Date    ADHD (attention deficit hyperactivity disorder)     Anxiety     Chronic pain disorder 2016    Ongoing fibromyalgia and psoartic arthritis    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    Colon polyp     Depression     Disease of thyroid gland     Ear problems     Fatty liver     GERD (gastroesophageal reflux disease)     Hashimoto's disease     Headache(784.0) Since i was young    On going    Heart murmur     Mitral valve prolapse     Obesity (BMI 30-39.9)     Obsessive-compulsive disorder     Ocd tendencies never dx    Panic attack     PONV (postoperative nausea and vomiting)     Prothrombin gene mutation (HCC)     Factor II    Psoriatic arthritis (HCC)     Sleep difficulties     SVT (supraventricular tachycardia)     Varicosities of leg      Past Surgical History:   Procedure Laterality Date    CARDIAC ELECTROPHYSIOLOGY STUDY AND ABLATION      X 2 for SVT    CARDIAC SURGERY  2017 and 2018    cardiac ablation    CERVICAL BIOPSY  W/ LOOP ELECTRODE EXCISION  2004    CHOLECYSTECTOMY      COLONOSCOPY      GASTRIC BYPASS  06/29/2023    ID TONSILLECTOMY & ADENOIDECTOMY AGE 12/> N/A 12/10/2021    Procedure: TONSILLECTOMY & ADENOIDECTOMY;  Surgeon: Carmine Wilosn MD;  Location: [facility];  Service: ENT    TONSILLECTOMY  9/2021    WISDOM TOOTH EXTRACTION       Social History   Social History     Substance and Sexual Activity  "  Alcohol Use Not Currently    Comment: rare Socially     Social History     Substance and Sexual Activity   Drug Use Never     Social History     Tobacco Use   Smoking Status Former    Current packs/day: 0.00    Average packs/day: 0.5 packs/day for 22.7 years (11.4 ttl pk-yrs)    Types: Cigarettes    Start date:     Quit date: 2016    Years since quittin.5   Smokeless Tobacco Never     Family History   Problem Relation Age of Onset    Hypothyroidism Mother     Varicose Veins Mother     Asthma Mother     Hypothyroidism Father     Heart disease Father     Heart attack Father 47    Emphysema Father     COPD Father         former smoker    Heart failure Father         heart attack    Varicose Veins Sister     Diabetes Maternal Grandmother     Psoriasis Paternal Grandmother     Diabetes Paternal Grandmother     Anemia Cousin        Meds/Allergies       Current Outpatient Medications:     ALPRAZolam (XANAX) 0.5 mg tablet    Enbrel SureClick 50 MG/ML injection    escitalopram (Lexapro) 20 mg tablet    levothyroxine 25 mcg tablet    methylphenidate (Ritalin) 5 mg tablet    pantoprazole (PROTONIX) 20 mg tablet    Allergies   Allergen Reactions    Nickel Hives, Itching and Rash     \"Localized\"           Objective     Blood pressure 118/80, pulse 80, temperature 97.8 °F (36.6 °C), temperature source Temporal, height 5' 5\" (1.651 m), weight 67 kg (147 lb 9.6 oz), SpO2 98%. Body mass index is 24.56 kg/m².        PHYSICAL EXAM:      General Appearance:   Alert, cooperative, no distress   HEENT:   Normocephalic, atraumatic, anicteric.     Neck:  Supple, symmetrical, trachea midline   Lungs:   Clear to auscultation bilaterally; no rales, rhonchi or wheezing; respirations unlabored    Heart::   Regular rate and rhythm; no murmur, rub, or gallop.   Abdomen:   Soft, non-tender, non-distended; normal bowel sounds; no masses, no organomegaly    Genitalia:   Deferred    Rectal:   Deferred    Extremities:  No cyanosis, " clubbing or edema    Pulses:  2+ and symmetric    Skin:  No jaundice, rashes, or lesions    Lymph nodes:  No palpable cervical lymphadenopathy        Lab Results:   No visits with results within 1 Day(s) from this visit.   Latest known visit with results is:   Orders Only on 02/03/2023   Component Date Value    Hemoglobin A1C 02/03/2023 5.5          Radiology Results:   No results found.Answers submitted by the patient for this visit:  Abdominal Pain Questionnaire (Submitted on 4/8/2024)  Chief Complaint: Abdominal pain  Progression since onset: resolved  anorexia: No  arthralgias: Yes  belching: No  constipation: Yes  diarrhea: Yes  dysuria: No  fever: No  flatus: Yes  frequency: No  headaches: No  hematochezia: No  hematuria: No  melena: No  myalgias: No  nausea: No  weight loss: No  vomiting: No

## 2024-05-01 PROBLEM — J01.00 ACUTE NON-RECURRENT MAXILLARY SINUSITIS: Status: RESOLVED | Noted: 2022-11-01 | Resolved: 2024-05-01

## 2024-05-03 ENCOUNTER — TELEPHONE (OUTPATIENT)
Age: 44
End: 2024-05-03

## 2024-05-23 DIAGNOSIS — F41.9 ANXIETY: ICD-10-CM

## 2024-05-23 DIAGNOSIS — F90.2 ADHD (ATTENTION DEFICIT HYPERACTIVITY DISORDER), COMBINED TYPE: ICD-10-CM

## 2024-05-24 RX ORDER — METHYLPHENIDATE HYDROCHLORIDE 5 MG/1
5 TABLET ORAL
Qty: 60 TABLET | Refills: 0 | Status: SHIPPED | OUTPATIENT
Start: 2024-05-24

## 2024-05-24 RX ORDER — ALPRAZOLAM 0.5 MG/1
0.5 TABLET ORAL DAILY PRN
Qty: 60 TABLET | Refills: 0 | Status: SHIPPED | OUTPATIENT
Start: 2024-05-24

## 2024-06-06 RX ORDER — LIDOCAINE HYDROCHLORIDE 10 MG/ML
0.5 INJECTION, SOLUTION EPIDURAL; INFILTRATION; INTRACAUDAL; PERINEURAL ONCE AS NEEDED
Status: CANCELLED | OUTPATIENT
Start: 2024-06-06

## 2024-06-06 RX ORDER — SODIUM CHLORIDE, SODIUM LACTATE, POTASSIUM CHLORIDE, CALCIUM CHLORIDE 600; 310; 30; 20 MG/100ML; MG/100ML; MG/100ML; MG/100ML
50 INJECTION, SOLUTION INTRAVENOUS CONTINUOUS
Status: CANCELLED | OUTPATIENT
Start: 2024-06-06

## 2024-06-07 ENCOUNTER — ANESTHESIA EVENT (OUTPATIENT)
Dept: GASTROENTEROLOGY | Facility: HOSPITAL | Age: 44
End: 2024-06-07

## 2024-06-07 ENCOUNTER — ANESTHESIA (OUTPATIENT)
Dept: GASTROENTEROLOGY | Facility: HOSPITAL | Age: 44
End: 2024-06-07

## 2024-06-07 ENCOUNTER — HOSPITAL ENCOUNTER (OUTPATIENT)
Dept: GASTROENTEROLOGY | Facility: HOSPITAL | Age: 44
Setting detail: OUTPATIENT SURGERY
End: 2024-06-07
Payer: COMMERCIAL

## 2024-06-07 VITALS
OXYGEN SATURATION: 100 % | WEIGHT: 138.89 LBS | TEMPERATURE: 98.1 F | HEIGHT: 65 IN | DIASTOLIC BLOOD PRESSURE: 61 MMHG | BODY MASS INDEX: 23.14 KG/M2 | SYSTOLIC BLOOD PRESSURE: 98 MMHG | RESPIRATION RATE: 18 BRPM | HEART RATE: 51 BPM

## 2024-06-07 DIAGNOSIS — Z86.010 HISTORY OF COLON POLYPS: ICD-10-CM

## 2024-06-07 LAB
EXT PREGNANCY TEST URINE: NEGATIVE
EXT. CONTROL: NORMAL

## 2024-06-07 PROCEDURE — 81025 URINE PREGNANCY TEST: CPT | Performed by: ANESTHESIOLOGY

## 2024-06-07 PROCEDURE — 45378 DIAGNOSTIC COLONOSCOPY: CPT | Performed by: INTERNAL MEDICINE

## 2024-06-07 RX ORDER — LIDOCAINE HYDROCHLORIDE 10 MG/ML
0.5 INJECTION, SOLUTION EPIDURAL; INFILTRATION; INTRACAUDAL; PERINEURAL ONCE AS NEEDED
Status: ACTIVE | OUTPATIENT
Start: 2024-06-07

## 2024-06-07 RX ORDER — LIDOCAINE HYDROCHLORIDE 20 MG/ML
INJECTION, SOLUTION EPIDURAL; INFILTRATION; INTRACAUDAL; PERINEURAL AS NEEDED
Status: DISCONTINUED | OUTPATIENT
Start: 2024-06-07 | End: 2024-06-07

## 2024-06-07 RX ORDER — SODIUM CHLORIDE, SODIUM LACTATE, POTASSIUM CHLORIDE, CALCIUM CHLORIDE 600; 310; 30; 20 MG/100ML; MG/100ML; MG/100ML; MG/100ML
50 INJECTION, SOLUTION INTRAVENOUS CONTINUOUS
Status: DISPENSED | OUTPATIENT
Start: 2024-06-07

## 2024-06-07 RX ORDER — PROPOFOL 10 MG/ML
INJECTION, EMULSION INTRAVENOUS AS NEEDED
Status: DISCONTINUED | OUTPATIENT
Start: 2024-06-07 | End: 2024-06-07

## 2024-06-07 RX ADMIN — PROPOFOL 30 MG: 10 INJECTION, EMULSION INTRAVENOUS at 09:39

## 2024-06-07 RX ADMIN — PROPOFOL 150 MG: 10 INJECTION, EMULSION INTRAVENOUS at 09:35

## 2024-06-07 RX ADMIN — SODIUM CHLORIDE, SODIUM LACTATE, POTASSIUM CHLORIDE, AND CALCIUM CHLORIDE 50 ML/HR: .6; .31; .03; .02 INJECTION, SOLUTION INTRAVENOUS at 08:24

## 2024-06-07 RX ADMIN — PROPOFOL 30 MG: 10 INJECTION, EMULSION INTRAVENOUS at 09:37

## 2024-06-07 RX ADMIN — LIDOCAINE HYDROCHLORIDE 100 MG: 20 INJECTION, SOLUTION EPIDURAL; INFILTRATION; INTRACAUDAL; PERINEURAL at 09:35

## 2024-06-07 NOTE — ANESTHESIA PREPROCEDURE EVALUATION
Procedure:  COLONOSCOPY    Relevant Problems   MUSCULOSKELETAL   (+) Fibromyalgia affecting multiple sites   (+) Psoriatic arthritis (HCC)      NEURO/PSYCH   (+) Fibromyalgia affecting multiple sites   (+) MIKE (generalized anxiety disorder)   (+) MDD (major depressive disorder)      Endocrine   (+) Hashimoto's disease      Surgery/Wound/Pain   (+) S/P bariatric surgery      Hx of SVT status post ablation x 2    Hx of PONV    Denies recent fever, cough or other symptom of upper respiratory tract infection.    Confirmed NPO appropriate    Cormack I with Mac 3 on 629/23    Physical Exam    Airway    Mallampati score: II  TM Distance: >3 FB  Neck ROM: full     Dental   No notable dental hx     Cardiovascular      Pulmonary      Other Findings  post-pubertal.      6/21/22 TTE: Normal biventricular systolic function. No significant valvular disease.    Anesthesia Plan  ASA Score- 2     Anesthesia Type- IV sedation with anesthesia with ASA Monitors.         Additional Monitors:     Airway Plan:            Plan Factors-Exercise tolerance (METS): >4 METS.Exercise comment: Able to climb two flights of stairs without cardiopulmonary limitation.    Chart reviewed.   Existing labs reviewed.     Patient is not a current smoker.              Induction- intravenous.    Postoperative Plan-     Perioperative Resuscitation Plan - Level 1 - Full Code.       Informed Consent- Anesthetic plan and risks discussed with patient.

## 2024-06-07 NOTE — ANESTHESIA POSTPROCEDURE EVALUATION
Post-Op Assessment Note    CV Status:  Stable  Pain Score: 0    Pain management: adequate       Mental Status:  Alert and awake   Airway Patency:  Patent  Two or more mitigation strategies used for obstructive sleep apnea   Post Op Vitals Reviewed: Yes    No anethesia notable event occurred.                BP      Temp      Pulse     Resp      SpO2

## 2024-06-07 NOTE — H&P
History and Physical -  [facility]  Joselin Diamond 43 y.o. female MRN: [id number]                  HPI: Joselin Diamond is a 43 y.o. year old female who presents for colonoscopy for history of colon polyps      REVIEW OF SYSTEMS: Per the HPI, and otherwise unremarkable.    Historical Information   Past Medical History:   Diagnosis Date    ADHD (attention deficit hyperactivity disorder)     Anxiety     Chronic pain disorder 2016    Ongoing fibromyalgia and psoartic arthritis    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    Colon polyp     Depression     Disease of thyroid gland     Ear problems     Fatty liver     GERD (gastroesophageal reflux disease)     Hashimoto's disease     Headache(784.0) Since i was young    On going    Heart murmur     Mitral valve prolapse     Obesity (BMI 30-39.9)     Obsessive-compulsive disorder     Ocd tendencies never dx    Panic attack     PONV (postoperative nausea and vomiting)     Prothrombin gene mutation (HCC)     Factor II    Psoriatic arthritis (HCC)     Sleep difficulties     SVT (supraventricular tachycardia)     Varicosities of leg      Past Surgical History:   Procedure Laterality Date    CARDIAC ELECTROPHYSIOLOGY STUDY AND ABLATION      X 2 for SVT    CARDIAC SURGERY  2017 and 2018    cardiac ablation    CERVICAL BIOPSY  W/ LOOP ELECTRODE EXCISION  2004    CHOLECYSTECTOMY      COLONOSCOPY      GASTRIC BYPASS  06/29/2023    UT TONSILLECTOMY & ADENOIDECTOMY AGE 12/> N/A 12/10/2021    Procedure: TONSILLECTOMY & ADENOIDECTOMY;  Surgeon: Carmine Wilson MD;  Location: [facility];  Service: ENT    TONSILLECTOMY  9/2021    WISDOM TOOTH EXTRACTION       Social History   Social History     Substance and Sexual Activity   Alcohol Use Not Currently    Comment: rare Socially     Social History     Substance and Sexual Activity   Drug Use Never     Social History     Tobacco Use   Smoking Status Former    Current packs/day: 0.00    Average  "packs/day: 0.5 packs/day for 22.7 years (11.4 ttl pk-yrs)    Types: Cigarettes    Start date:     Quit date: 2016    Years since quittin.7   Smokeless Tobacco Never     Family History   Problem Relation Age of Onset    Hypothyroidism Mother     Varicose Veins Mother     Asthma Mother     Hypothyroidism Father     Heart disease Father     Heart attack Father 47    Emphysema Father     COPD Father         former smoker    Heart failure Father         heart attack    Varicose Veins Sister     Diabetes Maternal Grandmother     Psoriasis Paternal Grandmother     Diabetes Paternal Grandmother     Anemia Cousin        Meds/Allergies     Not in a hospital admission.    Allergies   Allergen Reactions    Nickel Hives, Itching and Rash     \"Localized\"       Objective     Blood pressure 118/68, pulse (!) 51, temperature 97.8 °F (36.6 °C), temperature source Temporal, resp. rate 18, height 5' 5\" (1.651 m), weight 63 kg (138 lb 14.2 oz), SpO2 100%.      PHYSICAL EXAM    /68   Pulse (!) 51   Temp 97.8 °F (36.6 °C) (Temporal)   Resp 18   Ht 5' 5\" (1.651 m)   Wt 63 kg (138 lb 14.2 oz)   SpO2 100%   BMI 23.11 kg/m²       Gen: NAD  CV: RRR  CHEST: Clear  ABD: soft, NT/ND  EXT: no edema      ASSESSMENT/PLAN:  This is a 43 y.o. year old female here for colonoscopy, and she is stable and optimized for her procedure.        "

## 2024-06-20 DIAGNOSIS — F41.9 ANXIETY: ICD-10-CM

## 2024-06-20 RX ORDER — ESCITALOPRAM OXALATE 20 MG/1
20 TABLET ORAL DAILY
Qty: 90 TABLET | Refills: 1 | Status: SHIPPED | OUTPATIENT
Start: 2024-06-20

## 2024-07-24 DIAGNOSIS — Z20.9 EXPOSURE TO POTENTIAL INFECTION: ICD-10-CM

## 2024-07-24 DIAGNOSIS — F32.0 CURRENT MILD EPISODE OF MAJOR DEPRESSIVE DISORDER, UNSPECIFIED WHETHER RECURRENT (HCC): ICD-10-CM

## 2024-07-24 DIAGNOSIS — E66.01 CLASS 3 SEVERE OBESITY DUE TO EXCESS CALORIES WITH SERIOUS COMORBIDITY IN ADULT, UNSPECIFIED BMI (HCC): ICD-10-CM

## 2024-07-24 DIAGNOSIS — Z00.00 ANNUAL PHYSICAL EXAM: ICD-10-CM

## 2024-07-24 RX ORDER — LEVOTHYROXINE SODIUM 0.03 MG/1
25 TABLET ORAL
Qty: 30 TABLET | Refills: 0 | Status: SHIPPED | OUTPATIENT
Start: 2024-07-24

## 2024-07-24 NOTE — TELEPHONE ENCOUNTER
Patient needs updated blood work. Please place orders. A courtesy refill was provided.      Patient needs updated TSH

## 2024-08-25 DIAGNOSIS — F90.2 ADHD (ATTENTION DEFICIT HYPERACTIVITY DISORDER), COMBINED TYPE: ICD-10-CM

## 2024-08-25 DIAGNOSIS — F41.9 ANXIETY: ICD-10-CM

## 2024-08-27 DIAGNOSIS — Z00.00 ANNUAL PHYSICAL EXAM: ICD-10-CM

## 2024-08-27 DIAGNOSIS — E66.01 CLASS 3 SEVERE OBESITY DUE TO EXCESS CALORIES WITH SERIOUS COMORBIDITY IN ADULT, UNSPECIFIED BMI (HCC): ICD-10-CM

## 2024-08-27 DIAGNOSIS — Z20.9 EXPOSURE TO POTENTIAL INFECTION: ICD-10-CM

## 2024-08-27 DIAGNOSIS — F32.0 CURRENT MILD EPISODE OF MAJOR DEPRESSIVE DISORDER, UNSPECIFIED WHETHER RECURRENT (HCC): ICD-10-CM

## 2024-08-27 RX ORDER — ALPRAZOLAM 0.5 MG
0.5 TABLET ORAL DAILY PRN
Qty: 60 TABLET | Refills: 0 | Status: SHIPPED | OUTPATIENT
Start: 2024-08-27

## 2024-08-27 RX ORDER — METHYLPHENIDATE HYDROCHLORIDE 5 MG/1
5 TABLET ORAL
Qty: 60 TABLET | Refills: 0 | Status: SHIPPED | OUTPATIENT
Start: 2024-08-27

## 2024-08-28 RX ORDER — LEVOTHYROXINE SODIUM 25 UG/1
25 TABLET ORAL
Qty: 30 TABLET | Refills: 0 | Status: SHIPPED | OUTPATIENT
Start: 2024-08-28

## 2024-09-25 ENCOUNTER — OFFICE VISIT (OUTPATIENT)
Dept: FAMILY MEDICINE CLINIC | Facility: CLINIC | Age: 44
End: 2024-09-25
Payer: COMMERCIAL

## 2024-09-25 VITALS
DIASTOLIC BLOOD PRESSURE: 72 MMHG | SYSTOLIC BLOOD PRESSURE: 118 MMHG | WEIGHT: 143.6 LBS | TEMPERATURE: 98 F | OXYGEN SATURATION: 98 % | HEART RATE: 61 BPM | HEIGHT: 65 IN | BODY MASS INDEX: 23.93 KG/M2

## 2024-09-25 DIAGNOSIS — F90.2 ADHD (ATTENTION DEFICIT HYPERACTIVITY DISORDER), COMBINED TYPE: Primary | ICD-10-CM

## 2024-09-25 DIAGNOSIS — E06.3 HASHIMOTO'S DISEASE: ICD-10-CM

## 2024-09-25 DIAGNOSIS — Z98.84 S/P BARIATRIC SURGERY: ICD-10-CM

## 2024-09-25 PROCEDURE — 99214 OFFICE O/P EST MOD 30 MIN: CPT | Performed by: FAMILY MEDICINE

## 2024-09-25 NOTE — PROGRESS NOTES
"Ambulatory Visit  Name: Joselin Diamond      : 1980      MRN: [id number]  Encounter Provider: Beth Swanson MD  Encounter Date: 2024   Encounter department: [facility]    Assessment & Plan  ADHD (attention deficit hyperactivity disorder), combined type  Doing okay on ritalin, will continue same dose        S/P bariatric surgery  Check labs   Orders:    CBC and differential; Future    Comprehensive metabolic panel; Future    Iron; Future    Lipid Panel with Direct LDL reflex; Future    Vitamin D 25 hydroxy; Future    Vitamin B12; Future    TSH, 3rd generation; Future    Hashimoto's disease  Update TSH   Orders:    TSH, 3rd generation; Future       History of Present Illness     44 year old w/ history of ADHD on Ritalin here for a check up. She says she is doing ok on this. She does have chronic fatigue, she has trouble falling asleep. She has tried several things for sleep and has been to sleep medicine.  She is on Lexapro and Xanax as needed for depression/anxiety. 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   She is due for her bariatric labs.           Review of Systems   Constitutional:  Positive for fatigue. Negative for activity change.   Respiratory:  Negative for chest tightness and shortness of breath.    Cardiovascular:  Negative for chest pain and leg swelling.   Neurological:  Negative for headaches.   Psychiatric/Behavioral:  Positive for sleep disturbance. Negative for dysphoric mood. The patient is not nervous/anxious.            Objective     /72 (BP Location: Left arm, Patient Position: Sitting)   Pulse 61   Temp 98 °F (36.7 °C) (Temporal)   Ht 5' 5\" (1.651 m)   Wt 65.1 kg (143 lb 9.6 oz)   SpO2 98%   BMI 23.90 kg/m²     Physical Exam  Vitals and nursing note reviewed.   Constitutional:       General: She is not in acute distress.     Appearance: She is well-developed. She is not diaphoretic.   HENT:     "  Head: Normocephalic and atraumatic.      Right Ear: External ear normal.      Left Ear: External ear normal.   Eyes:      Conjunctiva/sclera: Conjunctivae normal.   Cardiovascular:      Rate and Rhythm: Normal rate and regular rhythm.      Heart sounds: Normal heart sounds. No murmur heard.     No friction rub. No gallop.   Pulmonary:      Effort: Pulmonary effort is normal. No respiratory distress.      Breath sounds: Normal breath sounds. No stridor. No wheezing or rales.   Chest:      Chest wall: No tenderness.   Abdominal:      General: There is no distension.      Palpations: Abdomen is soft.      Tenderness: There is no abdominal tenderness.   Musculoskeletal:      Right lower leg: No edema.      Left lower leg: No edema.   Neurological:      General: No focal deficit present.      Mental Status: She is alert.   Psychiatric:         Mood and Affect: Mood normal.         Behavior: Behavior normal.         Thought Content: Thought content normal.         Judgment: Judgment normal.

## 2024-09-25 NOTE — ASSESSMENT & PLAN NOTE
Check labs   Orders:    CBC and differential; Future    Comprehensive metabolic panel; Future    Iron; Future    Lipid Panel with Direct LDL reflex; Future    Vitamin D 25 hydroxy; Future    Vitamin B12; Future    TSH, 3rd generation; Future

## 2024-10-02 DIAGNOSIS — F32.0 CURRENT MILD EPISODE OF MAJOR DEPRESSIVE DISORDER, UNSPECIFIED WHETHER RECURRENT (HCC): ICD-10-CM

## 2024-10-02 DIAGNOSIS — Z00.00 ANNUAL PHYSICAL EXAM: ICD-10-CM

## 2024-10-02 DIAGNOSIS — Z20.9 EXPOSURE TO POTENTIAL INFECTION: ICD-10-CM

## 2024-10-02 DIAGNOSIS — E66.813 CLASS 3 SEVERE OBESITY DUE TO EXCESS CALORIES WITH SERIOUS COMORBIDITY IN ADULT, UNSPECIFIED BMI (HCC): ICD-10-CM

## 2024-10-02 DIAGNOSIS — E66.01 CLASS 3 SEVERE OBESITY DUE TO EXCESS CALORIES WITH SERIOUS COMORBIDITY IN ADULT, UNSPECIFIED BMI (HCC): ICD-10-CM

## 2024-10-02 RX ORDER — LEVOTHYROXINE SODIUM 25 UG/1
25 TABLET ORAL
Qty: 30 TABLET | Refills: 0 | Status: SHIPPED | OUTPATIENT
Start: 2024-10-02

## 2024-10-07 DIAGNOSIS — F41.9 ANXIETY: ICD-10-CM

## 2024-10-08 RX ORDER — ALPRAZOLAM 0.5 MG
0.5 TABLET ORAL DAILY PRN
Qty: 60 TABLET | Refills: 0 | Status: SHIPPED | OUTPATIENT
Start: 2024-10-08

## 2024-10-26 ENCOUNTER — APPOINTMENT (OUTPATIENT)
Dept: LAB | Facility: CLINIC | Age: 44
End: 2024-10-26
Payer: COMMERCIAL

## 2024-10-26 DIAGNOSIS — Z98.84 S/P BARIATRIC SURGERY: ICD-10-CM

## 2024-10-26 DIAGNOSIS — Z11.1 SCREENING EXAMINATION FOR PULMONARY TUBERCULOSIS: ICD-10-CM

## 2024-10-26 LAB
25(OH)D3 SERPL-MCNC: 34 NG/ML (ref 30–100)
ALBUMIN SERPL BCG-MCNC: 4.4 G/DL (ref 3.5–5)
ALP SERPL-CCNC: 84 U/L (ref 34–104)
ALT SERPL W P-5'-P-CCNC: 16 U/L (ref 7–52)
ANION GAP SERPL CALCULATED.3IONS-SCNC: 8 MMOL/L (ref 4–13)
AST SERPL W P-5'-P-CCNC: 16 U/L (ref 13–39)
BASOPHILS # BLD AUTO: 0.06 THOUSANDS/ΜL (ref 0–0.1)
BASOPHILS NFR BLD AUTO: 1 % (ref 0–1)
BILIRUB SERPL-MCNC: 1.49 MG/DL (ref 0.2–1)
BUN SERPL-MCNC: 10 MG/DL (ref 5–25)
CALCIUM SERPL-MCNC: 9.1 MG/DL (ref 8.4–10.2)
CHLORIDE SERPL-SCNC: 104 MMOL/L (ref 96–108)
CO2 SERPL-SCNC: 30 MMOL/L (ref 21–32)
CREAT SERPL-MCNC: 0.54 MG/DL (ref 0.6–1.3)
EOSINOPHIL # BLD AUTO: 0.03 THOUSAND/ΜL (ref 0–0.61)
EOSINOPHIL NFR BLD AUTO: 1 % (ref 0–6)
ERYTHROCYTE [DISTWIDTH] IN BLOOD BY AUTOMATED COUNT: 11.7 % (ref 11.6–15.1)
FERRITIN SERPL-MCNC: 55 NG/ML (ref 11–307)
GFR SERPL CREATININE-BSD FRML MDRD: 115 ML/MIN/1.73SQ M
GLUCOSE P FAST SERPL-MCNC: 82 MG/DL (ref 65–99)
HCT VFR BLD AUTO: 43.4 % (ref 34.8–46.1)
HGB BLD-MCNC: 14.6 G/DL (ref 11.5–15.4)
IMM GRANULOCYTES # BLD AUTO: 0.01 THOUSAND/UL (ref 0–0.2)
IMM GRANULOCYTES NFR BLD AUTO: 0 % (ref 0–2)
IRON SERPL-MCNC: 135 UG/DL (ref 50–212)
LYMPHOCYTES # BLD AUTO: 1.71 THOUSANDS/ΜL (ref 0.6–4.47)
LYMPHOCYTES NFR BLD AUTO: 27 % (ref 14–44)
MCH RBC QN AUTO: 31.8 PG (ref 26.8–34.3)
MCHC RBC AUTO-ENTMCNC: 33.6 G/DL (ref 31.4–37.4)
MCV RBC AUTO: 95 FL (ref 82–98)
MONOCYTES # BLD AUTO: 0.33 THOUSAND/ΜL (ref 0.17–1.22)
MONOCYTES NFR BLD AUTO: 5 % (ref 4–12)
NEUTROPHILS # BLD AUTO: 4.29 THOUSANDS/ΜL (ref 1.85–7.62)
NEUTS SEG NFR BLD AUTO: 66 % (ref 43–75)
NRBC BLD AUTO-RTO: 0 /100 WBCS
PLATELET # BLD AUTO: 203 THOUSANDS/UL (ref 149–390)
PMV BLD AUTO: 10.4 FL (ref 8.9–12.7)
POTASSIUM SERPL-SCNC: 4.3 MMOL/L (ref 3.5–5.3)
PROT SERPL-MCNC: 6.8 G/DL (ref 6.4–8.4)
RBC # BLD AUTO: 4.59 MILLION/UL (ref 3.81–5.12)
SODIUM SERPL-SCNC: 142 MMOL/L (ref 135–147)
TSH SERPL DL<=0.05 MIU/L-ACNC: 2.58 UIU/ML (ref 0.45–4.5)
VIT B12 SERPL-MCNC: 424 PG/ML (ref 180–914)
WBC # BLD AUTO: 6.43 THOUSAND/UL (ref 4.31–10.16)

## 2024-10-26 PROCEDURE — 82306 VITAMIN D 25 HYDROXY: CPT

## 2024-10-26 PROCEDURE — 82728 ASSAY OF FERRITIN: CPT

## 2024-10-26 PROCEDURE — 85025 COMPLETE CBC W/AUTO DIFF WBC: CPT

## 2024-10-26 PROCEDURE — 80053 COMPREHEN METABOLIC PANEL: CPT

## 2024-10-26 PROCEDURE — 84443 ASSAY THYROID STIM HORMONE: CPT

## 2024-10-26 PROCEDURE — 83540 ASSAY OF IRON: CPT

## 2024-10-26 PROCEDURE — 36415 COLL VENOUS BLD VENIPUNCTURE: CPT

## 2024-10-26 PROCEDURE — 82607 VITAMIN B-12: CPT

## 2024-11-03 DIAGNOSIS — Z00.00 ANNUAL PHYSICAL EXAM: ICD-10-CM

## 2024-11-03 DIAGNOSIS — F32.0 CURRENT MILD EPISODE OF MAJOR DEPRESSIVE DISORDER, UNSPECIFIED WHETHER RECURRENT (HCC): ICD-10-CM

## 2024-11-03 DIAGNOSIS — Z20.9 EXPOSURE TO POTENTIAL INFECTION: ICD-10-CM

## 2024-11-03 DIAGNOSIS — E66.813 CLASS 3 SEVERE OBESITY DUE TO EXCESS CALORIES WITH SERIOUS COMORBIDITY IN ADULT, UNSPECIFIED BMI (HCC): ICD-10-CM

## 2024-11-03 DIAGNOSIS — E66.01 CLASS 3 SEVERE OBESITY DUE TO EXCESS CALORIES WITH SERIOUS COMORBIDITY IN ADULT, UNSPECIFIED BMI (HCC): ICD-10-CM

## 2024-11-04 RX ORDER — LEVOTHYROXINE SODIUM 25 UG/1
25 TABLET ORAL
Qty: 30 TABLET | Refills: 5 | Status: SHIPPED | OUTPATIENT
Start: 2024-11-04

## 2024-11-18 DIAGNOSIS — F41.9 ANXIETY: ICD-10-CM

## 2024-11-20 RX ORDER — ALPRAZOLAM 0.5 MG
0.5 TABLET ORAL DAILY PRN
Qty: 60 TABLET | Refills: 0 | Status: SHIPPED | OUTPATIENT
Start: 2024-11-20

## 2024-12-01 DIAGNOSIS — F41.9 ANXIETY: ICD-10-CM

## 2024-12-03 DIAGNOSIS — R12 HEARTBURN: ICD-10-CM

## 2024-12-03 DIAGNOSIS — F90.2 ADHD (ATTENTION DEFICIT HYPERACTIVITY DISORDER), COMBINED TYPE: ICD-10-CM

## 2024-12-03 RX ORDER — METHYLPHENIDATE HYDROCHLORIDE 5 MG/1
5 TABLET ORAL
Qty: 60 TABLET | Refills: 0 | Status: SHIPPED | OUTPATIENT
Start: 2024-12-03

## 2024-12-03 RX ORDER — ESCITALOPRAM OXALATE 20 MG/1
20 TABLET ORAL DAILY
Qty: 90 TABLET | Refills: 1 | Status: SHIPPED | OUTPATIENT
Start: 2024-12-03

## 2024-12-03 RX ORDER — PANTOPRAZOLE SODIUM 20 MG/1
20 TABLET, DELAYED RELEASE ORAL DAILY
Qty: 90 TABLET | Refills: 1 | Status: SHIPPED | OUTPATIENT
Start: 2024-12-03

## 2025-01-06 DIAGNOSIS — F41.9 ANXIETY: ICD-10-CM

## 2025-01-07 RX ORDER — ALPRAZOLAM 0.5 MG
0.5 TABLET ORAL DAILY PRN
Qty: 60 TABLET | Refills: 0 | Status: SHIPPED | OUTPATIENT
Start: 2025-01-07

## 2025-02-12 DIAGNOSIS — F41.9 ANXIETY: ICD-10-CM

## 2025-02-13 RX ORDER — ALPRAZOLAM 0.5 MG
0.5 TABLET ORAL DAILY PRN
Qty: 60 TABLET | Refills: 0 | Status: SHIPPED | OUTPATIENT
Start: 2025-02-13

## 2025-03-10 ENCOUNTER — TELEPHONE (OUTPATIENT)
Dept: FAMILY MEDICINE CLINIC | Facility: CLINIC | Age: 45
End: 2025-03-10

## 2025-03-10 NOTE — TELEPHONE ENCOUNTER
Please call and schedule appt with patient.     Beth Swanson MD to [facility]       3/10/25  2:43 PM  Should be seen. Please schedule appt

## 2025-03-14 DIAGNOSIS — F41.9 ANXIETY: ICD-10-CM

## 2025-03-14 RX ORDER — ALPRAZOLAM 0.5 MG
0.5 TABLET ORAL DAILY PRN
Qty: 60 TABLET | Refills: 0 | Status: SHIPPED | OUTPATIENT
Start: 2025-03-14

## 2025-04-04 ENCOUNTER — OFFICE VISIT (OUTPATIENT)
Dept: FAMILY MEDICINE CLINIC | Facility: CLINIC | Age: 45
End: 2025-04-04
Payer: COMMERCIAL

## 2025-04-04 VITALS
HEART RATE: 74 BPM | SYSTOLIC BLOOD PRESSURE: 116 MMHG | DIASTOLIC BLOOD PRESSURE: 78 MMHG | TEMPERATURE: 97.3 F | WEIGHT: 146.8 LBS | OXYGEN SATURATION: 99 % | BODY MASS INDEX: 24.46 KG/M2 | HEIGHT: 65 IN

## 2025-04-04 DIAGNOSIS — I47.10 SVT (SUPRAVENTRICULAR TACHYCARDIA) (HCC): Primary | ICD-10-CM

## 2025-04-04 DIAGNOSIS — F90.2 ADHD (ATTENTION DEFICIT HYPERACTIVITY DISORDER), COMBINED TYPE: ICD-10-CM

## 2025-04-04 DIAGNOSIS — L40.50 PSORIATIC ARTHRITIS (HCC): ICD-10-CM

## 2025-04-04 DIAGNOSIS — Z00.00 ANNUAL PHYSICAL EXAM: ICD-10-CM

## 2025-04-04 DIAGNOSIS — F51.04 PSYCHOPHYSIOLOGICAL INSOMNIA: ICD-10-CM

## 2025-04-04 PROBLEM — F31.81 BIPOLAR 2 DISORDER, MAJOR DEPRESSIVE EPISODE (HCC): Status: RESOLVED | Noted: 2023-07-07 | Resolved: 2025-04-04

## 2025-04-04 PROCEDURE — 99214 OFFICE O/P EST MOD 30 MIN: CPT | Performed by: FAMILY MEDICINE

## 2025-04-04 PROCEDURE — 99396 PREV VISIT EST AGE 40-64: CPT | Performed by: FAMILY MEDICINE

## 2025-04-04 RX ORDER — HYDROXYZINE HYDROCHLORIDE 25 MG/1
25 TABLET, FILM COATED ORAL
Qty: 30 TABLET | Refills: 1 | Status: SHIPPED | OUTPATIENT
Start: 2025-04-04

## 2025-04-04 RX ORDER — ATOMOXETINE 40 MG/1
40 CAPSULE ORAL DAILY
Qty: 30 CAPSULE | Refills: 1 | Status: SHIPPED | OUTPATIENT
Start: 2025-04-04

## 2025-04-04 NOTE — PROGRESS NOTES
"Adult Annual Physical  Name: Joselin Diamond      : 1980      MRN: [id number]  Encounter Provider: Beth Swanson MD  Encounter Date: 2025   Encounter department: [facility]    :  Assessment & Plan  SVT (supraventricular tachycardia) (HCC)  Had work up in the past and saw cardiology   Not on medications  No symptoms at this time.       Psoriatic arthritis (HCC)  She sees Rheumatology and not on medications at this time.       ADHD (attention deficit hyperactivity disorder), combined type  Will d/c Ritalin and start Strattera   Discussed common side effects.    Orders:  •  atoMOXetine (STRATTERA) 40 mg capsule; Take 1 capsule (40 mg total) by mouth daily    Psychophysiological insomnia  Hopefully will improve after discontinuing Ritalin.  Will start Hydroxyzine PRN  Orders:  •  hydrOXYzine HCL (ATARAX) 25 mg tablet; Take 1 tablet (25 mg total) by mouth daily at bedtime as needed for anxiety (insomnia)    Annual physical exam             Preventive Screenings:  - Diabetes Screening: screening up-to-date and risks/benefits discussed  - Cholesterol Screening: screening not indicated   - Hepatitis C screening: screening up-to-date   - HIV screening: screening not indicated   - Cervical cancer screening: screening up-to-date and risks/benefits discussed   - Breast cancer screening: screening up-to-date   - Colon cancer screening: screening up-to-date   - Lung cancer screening: screening not indicated     Immunizations:  - Immunizations due: Influenza    Counseling/Anticipatory Guidance:    - Diet: discussed recommendations for a healthy/well-balanced diet.   - Exercise: the importance of regular exercise/physical activity was discussed. Recommend exercise 3-5 times per week for at least 30 minutes.          History of Present Illness     Adult Annual Physical:  Patient presents for annual physical. She is having issues with her Ritalin. She feels that when it wears off she \"crashes\". " "She tried taking a 2nd dose in the afternoon but it interfered with sleep.  She is asking about Strattera.  She feels the Ritalin may also make her more anxious. Would like to try non-stimulant.  She is taking Xanax at night to help her sleep. Does not want to keep taking this.  She has tried Trazodone in the past and it didn't work. She is on Lexapro as well for anxiety..     Diet and Physical Activity:  - Diet/Nutrition: no special diet.  - Exercise: walking and 5-7 times a week on average.    Depression Screening:    - PHQ-9 Score: 9    General Health:  - Sleep: sleeps poorly and 4-6 hours of sleep on average.  - Hearing: normal hearing bilateral ears.  - Vision: no vision problems.  - Dental: regular dental visits, brushes teeth twice daily and floss regularly.    /GYN Health:  - Follows with GYN: yes.   - Menopause: premenopausal.     Review of Systems   Constitutional:  Positive for fatigue. Negative for activity change.   Respiratory:  Negative for chest tightness and shortness of breath.    Cardiovascular:  Negative for chest pain and leg swelling.   Musculoskeletal:  Positive for arthralgias.   Neurological:  Negative for headaches.   Psychiatric/Behavioral:  Positive for decreased concentration and sleep disturbance. Negative for dysphoric mood. The patient is nervous/anxious.          Objective   /78   Pulse 74   Temp (!) 97.3 °F (36.3 °C)   Ht 5' 5\" (1.651 m)   Wt 66.6 kg (146 lb 12.8 oz)   SpO2 99%   BMI 24.43 kg/m²     Physical Exam  Vitals and nursing note reviewed.   Constitutional:       General: She is not in acute distress.     Appearance: Normal appearance. She is well-developed. She is not ill-appearing, toxic-appearing or diaphoretic.   HENT:      Head: Normocephalic and atraumatic.      Right Ear: Tympanic membrane, ear canal and external ear normal.      Left Ear: Tympanic membrane, ear canal and external ear normal.      Mouth/Throat:      Mouth: Mucous membranes are moist.     "  Pharynx: Oropharynx is clear. No oropharyngeal exudate.   Eyes:      General: No scleral icterus.        Right eye: No discharge.         Left eye: No discharge.      Conjunctiva/sclera: Conjunctivae normal.      Pupils: Pupils are equal, round, and reactive to light.   Neck:      Thyroid: No thyromegaly.   Cardiovascular:      Rate and Rhythm: Normal rate and regular rhythm.      Heart sounds: Normal heart sounds. No murmur heard.     No friction rub. No gallop.   Pulmonary:      Effort: Pulmonary effort is normal. No respiratory distress.      Breath sounds: Normal breath sounds. No stridor. No wheezing or rales.   Chest:      Chest wall: No tenderness.   Abdominal:      General: There is no distension.      Palpations: Abdomen is soft.      Tenderness: There is no abdominal tenderness.   Musculoskeletal:      Right lower leg: No edema.      Left lower leg: No edema.   Lymphadenopathy:      Cervical: No cervical adenopathy.   Skin:     General: Skin is warm.      Findings: No rash.   Neurological:      General: No focal deficit present.      Mental Status: She is alert and oriented to person, place, and time. Mental status is at baseline.      Cranial Nerves: No cranial nerve deficit.   Psychiatric:         Mood and Affect: Mood normal.         Behavior: Behavior normal.         Thought Content: Thought content normal.         Judgment: Judgment normal.

## 2025-04-04 NOTE — ASSESSMENT & PLAN NOTE
Will d/c Ritalin and start Strattera   Discussed common side effects.    Orders:  •  atoMOXetine (STRATTERA) 40 mg capsule; Take 1 capsule (40 mg total) by mouth daily

## 2025-04-09 ENCOUNTER — OFFICE VISIT (OUTPATIENT)
Dept: FAMILY MEDICINE CLINIC | Facility: CLINIC | Age: 45
End: 2025-04-09
Payer: COMMERCIAL

## 2025-04-09 VITALS
HEIGHT: 65 IN | OXYGEN SATURATION: 99 % | HEART RATE: 74 BPM | SYSTOLIC BLOOD PRESSURE: 118 MMHG | TEMPERATURE: 97.4 F | BODY MASS INDEX: 24.49 KG/M2 | WEIGHT: 147 LBS | DIASTOLIC BLOOD PRESSURE: 68 MMHG

## 2025-04-09 DIAGNOSIS — H60.503 ACUTE OTITIS EXTERNA OF BOTH EARS, UNSPECIFIED TYPE: Primary | ICD-10-CM

## 2025-04-09 PROCEDURE — 99213 OFFICE O/P EST LOW 20 MIN: CPT

## 2025-04-09 RX ORDER — NEOMYCIN SULFATE, POLYMYXIN B SULFATE, HYDROCORTISONE 3.5; 10000; 1 MG/ML; [USP'U]/ML; MG/ML
4 SOLUTION/ DROPS AURICULAR (OTIC) EVERY 6 HOURS SCHEDULED
Qty: 10 ML | Refills: 0 | Status: SHIPPED | OUTPATIENT
Start: 2025-04-09 | End: 2025-04-16

## 2025-04-09 NOTE — LETTER
April 9, 2025     Patient: Joselin Diamond  YOB: 1980  Date of Visit: 4/9/2025      To Whom it May Concern:    Joselin Diamond is under my professional care. Joselin was seen in my office on 4/9/2025. Joselin may return to work on 4/10/25 .    If you have any questions or concerns, please don't hesitate to call.         Sincerely,          Jess Daugherty PA-C        CC: No Recipients

## 2025-04-09 NOTE — PROGRESS NOTES
"Name: Joselin Diamond      : 1980      MRN: [id number]  Encounter Provider: Jess Daugherty PA-C  Encounter Date: 2025   Encounter department: [facility]  :  Assessment & Plan  Acute otitis externa of both ears, unspecified type  Patient presents for evaluation of b/l ear pain x 3 days  On exam, mild swelling and erythema to inner ear canals -- otherwise unremarkable. Tympanic membranes appear normal.   Discussed with exam findings and history of swimming over the weekend, concerned for possible otitis externa. Therefore, will cover with cortisporin x 7 days. Also discussed possible ETD/allergy component and recommended to start OTC antihistamine (zyrtec/Claritin) and Flonase.   Otherwise continue supportive care.   To call if symptoms persist/worsen, if so would recommend to see ENT for further evaluation.     Orders:    neomycin-polymyxin-hydrocortisone (CORTISPORIN) otic solution; Administer 4 drops into both ears every 6 (six) hours for 7 days           History of Present Illness   CC: ear pain x 3 days    Patient presents for evaluation of ear pain x 3 days. Reports started with the right ear, but now the left ear is bothering her as well. Reports feels \"pressure in the ears\". No hearing changes or discharge from the air.  Mild sore throat but otherwise no URI symptoms  Denies fevers/chills, chest pain/sob  Swimming over the weekend and has history of swimmer's ear       Review of Systems   HENT:  Positive for ear pain. Negative for congestion, ear discharge and hearing loss.    Respiratory:  Negative for cough, chest tightness, shortness of breath and wheezing.    Cardiovascular:  Negative for chest pain and palpitations.   Neurological:  Negative for dizziness, light-headedness and headaches.       Objective   /68   Pulse 74   Temp (!) 97.4 °F (36.3 °C)   Ht 5' 5\" (1.651 m)   Wt 66.7 kg (147 lb)   SpO2 99%   BMI 24.46 kg/m²      Physical Exam  Vitals reviewed. "   Constitutional:       General: She is not in acute distress.     Appearance: Normal appearance. She is not ill-appearing or diaphoretic.   HENT:      Head: Normocephalic and atraumatic.      Right Ear: Tympanic membrane and external ear normal. Swelling (mild inner ear canal erythema/swelling) present. No drainage or tenderness. Tympanic membrane is not perforated, erythematous, retracted or bulging.      Left Ear: Tympanic membrane and external ear normal. Swelling (mild inner ear canal erythema/swelling) present. No drainage or tenderness. Tympanic membrane is not perforated, erythematous, retracted or bulging.      Nose: Nose normal. No congestion or rhinorrhea.      Mouth/Throat:      Mouth: Mucous membranes are moist.      Pharynx: Oropharynx is clear. No oropharyngeal exudate or posterior oropharyngeal erythema.   Eyes:      General:         Right eye: No discharge.         Left eye: No discharge.      Conjunctiva/sclera: Conjunctivae normal.   Cardiovascular:      Rate and Rhythm: Normal rate and regular rhythm.      Heart sounds: Normal heart sounds. No murmur heard.  Pulmonary:      Effort: Pulmonary effort is normal. No respiratory distress.      Breath sounds: Normal breath sounds. No wheezing, rhonchi or rales.   Skin:     General: Skin is warm.   Neurological:      General: No focal deficit present.      Mental Status: She is alert.      Gait: Gait normal.   Psychiatric:         Mood and Affect: Mood normal.

## 2025-04-26 DIAGNOSIS — Z00.00 ANNUAL PHYSICAL EXAM: ICD-10-CM

## 2025-04-26 DIAGNOSIS — Z20.9 EXPOSURE TO POTENTIAL INFECTION: ICD-10-CM

## 2025-04-26 DIAGNOSIS — F32.0 CURRENT MILD EPISODE OF MAJOR DEPRESSIVE DISORDER, UNSPECIFIED WHETHER RECURRENT (HCC): ICD-10-CM

## 2025-04-26 DIAGNOSIS — E66.813 CLASS 3 SEVERE OBESITY DUE TO EXCESS CALORIES WITH SERIOUS COMORBIDITY IN ADULT, UNSPECIFIED BMI: ICD-10-CM

## 2025-04-28 RX ORDER — LEVOTHYROXINE SODIUM 25 UG/1
25 TABLET ORAL EVERY MORNING
Qty: 30 TABLET | Refills: 5 | Status: SHIPPED | OUTPATIENT
Start: 2025-04-28

## 2025-05-04 DIAGNOSIS — F41.9 ANXIETY: ICD-10-CM

## 2025-05-05 RX ORDER — ALPRAZOLAM 0.5 MG
0.5 TABLET ORAL DAILY PRN
Qty: 60 TABLET | Refills: 0 | Status: SHIPPED | OUTPATIENT
Start: 2025-05-05

## 2025-05-15 ENCOUNTER — OFFICE VISIT (OUTPATIENT)
Dept: FAMILY MEDICINE CLINIC | Facility: CLINIC | Age: 45
End: 2025-05-15
Payer: COMMERCIAL

## 2025-05-15 VITALS
HEART RATE: 68 BPM | OXYGEN SATURATION: 99 % | SYSTOLIC BLOOD PRESSURE: 114 MMHG | WEIGHT: 149.8 LBS | HEIGHT: 65 IN | BODY MASS INDEX: 24.96 KG/M2 | DIASTOLIC BLOOD PRESSURE: 72 MMHG | TEMPERATURE: 98.2 F

## 2025-05-15 DIAGNOSIS — F51.04 PSYCHOPHYSIOLOGICAL INSOMNIA: ICD-10-CM

## 2025-05-15 DIAGNOSIS — F90.2 ADHD (ATTENTION DEFICIT HYPERACTIVITY DISORDER), COMBINED TYPE: Primary | ICD-10-CM

## 2025-05-15 PROCEDURE — 99214 OFFICE O/P EST MOD 30 MIN: CPT | Performed by: FAMILY MEDICINE

## 2025-05-15 RX ORDER — ATOMOXETINE 40 MG/1
40 CAPSULE ORAL DAILY
Qty: 90 CAPSULE | Refills: 1 | Status: SHIPPED | OUTPATIENT
Start: 2025-05-15

## 2025-05-15 RX ORDER — HYDROXYZINE HYDROCHLORIDE 25 MG/1
50 TABLET, FILM COATED ORAL
Qty: 180 TABLET | Refills: 1 | Status: SHIPPED | OUTPATIENT
Start: 2025-05-15

## 2025-05-15 NOTE — PROGRESS NOTES
"Name: Joselin Diamond      : 1980      MRN: [id number]  Encounter Provider: Beth Swanson MD  Encounter Date: 5/15/2025   Encounter department: [facility]  :  Assessment & Plan  ADHD (attention deficit hyperactivity disorder), combined type  Strattera is working. No adverse reactions. Advised to follow-up in 6 months sooner if needed.  Orders:  •  atoMOXetine (STRATTERA) 40 mg capsule; Take 1 capsule (40 mg total) by mouth daily    Psychophysiological insomnia  Hydroxyzine is working fairly well, but sometimes not effective. Discussed she can try taking 50 mg dose if the 25 mg is not helping her fall asleep.  Will renew 25 mg - can take 1-2 at bedtime.   Orders:  •  hydrOXYzine HCL (ATARAX) 25 mg tablet; Take 2 tablets (50 mg total) by mouth daily at bedtime as needed for anxiety (insomnia)      Assessment & Plan           History of Present Illness   44 year old here for a check up. Was changed from Ritalin to Strattera and this seems to be working well. She feels it lasts longer throughout the day. She is tolerating this well. She also is now taking Hydroxyzine PRN for insomnia.  This works for her most nights, sometimes still has trouble falling asleep.       Review of Systems   Psychiatric/Behavioral:  Positive for sleep disturbance. Negative for decreased concentration.        Objective   /72   Pulse 68   Temp 98.2 °F (36.8 °C)   Ht 5' 5\" (1.651 m)   Wt 67.9 kg (149 lb 12.8 oz)   SpO2 99%   BMI 24.93 kg/m²      Physical Exam  Vitals and nursing note reviewed.   Constitutional:       General: She is not in acute distress.     Appearance: She is well-developed. She is not ill-appearing, toxic-appearing or diaphoretic.   HENT:      Head: Normocephalic and atraumatic.     Cardiovascular:      Rate and Rhythm: Normal rate and regular rhythm.      Heart sounds: Normal heart sounds.   Pulmonary:      Effort: Pulmonary effort is normal. No respiratory distress.      " Breath sounds: Normal breath sounds.     Neurological:      General: No focal deficit present.      Mental Status: She is alert.     Psychiatric:         Mood and Affect: Mood normal.         Behavior: Behavior normal.         Thought Content: Thought content normal.         Judgment: Judgment normal.

## 2025-05-15 NOTE — ASSESSMENT & PLAN NOTE
Strattera is working. No adverse reactions. Advised to follow-up in 6 months sooner if needed.  Orders:  •  atoMOXetine (STRATTERA) 40 mg capsule; Take 1 capsule (40 mg total) by mouth daily

## 2025-05-18 DIAGNOSIS — F41.9 ANXIETY: ICD-10-CM

## 2025-05-18 RX ORDER — ESCITALOPRAM OXALATE 20 MG/1
20 TABLET ORAL DAILY
Qty: 90 TABLET | Refills: 1 | Status: SHIPPED | OUTPATIENT
Start: 2025-05-18

## 2025-06-09 DIAGNOSIS — F41.9 ANXIETY: ICD-10-CM

## 2025-06-10 RX ORDER — ALPRAZOLAM 0.5 MG
0.5 TABLET ORAL DAILY PRN
Qty: 60 TABLET | Refills: 0 | Status: SHIPPED | OUTPATIENT
Start: 2025-06-10

## 2025-07-14 ENCOUNTER — OFFICE VISIT (OUTPATIENT)
Dept: FAMILY MEDICINE CLINIC | Facility: CLINIC | Age: 45
End: 2025-07-14
Payer: COMMERCIAL

## 2025-07-14 VITALS
DIASTOLIC BLOOD PRESSURE: 68 MMHG | SYSTOLIC BLOOD PRESSURE: 106 MMHG | HEIGHT: 65 IN | BODY MASS INDEX: 23.82 KG/M2 | WEIGHT: 143 LBS | HEART RATE: 66 BPM | TEMPERATURE: 98.9 F | OXYGEN SATURATION: 98 %

## 2025-07-14 DIAGNOSIS — Z12.31 ENCOUNTER FOR SCREENING MAMMOGRAM FOR BREAST CANCER: ICD-10-CM

## 2025-07-14 DIAGNOSIS — F41.9 ANXIETY: ICD-10-CM

## 2025-07-14 DIAGNOSIS — F90.2 ADHD (ATTENTION DEFICIT HYPERACTIVITY DISORDER), COMBINED TYPE: Primary | ICD-10-CM

## 2025-07-14 PROCEDURE — 99213 OFFICE O/P EST LOW 20 MIN: CPT | Performed by: FAMILY MEDICINE

## 2025-07-14 RX ORDER — ATOMOXETINE 18 MG/1
18 CAPSULE ORAL DAILY
Qty: 30 CAPSULE | Refills: 1 | Status: SHIPPED | OUTPATIENT
Start: 2025-07-14

## 2025-07-14 NOTE — PROGRESS NOTES
"Name: Joselin Diamond      : 1980      MRN: [id number]  Encounter Provider: Beth Swanson MD  Encounter Date: 2025   Encounter department: [facility]  :  Assessment & Plan  ADHD (attention deficit hyperactivity disorder), combined type  Will try lower dose of Strattera 18 mg (was on 40)  recheck in 1 month.  Orders:  •  atoMOXetine (STRATTERA) 18 mg capsule; Take 1 capsule (18 mg total) by mouth daily    Encounter for screening mammogram for breast cancer    Orders:  •  Mammo screening bilateral w 3d and cad; Future    Anxiety  On Lexapro and will continue this         Assessment & Plan           History of Present Illness   She is on Strattera, she feels the dose is too high. Makes her feel anxious. She had a panic attack the other day.   Previously she was on Ritalin, she felt like she \"crashed\" when this wore off.   Takes Lexapro for anxiety.      Review of Systems   Psychiatric/Behavioral:  Positive for decreased concentration. The patient is nervous/anxious.        Objective   /68   Pulse 66   Temp 98.9 °F (37.2 °C)   Ht 5' 5\" (1.651 m)   Wt 64.9 kg (143 lb)   SpO2 98%   BMI 23.80 kg/m²      Physical Exam  Vitals and nursing note reviewed.   Constitutional:       General: She is not in acute distress.     Appearance: She is well-developed. She is not ill-appearing, toxic-appearing or diaphoretic.   HENT:      Head: Normocephalic and atraumatic.     Cardiovascular:      Rate and Rhythm: Normal rate and regular rhythm.   Pulmonary:      Effort: Pulmonary effort is normal. No respiratory distress.      Breath sounds: Normal breath sounds.     Neurological:      Mental Status: She is alert.     Psychiatric:         Behavior: Behavior normal.         Thought Content: Thought content normal.         Judgment: Judgment normal.         "

## 2025-07-14 NOTE — ASSESSMENT & PLAN NOTE
Will try lower dose of Strattera 18 mg (was on 40)  recheck in 1 month.  Orders:  •  atoMOXetine (STRATTERA) 18 mg capsule; Take 1 capsule (18 mg total) by mouth daily

## 2025-07-30 DIAGNOSIS — F41.9 ANXIETY: ICD-10-CM

## 2025-07-30 RX ORDER — ALPRAZOLAM 0.5 MG
0.5 TABLET ORAL DAILY PRN
Qty: 60 TABLET | Refills: 0 | Status: SHIPPED | OUTPATIENT
Start: 2025-07-30

## 2025-08-02 LAB
1OH-MIDAZOLAM UR-MCNC: NEGATIVE NG/ML
25(OH)D3+25(OH)D2 SERPL-MCNC: 47 NG/ML (ref 30–100)
7AMINOCLONAZEPAM UR-MCNC: NEGATIVE NG/ML
A-OH ALPRAZ UR-MCNC: 418 NG/ML
ACCEPTIBLE SP GR UR QL: 1.02 (ref 1–1.03)
ADULTERANTS PANEL - URINE: ABNORMAL
ALBUMIN SERPL-MCNC: 4.4 G/DL (ref 3.5–5.7)
ALP SERPL-CCNC: 65 U/L (ref 35–120)
ALT SERPL-CCNC: 10 U/L
AMPHETAMINES UR QL SCN>500 NG/ML: NEGATIVE
ANION GAP SERPL CALCULATED.3IONS-SCNC: 8 MMOL/L (ref 3–11)
AST SERPL-CCNC: 14 U/L
BARBITURATES UR QL SCN: NEGATIVE
BASOPHILS # BLD AUTO: 0 THOU/CMM (ref 0–0.1)
BASOPHILS NFR BLD AUTO: 1 %
BENZODIAZ UR QL SCN: POSITIVE
BILIRUB SERPL-MCNC: 1.3 MG/DL (ref 0.2–1)
BUN SERPL-MCNC: 10 MG/DL (ref 7–25)
CALCIUM SERPL-MCNC: 9.3 MG/DL (ref 8.5–10.5)
CANNABINOIDS UR QL SCN: NEGATIVE
CHLORIDE SERPL-SCNC: 103 MMOL/L (ref 100–109)
CHOLEST SERPL-MCNC: 137 MG/DL
CHOLEST/HDLC SERPL: 3.3 {RATIO}
CO2 SERPL-SCNC: 29 MMOL/L (ref 21–31)
COCAINE UR QL SCN: NEGATIVE
CREAT SERPL-MCNC: 0.6 MG/DL (ref 0.4–1.1)
CREAT UR-MCNC: 128 MG/DL
CYTOLOGY CMNT CVX/VAG CYTO-IMP: ABNORMAL
DIAZEPAM UR-MCNC: NEGATIVE NG/ML
DIFFERENTIAL METHOD BLD: NORMAL
EOSINOPHIL # BLD AUTO: 0.1 THOU/CMM (ref 0–0.5)
EOSINOPHIL NFR BLD AUTO: 1 %
ERYTHROCYTE [DISTWIDTH] IN BLOOD BY AUTOMATED COUNT: 12.5 % (ref 12–16)
FOLATE SERPL-MCNC: 21.7 NG/ML
GFR/BSA.PRED SERPLBLD CYS-BASED-ARV: 113 ML/MIN/{1.73_M2}
GLUCOSE SERPL-MCNC: 83 MG/DL (ref 65–99)
HCT VFR BLD AUTO: 42.3 % (ref 35–43)
HDLC SERPL-MCNC: 42 MG/DL (ref 23–92)
HGB BLD-MCNC: 14.4 G/DL (ref 11.5–14.5)
LDLC SERPL CALC-MCNC: 79 MG/DL
LIPASE SERPL-CCNC: 24 U/L (ref 11–82)
LORAZEPAM UR-MCNC: NEGATIVE NG/ML
LYMPHOCYTES # BLD AUTO: 1.9 THOU/CMM (ref 1–3)
LYMPHOCYTES NFR BLD AUTO: 33 %
MCH RBC QN AUTO: 31.8 PG (ref 26–34)
MCHC RBC AUTO-ENTMCNC: 34.1 G/DL (ref 32–37)
MCV RBC AUTO: 93 FL (ref 80–100)
METHADONE UR QL SCN: NEGATIVE
MONOCYTES # BLD AUTO: 0.3 THOU/CMM (ref 0.3–1)
MONOCYTES NFR BLD AUTO: 5 %
NEUTROPHILS # BLD AUTO: 3.5 THOU/CMM (ref 1.8–7.8)
NEUTROPHILS NFR BLD AUTO: 60 %
NITRITE UR QL: <200 MCG/ML
NONHDLC SERPL-MCNC: 95 MG/DL
NORDIAZEPAM UR-MCNC: NEGATIVE NG/ML
OPIATES UR QL SCN: NEGATIVE
OXAZEPAM UR-MCNC: NEGATIVE NG/ML
OXYCODONE+OXYMORPHONE UR QL SCN: NEGATIVE
PCP UR QL SCN>25 NG/ML: NEGATIVE
PH UR: 8.3 [PH] (ref 4.5–8)
PLATELET # BLD AUTO: 183 THOU/CMM (ref 140–350)
PMV BLD REES-ECKER: 9 FL (ref 7.5–11.3)
POTASSIUM SERPL-SCNC: 4.2 MMOL/L (ref 3.5–5.2)
PROLACTIN SERPL-MCNC: 10.95 NG/ML
PROT SERPL-MCNC: 6.8 G/DL (ref 6.3–8.3)
RBC # BLD AUTO: 4.53 MILL/CMM (ref 3.7–4.7)
SODIUM SERPL-SCNC: 140 MMOL/L (ref 135–145)
TEMAZEPAM UR-MCNC: NEGATIVE NG/ML
TRIGL SERPL-MCNC: 80 MG/DL
TSH SERPL-ACNC: 2.85 UIU/ML (ref 0.45–5.33)
VIT B12 SERPL-MCNC: 396 PG/ML (ref 180–914)
WBC # BLD AUTO: 5.8 THOU/CMM (ref 4–10)

## 2025-08-06 DIAGNOSIS — F41.9 ANXIETY: ICD-10-CM

## 2025-08-08 RX ORDER — ALPRAZOLAM 0.5 MG
0.5 TABLET ORAL DAILY PRN
Qty: 60 TABLET | Refills: 0 | OUTPATIENT
Start: 2025-08-08

## (undated) DEVICE — SPONGE TONSIL STRUNG 7/8 IN X-RAY DETECT

## (undated) DEVICE — DRAPE SHEET THREE QUARTER

## (undated) DEVICE — SYRINGE BULB 2 OZ

## (undated) DEVICE — SUCTION COAGULATOR 12FR HAND CONTROL MEGADYNE

## (undated) DEVICE — CYLINDRICAL SPONGES: Brand: DEROYAL

## (undated) DEVICE — STRAIGHT CATH RED RUBBER 12FR

## (undated) DEVICE — ELECTRODE BLADE MOD E-Z CLEAN 2.5IN 6.4CM -0012M

## (undated) DEVICE — STERILE BETHLEHEM T AND A PACK: Brand: CARDINAL HEALTH

## (undated) DEVICE — ANTI-FOG SOLUTION WITH FOAM PAD: Brand: DEVON

## (undated) DEVICE — MEDI-VAC YANK SUCT HNDL W/TPRD BULBOUS TIP: Brand: CARDINAL HEALTH

## (undated) DEVICE — PENCIL ELECTROSURG E-Z CLEAN -0035H

## (undated) DEVICE — GLOVE SRG BIOGEL 8

## (undated) DEVICE — UTILITY MARKER,BLACK WITH LABELS: Brand: DEVON

## (undated) DEVICE — SURGICEL FIBRILLAR 1 X 2

## (undated) DEVICE — SPECIMEN CONTAINER STERILE PEEL PACK

## (undated) DEVICE — TUBING SUCTION 5MM X 12 FT